# Patient Record
Sex: MALE | Race: BLACK OR AFRICAN AMERICAN | NOT HISPANIC OR LATINO | Employment: OTHER | URBAN - METROPOLITAN AREA
[De-identification: names, ages, dates, MRNs, and addresses within clinical notes are randomized per-mention and may not be internally consistent; named-entity substitution may affect disease eponyms.]

---

## 2018-09-28 ENCOUNTER — TELEPHONE (OUTPATIENT)
Dept: NEUROSURGERY | Facility: CLINIC | Age: 58
End: 2018-09-28

## 2018-10-01 ENCOUNTER — INITIAL CONSULT (OUTPATIENT)
Dept: NEUROSURGERY | Facility: CLINIC | Age: 58
End: 2018-10-01
Payer: MEDICARE

## 2018-10-01 ENCOUNTER — HOSPITAL ENCOUNTER (OUTPATIENT)
Dept: RADIOLOGY | Facility: HOSPITAL | Age: 58
Discharge: HOME | End: 2018-10-01
Attending: NEUROLOGICAL SURGERY
Payer: MEDICARE

## 2018-10-01 VITALS
SYSTOLIC BLOOD PRESSURE: 125 MMHG | WEIGHT: 225.31 LBS | HEART RATE: 94 BPM | DIASTOLIC BLOOD PRESSURE: 85 MMHG | BODY MASS INDEX: 28.16 KG/M2

## 2018-10-01 DIAGNOSIS — S32.009K LUMBAR PSEUDOARTHROSIS: ICD-10-CM

## 2018-10-01 DIAGNOSIS — M47.26 OTHER SPONDYLOSIS WITH RADICULOPATHY, LUMBAR REGION: ICD-10-CM

## 2018-10-01 DIAGNOSIS — Z98.1 S/P LUMBAR FUSION: ICD-10-CM

## 2018-10-01 DIAGNOSIS — M47.12 CERVICAL SPONDYLOSIS WITH MYELOPATHY: Primary | ICD-10-CM

## 2018-10-01 PROCEDURE — 99213 OFFICE O/P EST LOW 20 MIN: CPT | Mod: PBBFAC,PN | Performed by: NEUROLOGICAL SURGERY

## 2018-10-01 PROCEDURE — 99999 PR PBB SHADOW E&M-EST. PATIENT-LVL III: CPT | Mod: PBBFAC,,, | Performed by: NEUROLOGICAL SURGERY

## 2018-10-01 PROCEDURE — 99204 OFFICE O/P NEW MOD 45 MIN: CPT | Mod: S$PBB,,, | Performed by: NEUROLOGICAL SURGERY

## 2018-10-01 PROCEDURE — 72082 X-RAY EXAM ENTIRE SPI 2/3 VW: CPT | Mod: TC,FY

## 2018-10-01 PROCEDURE — 72082 X-RAY EXAM ENTIRE SPI 2/3 VW: CPT | Mod: 26,,, | Performed by: RADIOLOGY

## 2018-10-01 RX ORDER — OLMESARTAN MEDOXOMIL AND HYDROCHLOROTHIAZIDE 20/12.5 20; 12.5 MG/1; MG/1
1 TABLET ORAL DAILY
COMMUNITY
End: 2021-02-01 | Stop reason: ALTCHOICE

## 2018-10-01 NOTE — PROGRESS NOTES
NEUROSURGICAL OUTPATIENT CONSULTATION NOTE    DATE OF SERVICE:  10/01/2018    ATTENDING PHYSICIAN:  Ezequiel Pedroza MD    CONSULT REQUESTED BY:  Self referred    REASON FOR CONSULT:  Chronic low back pain, hands weakness    SUBJECTIVE:    HISTORY OF PRESENT ILLNESS:  This is a very pleasant 58 y.o. male who had right hip fusion in the 70's,  a MIS laminectomy in [place] in 2005 and a L3 to S1 posterolateral fusion with instrumentation at [facility] in 2015. Does not smoke tobacco but smoke THC for pain control. He has narcotic dependence since his back fusion surgery. He reports worsening low back pain since his lumbar fusion. Pain is felt from L3 to S1. No leg pain but reports bilateral S1 distribution paresthesia and numbness when he stands up. Also complains of bilateral C8 distribution numbness at night, hands weakness, difficulty opening jars. Not dropping things. Gait imbalance at baseline due to the hip fusion. Is actively doing PT and wears a LSO back brace for comfort.               PAST MEDICAL HISTORY:  Active Ambulatory Problems     Diagnosis Date Noted    Bursitis of hip 05/30/2014    DDD (degenerative disc disease), lumbosacral 05/30/2014    Lumbar spondylosis 05/30/2014    Lumbar radiculopathy 05/30/2014    History of back surgery 05/30/2014    Lumbar spinal stenosis 05/30/2014    Hx of surgical fusion joint 05/30/2014    Thoracic or lumbosacral neuritis or radiculitis, unspecified 06/20/2014    Positive cardiac stress test     Chest pain 08/23/2016    Dyspnea 08/23/2016     Resolved Ambulatory Problems     Diagnosis Date Noted    No Resolved Ambulatory Problems     Past Medical History:   Diagnosis Date    Hepatitis C     Positive cardiac stress test     Positive D dimer        PAST SURGICAL HISTORY:  Past Surgical History:   Procedure Laterality Date    HIP SURGERY Right     hip fusion    INJECTION-STEROID-EPIDURAL-LUMBAR N/A 6/20/2014    Per[facility]ely [facility] at [facility] OR  "   LAMINECTOMY      Lumbar    right wrist surgery         SOCIAL HISTORY:   Social History     Socioeconomic History    Marital status:      Spouse name: Not on file    Number of children: Not on file    Years of education: Not on file    Highest education level: Not on file   Social Needs    Financial resource strain: Not on file    Food insecurity - worry: Not on file    Food insecurity - inability: Not on file    Transportation needs - medical: Not on file    Transportation needs - non-medical: Not on file   Occupational History    Not on file   Tobacco Use    Smoking status: Never Smoker   Substance and Sexual Activity    Alcohol use: No    Drug use: Not on file    Sexual activity: Not on file   Other Topics Concern    Not on file   Social History Narrative    Not on file       FAMILY HISTORY:  Family History   Problem Relation Age of Onset    Diabetes Sister     Hypertension Sister     Diabetes Brother     Hypertension Brother        CURRENTS MEDICATIONS:  Current Outpatient Medications on File Prior to Visit   Medication Sig Dispense Refill    olmesartan-hydrochlorothiazide (BENICAR HCT) 20-12.5 mg per tablet Take 1 tablet by mouth once daily.      oxycodone-acetaminophen (PERCOCET)  mg per tablet Take 1 tablet by mouth every 4 (four) hours as needed for Pain.       No current facility-administered medications on file prior to visit.        ALLERGIES:  Review of patient's allergies indicates:   Allergen Reactions    Gabapentin Itching     C/O med causing bump like rash on lower trunk    Penicillins Rash     Pt states a "a couple of years ago" he took penicillin and broke out in "red spots" with no itching or difficulty breathing       REVIEW OF SYSTEMS:  Review of Systems   Constitutional: Negative for diaphoresis, fever and weight loss.   Respiratory: Negative for shortness of breath.    Cardiovascular: Negative for chest pain.   Gastrointestinal: Negative for blood in " stool.   Genitourinary: Negative for hematuria.   Endo/Heme/Allergies: Does not bruise/bleed easily.   All other systems reviewed and are negative.      OBJECTIVE:    PHYSICAL EXAMINATION:   Vitals:    10/01/18 1308   BP: 125/85   Pulse: 94       Physical Exam:  Vitals reviewed.    Constitutional: He appears well-developed and well-nourished.     Eyes: Pupils are equal, round, and reactive to light. Conjunctivae and EOM are normal.     Cardiovascular: Normal distal pulses and no edema.     Abdominal: Soft.     Skin: Skin displays no rash on trunk and no rash on extremities. Skin displays no lesions on trunk and no lesions on extremities.     Psych/Behavior: He is alert. He is oriented to person, place, and time. He has a normal mood and affect.     Musculoskeletal:        Neck: Range of motion is limited.     Neurological:        DTRs: Tricep reflexes are 3+ on the right side and 2+ on the left side. Bicep reflexes are 3+ on the right side and 2+ on the left side. Brachioradialis reflexes are 3+ on the right side and 2+ on the left side. Patellar reflexes are 1+ on the left side. Achilles reflexes are 0 on the right side and 0 on the left side.       Back Exam     Tenderness   The patient is experiencing tenderness in the lumbar.    Range of Motion   Extension: abnormal   Flexion: abnormal   Lateral bend right: abnormal   Lateral bend left: abnormal   Rotation right: abnormal   Rotation left: abnormal     Muscle Strength   Right Quadriceps:  5/5   Left Quadriceps:  5/5   Right Hamstrings:  5/5   Left Hamstrings:  5/5     Tests   Straight leg raise left: negative    Other   Toe walk: normal  Heel walk: normal            SI joint:   Palpation at the right and left SI joints not painful  RYLAN test is negative bilaterally  Gaenslen test is negative bilaterally  Thigh thrust test is negative bilaterally    Neurologic Exam     Mental Status   Oriented to person, place, and time.   Speech: speech is normal   Level of  consciousness: alert    Cranial Nerves   Cranial nerves II through XII intact.     CN III, IV, VI   Pupils are equal, round, and reactive to light.  Extraocular motions are normal.     Motor Exam   Muscle bulk: normal  Overall muscle tone: normal    Strength   Right deltoid: 5/5  Left deltoid: 5/5  Right biceps: 5/5  Left biceps: 5/5  Right triceps: 5/5  Left triceps: 5/5  Right wrist flexion: 5/5  Left wrist flexion: 5/5  Right wrist extension: 5/5  Left wrist extension: 5/5  Right interossei: 4/5  Left interossei: 4/5  Left iliopsoas: 5/5  Right quadriceps: 5/5  Left quadriceps: 5/5  Right hamstrin/5  Left hamstrin/5  Right anterior tibial: 5/5  Left anterior tibial: 5/5  Right posterior tibial: 5/5  Left posterior tibial: 5/5  Right peroneal: 5/5  Left peroneal: 5/5  Right gastroc: 5/5  Left gastroc: 5/5    Sensory Exam   Light touch normal.   Pinprick normal.     Gait, Coordination, and Reflexes     Gait  Gait: non-neurologic    Coordination   Finger to nose coordination: normal  Tandem walking coordination: normal    Reflexes   Right brachioradialis: 3+  Left brachioradialis: 2+  Right biceps: 3+  Left biceps: 2+  Right triceps: 3+  Left triceps: 2+  Left patellar: 1+  Right achilles: 0  Left achilles: 0  Right plantar: normal  Left plantar: normal  Right Schulz: absent  Left Schulz: absent  Right ankle clonus: absent  Left ankle clonus: absent        DIAGNOSTIC DATA:  I personally interpreted the following imaging:   Lumbar XR 2018: L3 to S1 fusion posterolateral, no interbody fusion, no loosening of hardware    ASSESMENT:  This is a 58 y.o. male with     Problem List Items Addressed This Visit     None      Visit Diagnoses     Cervical spondylosis with myelopathy    -  Primary    Relevant Orders    MRI Cervical Spine Without Contrast    S/P lumbar fusion        Relevant Orders    X-Ray Scoliosis Complete Including Supine And Erect    CT Lumbar Spine Without Contrast    MRI Lumbar Spine Without  Contrast    Lumbar pseudoarthrosis        Relevant Orders    X-Ray Scoliosis Complete Including Supine And Erect    CT Lumbar Spine Without Contrast    MRI Lumbar Spine Without Contrast    Other spondylosis with radiculopathy, lumbar region        Relevant Orders    X-Ray Scoliosis Complete Including Supine And Erect    MRI Lumbar Spine Without Contrast          PLAN:  FU after the MRI          Ezequiel Pedroza MD  Cell:[phone]

## 2018-10-10 ENCOUNTER — HOSPITAL ENCOUNTER (OUTPATIENT)
Dept: RADIOLOGY | Facility: HOSPITAL | Age: 58
Discharge: HOME | End: 2018-10-10
Attending: NEUROLOGICAL SURGERY
Payer: MEDICARE

## 2018-10-10 DIAGNOSIS — S32.009K LUMBAR PSEUDOARTHROSIS: ICD-10-CM

## 2018-10-10 DIAGNOSIS — Z98.1 S/P LUMBAR FUSION: ICD-10-CM

## 2018-10-10 PROCEDURE — 72131 CT LUMBAR SPINE W/O DYE: CPT | Mod: 26,,, | Performed by: RADIOLOGY

## 2018-10-10 PROCEDURE — 72131 CT LUMBAR SPINE W/O DYE: CPT | Mod: TC

## 2018-10-16 ENCOUNTER — TELEPHONE (OUTPATIENT)
Dept: NEUROSURGERY | Facility: CLINIC | Age: 58
End: 2018-10-16

## 2018-10-16 NOTE — TELEPHONE ENCOUNTER
Spoke to the patient he states the MRI he had scheduled today he was unable to do because it was not open. Instructed he can go to an open MRI on [address]. He is coming in tomorrow to  orders.

## 2018-10-16 NOTE — TELEPHONE ENCOUNTER
----- Message from Dolores Rodriguez sent at 10/16/2018 10:16 AM CDT -----  Contact: [phone]/self  Patient is requesting a callback about his MRI. Please advise.

## 2018-10-17 ENCOUNTER — TELEPHONE (OUTPATIENT)
Dept: NEUROSURGERY | Facility: CLINIC | Age: 58
End: 2018-10-17

## 2018-10-17 NOTE — TELEPHONE ENCOUNTER
Spoke to the patient, instructed that the only open MRI is on [address] and if it does not take his insurance I will send a message to Dr. Pedroza to see what he would like him to do.

## 2018-10-17 NOTE — TELEPHONE ENCOUNTER
----- Message from Quincy Vasquez sent at 10/17/2018 10:38 AM CDT -----  Contact: [phone]/self  Patient requesting to speak with you concerning [facility] not accepting his insurance.  He would like to speak with you concerning finding another place where he can have the [facility].  Please call and advise

## 2018-10-18 DIAGNOSIS — M48.061 SPINAL STENOSIS OF LUMBAR REGION WITH RADICULOPATHY: ICD-10-CM

## 2018-10-18 DIAGNOSIS — M54.16 SPINAL STENOSIS OF LUMBAR REGION WITH RADICULOPATHY: ICD-10-CM

## 2018-10-18 DIAGNOSIS — G95.9 CERVICAL MYELOPATHY: Primary | ICD-10-CM

## 2018-10-18 DIAGNOSIS — Z98.1 S/P LUMBAR FUSION: ICD-10-CM

## 2018-10-25 ENCOUNTER — TELEPHONE (OUTPATIENT)
Dept: NEUROSURGERY | Facility: CLINIC | Age: 58
End: 2018-10-25

## 2018-10-25 DIAGNOSIS — M47.26 OTHER SPONDYLOSIS WITH RADICULOPATHY, LUMBAR REGION: ICD-10-CM

## 2018-10-25 DIAGNOSIS — M47.12 CERVICAL SPONDYLOSIS WITH MYELOPATHY: Primary | ICD-10-CM

## 2018-11-05 ENCOUNTER — HOSPITAL ENCOUNTER (OUTPATIENT)
Dept: RADIOLOGY | Facility: HOSPITAL | Age: 58
Discharge: HOME | End: 2018-11-05
Attending: NEUROLOGICAL SURGERY | Admitting: NEUROLOGICAL SURGERY
Payer: MEDICARE

## 2018-11-05 ENCOUNTER — TELEPHONE (OUTPATIENT)
Dept: NEUROSURGERY | Facility: CLINIC | Age: 58
End: 2018-11-05

## 2018-11-05 ENCOUNTER — HOSPITAL ENCOUNTER (OUTPATIENT)
Facility: HOSPITAL | Age: 58
Discharge: HOME | End: 2018-11-05
Attending: NEUROLOGICAL SURGERY | Admitting: NEUROLOGICAL SURGERY
Payer: MEDICARE

## 2018-11-05 VITALS
HEIGHT: 74 IN | DIASTOLIC BLOOD PRESSURE: 74 MMHG | RESPIRATION RATE: 16 BRPM | OXYGEN SATURATION: 100 % | SYSTOLIC BLOOD PRESSURE: 109 MMHG | HEART RATE: 69 BPM | WEIGHT: 225 LBS | TEMPERATURE: 98 F | BODY MASS INDEX: 28.88 KG/M2

## 2018-11-05 DIAGNOSIS — M47.26 OTHER SPONDYLOSIS WITH RADICULOPATHY, LUMBAR REGION: ICD-10-CM

## 2018-11-05 DIAGNOSIS — G95.9 CERVICAL MYELOPATHY: ICD-10-CM

## 2018-11-05 DIAGNOSIS — M47.12 CERVICAL SPONDYLOSIS WITH MYELOPATHY: ICD-10-CM

## 2018-11-05 LAB — POCT GLUCOSE: 132 MG/DL (ref 70–110)

## 2018-11-05 PROCEDURE — 72131 CT LUMBAR SPINE W/O DYE: CPT | Mod: 26,,, | Performed by: RADIOLOGY

## 2018-11-05 PROCEDURE — 25000003 PHARM REV CODE 250: Performed by: NEUROLOGICAL SURGERY

## 2018-11-05 PROCEDURE — 82962 GLUCOSE BLOOD TEST: CPT

## 2018-11-05 PROCEDURE — 25500020 PHARM REV CODE 255: Performed by: NEUROLOGICAL SURGERY

## 2018-11-05 PROCEDURE — 72131 CT LUMBAR SPINE W/O DYE: CPT | Mod: TC

## 2018-11-05 PROCEDURE — 25000003 PHARM REV CODE 250: Performed by: RADIOLOGY

## 2018-11-05 PROCEDURE — 72125 CT NECK SPINE W/O DYE: CPT | Mod: 26,,, | Performed by: RADIOLOGY

## 2018-11-05 PROCEDURE — 72125 CT NECK SPINE W/O DYE: CPT | Mod: TC

## 2018-11-05 RX ORDER — OXYCODONE AND ACETAMINOPHEN 5; 325 MG/1; MG/1
2 TABLET ORAL ONCE AS NEEDED
Status: COMPLETED | OUTPATIENT
Start: 2018-11-05 | End: 2018-11-05

## 2018-11-05 RX ORDER — OXYCODONE AND ACETAMINOPHEN 5; 325 MG/1; MG/1
1 TABLET ORAL ONCE AS NEEDED
Status: DISCONTINUED | OUTPATIENT
Start: 2018-11-05 | End: 2018-11-05

## 2018-11-05 RX ORDER — NAPROXEN SODIUM 220 MG/1
81 TABLET, FILM COATED ORAL DAILY
Status: ON HOLD | COMMUNITY
End: 2019-09-20 | Stop reason: HOSPADM

## 2018-11-05 RX ORDER — SODIUM CHLORIDE 9 MG/ML
INJECTION, SOLUTION INTRAVENOUS CONTINUOUS
Status: DISCONTINUED | OUTPATIENT
Start: 2018-11-05 | End: 2018-11-05 | Stop reason: HOSPADM

## 2018-11-05 RX ORDER — LIDOCAINE HYDROCHLORIDE 10 MG/ML
1 INJECTION, SOLUTION EPIDURAL; INFILTRATION; INTRACAUDAL; PERINEURAL ONCE
Status: COMPLETED | OUTPATIENT
Start: 2018-11-05 | End: 2018-11-05

## 2018-11-05 RX ADMIN — LIDOCAINE HYDROCHLORIDE 2 MG: 10 INJECTION, SOLUTION EPIDURAL; INFILTRATION; INTRACAUDAL; PERINEURAL at 08:11

## 2018-11-05 RX ADMIN — OXYCODONE HYDROCHLORIDE AND ACETAMINOPHEN 2 TABLET: 5; 325 TABLET ORAL at 01:11

## 2018-11-05 RX ADMIN — IOHEXOL 10 ML: 300 INJECTION, SOLUTION INTRAVENOUS at 10:11

## 2018-11-05 RX ADMIN — SODIUM CHLORIDE: 0.9 INJECTION, SOLUTION INTRAVENOUS at 08:11

## 2018-11-05 NOTE — PROCEDURES
Radiology Post-Procedure Note    Pre Op Diagnosis: Chronic low back pain, hands weakness        Post Op Diagnosis: Same    Procedure: Lumbar and cervical myelogram    Procedure performed by: Rene Santiago MD supervised by Cristhian Seals MD    Written Informed Consent Obtained: Yes    Estimated Blood Loss: Minimal    Findings:  1% lidocaine was injected into the skin and a 22-gauge spinal needle was introduced into the L4-L5 interlaminar space under fluoroscopic guidance.  Position of the tip was confirmed to be in the thecal sac and clear csf identified spontaneously following removal of stylet. Subsequently,  10 mL Omnipaque 300 was injected under fluoroscopic surveillance.  Postprocedural images demonstrate satisfactory injection of contrast material and dynamic imaging was performed.  The patient tolerated the procedure well and was transferred to CT for further imaging. Full report to follow. Pt. Instructed on post procedure care including but not limited to signs of infection, bleeding, headaches, and follow up with ordering physician.        Rene Santiago MD  Resident  Department of Radiology  Pager: [phone]

## 2018-11-05 NOTE — H&P
"Radiology History & Physical       SUBJECTIVE:     Chief Complaint: Chronic low back pain, hands weakness    History of Present Illness:  Ezekiel Noyola is a 58 y.o. male who presents for cervical and lumbar myelogram.  Past Medical History:   Diagnosis Date    Hepatitis C     Positive cardiac stress test     Positive D dimer      Past Surgical History:   Procedure Laterality Date    HIP SURGERY Right     hip fusion    INJECTION-STEROID-EPIDURAL-LUMBAR N/A 6/20/2014    Performed by Tyson Neely Jr., MD at [facility] OR    LAMINECTOMY      Lumbar    right wrist surgery         Home Meds:   Prior to Admission medications    Medication Sig Start Date End Date Taking? Authorizing Provider   aspirin 81 MG Chew Take 81 mg by mouth once daily.   Yes Historical Provider, MD   olmesartan-hydrochlorothiazide (BENICAR HCT) 20-12.5 mg per tablet Take 1 tablet by mouth once daily.   Yes Historical Provider, MD   oxycodone-acetaminophen (PERCOCET)  mg per tablet Take 1 tablet by mouth every 4 (four) hours as needed for Pain.   Yes Historical Provider, MD     Anticoagulants/Antiplatelets: aspirin    Allergies:   Review of patient's allergies indicates:   Allergen Reactions    Gabapentin Itching     C/O med causing bump like rash on lower trunk    Penicillins Rash     Pt states a "a couple of years ago" he took penicillin and broke out in "red spots" with no itching or difficulty breathing     Sedation History:  no adverse reactions    Review of Systems:   Hematological: no known coagulopathies  Respiratory: no shortness of breath  Cardiovascular: no chest pain  Gastrointestinal: no abdominal pain  Genito-Urinary: no dysuria  Musculoskeletal: negative  Neurological: no TIA or stroke symptoms         OBJECTIVE:     Vital Signs (Most Recent)  Temp: 98 °F (36.7 °C) (11/05/18 0839)  Pulse: 70 (11/05/18 0839)  Resp: 18 (11/05/18 0839)  BP: 120/75 (11/05/18 0839)  SpO2: 96 % (11/05/18 0839)    Physical Exam:  ASA: " 2  Mallampati: II    General: no acute distress  Mental Status: alert and oriented to person, place and time  HEENT: normocephalic, atraumatic  Chest: unlabored breathing  Abdomen: nondistended  Extremity: moves all extremities    Laboratory  Lab Results   Component Value Date    INR 1.0 11/11/2009       Lab Results   Component Value Date    WBC 8.8 08/30/2016    HGB 16.1 08/30/2016    HCT 50.3 (H) 08/30/2016    MCV 84.7 08/30/2016     08/30/2016      Lab Results   Component Value Date     (H) 08/30/2016     08/30/2016    K 4.5 08/30/2016     08/30/2016    CO2 21 08/30/2016    BUN 13 08/30/2016    CREATININE 1.06 08/30/2016    CALCIUM 9.4 08/30/2016    ALT 14 08/30/2016    AST 15 08/30/2016    ALBUMIN 4.5 08/30/2016    BILITOT 0.6 08/30/2016       ASSESSMENT/PLAN:     Sedation Plan: local lidocaine  Patient will undergo fluoroscopic guided lumbar puncture.    Rene Santiago MD  Resident  Department of Radiology  Pager: [phone]

## 2018-11-05 NOTE — TELEPHONE ENCOUNTER
----- Message from Ezequiel Pedroza MD sent at 11/5/2018  3:18 PM CST -----  Please call back to schedule an appointment to discuss imaging and treatment options.

## 2018-11-05 NOTE — TELEPHONE ENCOUNTER
Patient has an appointment on 11/19/18 spoke to the patient regarding keeping follow up appointment to discuss myelogram results and treatment options.

## 2018-11-05 NOTE — PLAN OF CARE
"Patient tolerating oral liquids without difficulty. No apparent s&s of distress noted at this time, no complaints voiced at this time. Discharge instructions reviewed with patient/family/friend with good verbal feedback received. Patient ready for discharge.   No complaint of eye pain. C/O "normal" back pain states better after pain med. Dr. Santiago aware and approved pt's discharge now.  "

## 2018-11-19 ENCOUNTER — OFFICE VISIT (OUTPATIENT)
Dept: NEUROSURGERY | Facility: CLINIC | Age: 58
End: 2018-11-19
Payer: MEDICARE

## 2018-11-19 VITALS — HEART RATE: 86 BPM | SYSTOLIC BLOOD PRESSURE: 115 MMHG | DIASTOLIC BLOOD PRESSURE: 77 MMHG

## 2018-11-19 DIAGNOSIS — S32.009K LUMBAR PSEUDOARTHROSIS: ICD-10-CM

## 2018-11-19 DIAGNOSIS — Z98.1 S/P LUMBAR FUSION: Primary | ICD-10-CM

## 2018-11-19 PROCEDURE — 99999 PR PBB SHADOW E&M-EST. PATIENT-LVL II: CPT | Mod: PBBFAC,,, | Performed by: NEUROLOGICAL SURGERY

## 2018-11-19 PROCEDURE — 99213 OFFICE O/P EST LOW 20 MIN: CPT | Mod: S$PBB,,, | Performed by: NEUROLOGICAL SURGERY

## 2018-11-19 PROCEDURE — 99212 OFFICE O/P EST SF 10 MIN: CPT | Mod: PBBFAC,PN | Performed by: NEUROLOGICAL SURGERY

## 2018-11-19 NOTE — PROGRESS NOTES
NEUROSURGICAL PROGRESS NOTE    DATE OF SERVICE:  11/19/2018    ATTENDING PHYSICIAN:  Ezequiel Pedroza MD    SUBJECTIVE:  PRIOR HISTORY:    This is a very pleasant 58 y.o. male who had right hip fusion in the 70's,  a MIS laminectomy in [place] in 2005 and a L3 to S1 posterolateral fusion with instrumentation at [facility] in 2015. Does not smoke tobacco but smoke THC for pain control. He has narcotic dependence since his back fusion surgery. He reports worsening low back pain since his lumbar fusion. Pain is felt from L3 to S1. No leg pain but reports bilateral S1 distribution paresthesia and numbness when he stands up. Also complains of bilateral C8 distribution numbness at night, hands weakness, difficulty opening jars. Not dropping things. Gait imbalance at baseline due to the hip fusion. Is actively doing PT and wears a LSO back brace for comfort.     INTERIM HISTORY:    Here today to discuss the results of his imaging. Symptoms have remained the same. Most of the low back pain is felt at L5-S1 on the right side and irradiates in the posterior legs above the knee.     Low Back Pain Scale  R Low Back-Pain Score: 8  R Low Back-Pain Intensity: I can tolerate the pain I have without having to use pain killers  R Low Back-Pain Score: I can look after myself normally without causing extra pain  Low Back-Lifting: I can only lift very light weights   Low Back-Walking: Pain prevents me walking more than .25 mile   Low Back-Sitting: Pain prevents me from sitting more than than 10 minutes   Low Back-Standing: I cannot stand for longer than 30 mins without increasing pain   Low Back-Sleeping: Because of pain my normal nights sleep is reduced by less than one half   Low Back-Social Life: I have hardly any social life because of the pain   Low Back-Traveling: I have extra pain while traveling but it does not compel me to seek alternate forms of travel   Low Back-Changing Degree of Pain: My pain is gradually worsening         PAST  MEDICAL HISTORY:  Active Ambulatory Problems     Diagnosis Date Noted    Bursitis of hip 05/30/2014    DDD (degenerative disc disease), lumbosacral 05/30/2014    Lumbar spondylosis 05/30/2014    Lumbar radiculopathy 05/30/2014    History of back surgery 05/30/2014    Lumbar spinal stenosis 05/30/2014    Hx of surgical fusion joint 05/30/2014    Thoracic or lumbosacral neuritis or radiculitis, unspecified 06/20/2014    Positive cardiac stress test     Chest pain 08/23/2016    Dyspnea 08/23/2016     Resolved Ambulatory Problems     Diagnosis Date Noted    No Resolved Ambulatory Problems     Past Medical History:   Diagnosis Date    Hepatitis C     Positive cardiac stress test     Positive D dimer        PAST SURGICAL HISTORY:  Past Surgical History:   Procedure Laterality Date    HIP SURGERY Right     hip fusion    INJECTION-STEROID-EPIDURAL-LUMBAR N/A 6/20/2014    Performed [facility]    LAMINECTOMY      Lumbar    Myelogram N/A 11/5/2018    Performed by [facility] at [facility] OR [address] [place] [address]    MYELOGRAPHY N/A 11/5/2018    Procedure: Myelogram;  Surgeon: [facility];  Location: [facility] OR [address] [place] [address];  Service: Radiology;  Laterality: N/A;    right wrist surgery         SOCIAL HISTORY:   Social History     Socioeconomic History    Marital status:      Spouse name: Not on file    Number of children: Not on file    Years of education: Not on file    Highest education level: Not on file   Social Needs    Financial resource strain: Not on file    Food insecurity - worry: Not on file    Food insecurity - inability: Not on file    Transportation needs - medical: Not on file    Transportation needs - non-medical: Not on file   Occupational History    Not on file   Tobacco Use    Smoking status: Never Smoker   Substance and Sexual Activity    Alcohol use: No    Drug use: Not on file    Sexual activity: Not on file   Other Topics Concern     "Not on file   Social History Narrative    Not on file       FAMILY HISTORY:  Family History   Problem Relation Age of Onset    Diabetes Sister     Hypertension Sister     Diabetes Brother     Hypertension Brother        CURRENTS MEDICATIONS:  Current Outpatient Medications on File Prior to Visit   Medication Sig Dispense Refill    aspirin 81 MG Chew Take 81 mg by mouth once daily.      olmesartan-hydrochlorothiazide (BENICAR HCT) 20-12.5 mg per tablet Take 1 tablet by mouth once daily.      oxycodone-acetaminophen (PERCOCET)  mg per tablet Take 1 tablet by mouth every 4 (four) hours as needed for Pain.       No current facility-administered medications on file prior to visit.        ALLERGIES:  Review of patient's allergies indicates:   Allergen Reactions    Gabapentin Itching     C/O med causing bump like rash on lower trunk    Penicillins Rash     Pt states a "a couple of years ago" he took penicillin and broke out in "red spots" with no itching or difficulty breathing       REVIEW OF SYSTEMS:  Review of Systems   Constitutional: Negative for diaphoresis, fever and weight loss.   Respiratory: Negative for shortness of breath.    Cardiovascular: Negative for chest pain.   Gastrointestinal: Negative for blood in stool.   Genitourinary: Negative for hematuria.   Endo/Heme/Allergies: Does not bruise/bleed easily.   All other systems reviewed and are negative.        OBJECTIVE:    PHYSICAL EXAMINATION:   Vitals:    11/19/18 1326   BP: 115/77   Pulse: 86       Physical Exam:  Vitals reviewed.    Constitutional: He appears well-developed and well-nourished.     Eyes: Pupils are equal, round, and reactive to light. Conjunctivae and EOM are normal.     Cardiovascular: Normal distal pulses and no edema.     Abdominal: Soft.     Skin: Skin displays no rash on trunk and no rash on extremities. Skin displays no lesions on trunk and no lesions on extremities.     Psych/Behavior: He is alert. He is oriented to " person, place, and time. He has a normal mood and affect.     Musculoskeletal:        Neck: Range of motion is full.     Neurological:        DTRs: Tricep reflexes are 2+ on the right side and 2+ on the left side. Bicep reflexes are 2+ on the right side and 2+ on the left side. Brachioradialis reflexes are 2+ on the right side and 2+ on the left side. Patellar reflexes are 2+ on the right side and 2+ on the left side. Achilles reflexes are 2+ on the right side and 2+ on the left side.       Back Exam     Tenderness   The patient is experiencing tenderness in the lumbar and sacroiliac.    Range of Motion   Extension: normal   Flexion: abnormal   Lateral bend right: abnormal   Lateral bend left: abnormal   Rotation right: abnormal   Rotation left: abnormal     Muscle Strength   Right Quadriceps:  5/5   Left Quadriceps:  5/5   Right Hamstrings:  5/5   Left Hamstrings:  5/5                 Neurologic Exam     Mental Status   Oriented to person, place, and time.   Speech: speech is normal   Level of consciousness: alert    Cranial Nerves   Cranial nerves II through XII intact.     CN III, IV, VI   Pupils are equal, round, and reactive to light.  Extraocular motions are normal.     Motor Exam   Muscle bulk: normal  Overall muscle tone: normal    Strength   Right deltoid: 5/5  Left deltoid: 5/5  Right biceps: 5/5  Left biceps: 5/5  Right triceps: 5/5  Left triceps: 5/5  Right wrist flexion: 5/5  Left wrist flexion: 5/5  Right wrist extension: 5/5  Left wrist extension: 5/5  Right interossei: 5/5  Left interossei: 5/5  Left iliopsoas: 5/5  Right quadriceps: 5/5  Left quadriceps: 5/5  Right hamstrin/5  Left hamstrin/5  Right anterior tibial: 5/5  Left anterior tibial: 5/5  Right posterior tibial: 5/5  Left posterior tibial: 5/5  Right peroneal: 5/5  Left peroneal: 5/5  Right gastroc: 5/5  Left gastroc: 5/5    Sensory Exam   Light touch normal.   Pinprick normal.     Gait, Coordination, and Reflexes     Gait  Gait:  normal    Coordination   Finger to nose coordination: normal  Tandem walking coordination: normal    Reflexes   Right brachioradialis: 2+  Left brachioradialis: 2+  Right biceps: 2+  Left biceps: 2+  Right triceps: 2+  Left triceps: 2+  Right patellar: 2+  Left patellar: 2+  Right achilles: 2+  Left achilles: 2+  Right plantar: normal  Left plantar: normal  Right Schulz: absent  Left Schulz: absent  Right ankle clonus: absent  Left ankle clonus: absent        DIAGNOSTIC DATA:  I personally interpreted the following imaging:   Scoliosis films shows SVA 70, PI-LL mismatch of 12  CT myelogram shows L3 to L5 consolidation of fusion, no fusion at L5-S1, bilateral S1 screws were installed through the facet joint line and there is lucency around the bilateral S1 screws. Cervical spondylosis without significant stenosis.     ASSESMENT:  This is a 58 y.o. male with     Problem List Items Addressed This Visit     None      Visit Diagnoses     S/P lumbar fusion    -  Primary    Lumbar pseudoarthrosis            Loosening of spinal hardware      PLAN:  I explained the natural history of the disease and all treatment options. I recommended a removal of L3 to S1 instrumentation, revision of L4-5, L5-S1 posterolateral fusion, L5-S1 transforaminal interbody fusion, L3 to pelvis instrumentation.     We have discussed the risks of surgery including bleeding, infection, failure of surgery, CSF leak, nerve root injury, spinal cord injury, weakness, paralysis, peripheral neuropathy, malplaced hardware, migration of hardware, non-union, need for reoperation. Patient understands the risks and would like to proceed with surgery.              Ezequiel Pedroza MD  Cell:[phone]

## 2018-11-21 ENCOUNTER — TELEPHONE (OUTPATIENT)
Dept: NEUROSURGERY | Facility: CLINIC | Age: 58
End: 2018-11-21

## 2018-11-21 DIAGNOSIS — Z98.1 S/P LUMBAR FUSION: ICD-10-CM

## 2018-11-21 DIAGNOSIS — S32.009K LUMBAR PSEUDOARTHROSIS: Primary | ICD-10-CM

## 2018-12-06 ENCOUNTER — TELEPHONE (OUTPATIENT)
Dept: NEUROSURGERY | Facility: CLINIC | Age: 58
End: 2018-12-06

## 2018-12-06 NOTE — TELEPHONE ENCOUNTER
----- Message from Kasandra Garcia sent at 12/6/2018 11:21 AM CST -----  Contact: Self [phone]  Patient Returning Your Phone Call

## 2018-12-06 NOTE — TELEPHONE ENCOUNTER
Spoke to Levi with  Dr Avila office [phone] about clearance done on 11/28/18, He will fax the information to me.

## 2018-12-14 ENCOUNTER — HOSPITAL ENCOUNTER (OUTPATIENT)
Dept: PREADMISSION TESTING | Facility: HOSPITAL | Age: 58
Discharge: HOME | End: 2018-12-14
Attending: NEUROLOGICAL SURGERY
Payer: MEDICARE

## 2018-12-14 ENCOUNTER — ANESTHESIA EVENT (OUTPATIENT)
Dept: SURGERY | Facility: HOSPITAL | Age: 58
DRG: 454 | End: 2018-12-14
Payer: MEDICARE

## 2018-12-14 DIAGNOSIS — Z98.1 HX OF SURGICAL FUSION JOINT: Primary | ICD-10-CM

## 2018-12-14 RX ORDER — LIDOCAINE HYDROCHLORIDE 10 MG/ML
1 INJECTION, SOLUTION EPIDURAL; INFILTRATION; INTRACAUDAL; PERINEURAL ONCE
Status: CANCELLED | OUTPATIENT
Start: 2018-12-14 | End: 2018-12-14

## 2018-12-14 RX ORDER — SODIUM CHLORIDE, SODIUM LACTATE, POTASSIUM CHLORIDE, CALCIUM CHLORIDE 600; 310; 30; 20 MG/100ML; MG/100ML; MG/100ML; MG/100ML
INJECTION, SOLUTION INTRAVENOUS CONTINUOUS
Status: CANCELLED | OUTPATIENT
Start: 2018-12-14

## 2018-12-14 NOTE — ANESTHESIA PREPROCEDURE EVALUATION
12/14/2018  Ezekiel Noyola is a 58 y.o., male scheduled for revision of posterolateral L3-S1 fusion and L5-S1 interbody fusion on 12/20/18.    Awaiting scanned medical optimization note from PCP.    Past Medical History:   Diagnosis Date    Hepatitis C     Positive cardiac stress test     Positive D dimer      Past Surgical History:   Procedure Laterality Date    HIP SURGERY Right     hip fusion    INJECTION-STEROID-EPIDURAL-LUMBAR N/A 6/20/2014    Performed by [facility] at [facility] OR    LAMINECTOMY      Lumbar    Myelogram N/A 11/5/2018    Performed by [facility] at [facility] OR [address] [place] [address]    MYELOGRAPHY N/A 11/5/2018    Procedure: Myelogram;  Surgeon: [facility];  Location: [facility] OR [address] [place] [address];  Service: Radiology;  Laterality: N/A;    right wrist surgery         Anesthesia Evaluation    I have reviewed the Patient Summary Reports.    I have reviewed the Nursing Notes.   I have reviewed the Medications.     Review of Systems  Social:  Former Smoker, No Alcohol Use  Illicit Drug Use: Types of drugs include Marijuana,   Hematology/Oncology:  Hematology Normal        Cardiovascular:   Exercise tolerance: good Hypertension Denies Dysrhythmias.   Denies Angina.    Pulmonary:  Pulmonary Normal  Denies Shortness of breath.    Renal/:  Renal/ Normal     Hepatic/GI:   Hepatitis (Finished treatment 2 years ago ), C    Neurological:  Neurology Normal Denies TIA.  Denies CVA. Denies Seizures.    Endocrine:  Endocrine Normal        Physical Exam  General:  Well nourished               Coronary Angiogram 8/2016:  IMPRESSION:    No significant coronary artery disease.    Codominant coronary system supplying the PDA.        Anesthesia Plan  Type of Anesthesia, risks & benefits discussed:  Anesthesia Type:  general  Patient's Preference:   Intra-op Monitoring Plan:   Intra-op  Monitoring Plan Comments:   Post Op Pain Control Plan:   Post Op Pain Control Plan Comments:   Induction:   IV  Beta Blocker:  Patient is not currently on a Beta-Blocker (No further documentation required).       Informed Consent: Patient understands risks and agrees with Anesthesia plan.  Questions answered. Anesthesia consent signed with patient.  ASA Score: 2     Day of Surgery Review of History & Physical:        Anesthesia Plan Notes:           Ready For Surgery From Anesthesia Perspective.

## 2018-12-14 NOTE — PRE-PROCEDURE INSTRUCTIONS
Message sent to Britany at Dr. Pedroza office to see if clearance has been obtained. Called Dr. Avila's office (patients PCP) and message left for his nurse to fax clearance to [phone]

## 2018-12-14 NOTE — DISCHARGE INSTRUCTIONS
Your surgery is scheduled for 12/20/18.    Please report to Outpatient Surgery Intake Office on the 2nd FLOOR at 6:00a.m.          INSTRUCTIONS IMPORTANT!!!  ¨ Do not eat or drink after 12 midnight-including water. OK to brush teeth, no   gum, candy or mints!    ¨ Take only these medicines with a small swallow of water-morning of surgery: Benicar        ____  Proceed to [facility] on 12/14/18 for additional blood test.        ____  No powder, lotions or creams to surgical area.  ____  Please remove all jewelry, including piercings and leave at home.  ____  No money or valuables needed. Please leave at home.  ____  Please bring any documents given by your doctor.  ____  If going home the same day, arrange for a ride home. You will not be able to             drive if Anesthesia was used.  ____  Wear loose fitting clothing. Allow for dressings, bandages.  ____  Stop Aspirin, Ibuprofen, Motrin and Aleve at least 3-5 days before surgery, unless otherwise instructed by your doctor, or the nurse.   You MAY use Tylenol/acetaminophen until day of surgery.  ____  Wash the surgical area with Hibiclens the night before surgery, and again the             morning of surgery.  Be sure to rinse hibiclens off completely (if instructed by  nurse).  ____  If you take diabetic medication, do not take am of surgery unless instructed by Doctor.  ____  Call MD for temperature above 101 degrees or any other signs of infection such as Urinary (bladder) infection, Upper respiratory infection, skin boils, etc.  ____ Stop taking any Fish Oil supplement or any Vitamins that contain Vitamin E at least 5 days prior to surgery.  ____ Do Not wear your contact lenses the day of your procedure.  You may wear your glasses.      ____Do not shave for 3 days prior to surgery.  ____ Practice Good hand washing before, during, and after procedure.      I have read or had read and explained to me, and understand the above  information.  Additional comments or instructions:  For additional questions call [phone]      Pre-Op Bathing Instructions    Before surgery, you can play an important role in your own health.    Because skin is not sterile, we need to be sure that your skin is as free of germs as possible. By following the instructions below, you can reduce the number of germs on your skin before surgery.    IMPORTANT: You will need to shower with a special soap called Hibiclens*, available at any pharmacy.  If you are allergic to Chlorhexidine (the antiseptic in Hibiclens), use an antibacterial soap such as Dial Soap for your preoperative shower.  You will shower with Hibiclens both the night before your surgery and the morning of your surgery.  Do not use Hibiclens on the head, face or genitals to avoid injury to those areas.    STEP #1: THE NIGHT BEFORE YOUR SURGERY     1. Do not shave the area of your body where your surgery will be performed.  2. Shower and wash your hair and body as usual with your normal soap and shampoo.  3. Rinse your hair and body thoroughly after you shower to remove all soap residue.  4. With your hand, apply one packet of Hibiclens soap to the surgical site.   5. Wash the site gently for five (5) minutes. Do not scrub your skin too hard.   6. Do not wash with your regular soap after Hibiclens is used.  7. Rinse your body thoroughly.  8. Pat yourself dry with a clean, soft towel.  9. Do not use lotion, cream, or powder.  10. Wear clean clothes.    STEP #2: THE MORNING OF YOUR SURGERY     1. Repeat Step #1.    * Not to be used by people allergic to Chlorhexidine.

## 2018-12-19 ENCOUNTER — TELEPHONE (OUTPATIENT)
Dept: PAIN MEDICINE | Facility: CLINIC | Age: 58
End: 2018-12-19

## 2018-12-20 ENCOUNTER — HOSPITAL ENCOUNTER (INPATIENT)
Facility: HOSPITAL | Age: 58
LOS: 2 days | Discharge: HOME HEALTH | DRG: 454 | End: 2018-12-22
Attending: NEUROLOGICAL SURGERY | Admitting: NEUROLOGICAL SURGERY
Payer: MEDICARE

## 2018-12-20 ENCOUNTER — ANESTHESIA (OUTPATIENT)
Dept: SURGERY | Facility: HOSPITAL | Age: 58
DRG: 454 | End: 2018-12-20
Payer: MEDICARE

## 2018-12-20 DIAGNOSIS — M54.16 SPINAL STENOSIS OF LUMBAR REGION WITH RADICULOPATHY: ICD-10-CM

## 2018-12-20 DIAGNOSIS — M48.061 SPINAL STENOSIS OF LUMBAR REGION WITH RADICULOPATHY: ICD-10-CM

## 2018-12-20 LAB
CREAT SERPL-MCNC: 1 MG/DL
EST. GFR  (AFRICAN AMERICAN): >60 ML/MIN/1.73 M^2
EST. GFR  (NON AFRICAN AMERICAN): >60 ML/MIN/1.73 M^2

## 2018-12-20 PROCEDURE — 63600175 PHARM REV CODE 636 W HCPCS: Performed by: PHYSICIAN ASSISTANT

## 2018-12-20 PROCEDURE — 63600175 PHARM REV CODE 636 W HCPCS: Performed by: ANESTHESIOLOGY

## 2018-12-20 PROCEDURE — C1751 CATH, INF, PER/CENT/MIDLINE: HCPCS | Performed by: NURSE ANESTHETIST, CERTIFIED REGISTERED

## 2018-12-20 PROCEDURE — 22614 ARTHRD PST TQ 1NTRSPC EA ADD: CPT | Mod: AS,,, | Performed by: PHYSICIAN ASSISTANT

## 2018-12-20 PROCEDURE — 27000221 HC OXYGEN, UP TO 24 HOURS

## 2018-12-20 PROCEDURE — 36000711: Performed by: NEUROLOGICAL SURGERY

## 2018-12-20 PROCEDURE — 22214 INCIS 1 VERTEBRAL SEG LUMBAR: CPT | Mod: 51,AS,, | Performed by: PHYSICIAN ASSISTANT

## 2018-12-20 PROCEDURE — 94761 N-INVAS EAR/PLS OXIMETRY MLT: CPT

## 2018-12-20 PROCEDURE — 37000009 HC ANESTHESIA EA ADD 15 MINS: Performed by: NEUROLOGICAL SURGERY

## 2018-12-20 PROCEDURE — 0SG3071 FUSION OF LUMBOSACRAL JOINT WITH AUTOLOGOUS TISSUE SUBSTITUTE, POSTERIOR APPROACH, POSTERIOR COLUMN, OPEN APPROACH: ICD-10-PCS | Performed by: NEUROLOGICAL SURGERY

## 2018-12-20 PROCEDURE — 25000003 PHARM REV CODE 250: Performed by: NURSE ANESTHETIST, CERTIFIED REGISTERED

## 2018-12-20 PROCEDURE — 0SP304Z REMOVAL OF INTERNAL FIXATION DEVICE FROM LUMBOSACRAL JOINT, OPEN APPROACH: ICD-10-PCS | Performed by: NEUROLOGICAL SURGERY

## 2018-12-20 PROCEDURE — C1713 ANCHOR/SCREW BN/BN,TIS/BN: HCPCS | Performed by: NEUROLOGICAL SURGERY

## 2018-12-20 PROCEDURE — 36000710: Performed by: NEUROLOGICAL SURGERY

## 2018-12-20 PROCEDURE — 20930 SP BONE ALGRFT MORSEL ADD-ON: CPT | Mod: ,,, | Performed by: NEUROLOGICAL SURGERY

## 2018-12-20 PROCEDURE — 27201423 OPTIME MED/SURG SUP & DEVICES STERILE SUPPLY: Performed by: NEUROLOGICAL SURGERY

## 2018-12-20 PROCEDURE — 63600175 PHARM REV CODE 636 W HCPCS: Performed by: NURSE ANESTHETIST, CERTIFIED REGISTERED

## 2018-12-20 PROCEDURE — 22633 ARTHRD CMBN 1NTRSPC LUMBAR: CPT | Mod: ,,, | Performed by: NEUROLOGICAL SURGERY

## 2018-12-20 PROCEDURE — 22853 INSJ BIOMECHANICAL DEVICE: CPT | Mod: AS,,, | Performed by: PHYSICIAN ASSISTANT

## 2018-12-20 PROCEDURE — 63600175 PHARM REV CODE 636 W HCPCS

## 2018-12-20 PROCEDURE — P9045 ALBUMIN (HUMAN), 5%, 250 ML: HCPCS | Mod: JG | Performed by: NURSE ANESTHETIST, CERTIFIED REGISTERED

## 2018-12-20 PROCEDURE — 63600175 PHARM REV CODE 636 W HCPCS: Performed by: NEUROLOGICAL SURGERY

## 2018-12-20 PROCEDURE — 82565 ASSAY OF CREATININE: CPT

## 2018-12-20 PROCEDURE — 88300 SURGICAL PATH GROSS: CPT | Performed by: PATHOLOGY

## 2018-12-20 PROCEDURE — 22853 INSJ BIOMECHANICAL DEVICE: CPT | Mod: ,,, | Performed by: NEUROLOGICAL SURGERY

## 2018-12-20 PROCEDURE — 22842 INSERT SPINE FIXATION DEVICE: CPT | Mod: ,,, | Performed by: NEUROLOGICAL SURGERY

## 2018-12-20 PROCEDURE — 0SG30AJ FUSION OF LUMBOSACRAL JOINT WITH INTERBODY FUSION DEVICE, POSTERIOR APPROACH, ANTERIOR COLUMN, OPEN APPROACH: ICD-10-PCS | Performed by: NEUROLOGICAL SURGERY

## 2018-12-20 PROCEDURE — 22842 INSERT SPINE FIXATION DEVICE: CPT | Mod: AS,,, | Performed by: PHYSICIAN ASSISTANT

## 2018-12-20 PROCEDURE — 71000039 HC RECOVERY, EACH ADD'L HOUR: Performed by: NEUROLOGICAL SURGERY

## 2018-12-20 PROCEDURE — 0ST40ZZ RESECTION OF LUMBOSACRAL DISC, OPEN APPROACH: ICD-10-PCS | Performed by: NEUROLOGICAL SURGERY

## 2018-12-20 PROCEDURE — 4A11X4G MONITORING OF PERIPHERAL NERVOUS ELECTRICAL ACTIVITY, INTRAOPERATIVE, EXTERNAL APPROACH: ICD-10-PCS | Performed by: NEUROLOGICAL SURGERY

## 2018-12-20 PROCEDURE — 27200677 HC TRANSDUCER MONITOR KIT SINGLE: Performed by: NURSE ANESTHETIST, CERTIFIED REGISTERED

## 2018-12-20 PROCEDURE — 25000003 PHARM REV CODE 250: Performed by: NEUROLOGICAL SURGERY

## 2018-12-20 PROCEDURE — 0SG0071 FUSION OF LUMBAR VERTEBRAL JOINT WITH AUTOLOGOUS TISSUE SUBSTITUTE, POSTERIOR APPROACH, POSTERIOR COLUMN, OPEN APPROACH: ICD-10-PCS | Performed by: NEUROLOGICAL SURGERY

## 2018-12-20 PROCEDURE — 36415 COLL VENOUS BLD VENIPUNCTURE: CPT

## 2018-12-20 PROCEDURE — 25000003 PHARM REV CODE 250: Performed by: PHYSICIAN ASSISTANT

## 2018-12-20 PROCEDURE — 25000003 PHARM REV CODE 250: Performed by: NURSE PRACTITIONER

## 2018-12-20 PROCEDURE — 71000033 HC RECOVERY, INTIAL HOUR: Performed by: NEUROLOGICAL SURGERY

## 2018-12-20 PROCEDURE — 0SP004Z REMOVAL OF INTERNAL FIXATION DEVICE FROM LUMBAR VERTEBRAL JOINT, OPEN APPROACH: ICD-10-PCS | Performed by: NEUROLOGICAL SURGERY

## 2018-12-20 PROCEDURE — 22214 INCIS 1 VERTEBRAL SEG LUMBAR: CPT | Mod: ,,, | Performed by: NEUROLOGICAL SURGERY

## 2018-12-20 PROCEDURE — 25000003 PHARM REV CODE 250: Performed by: ANESTHESIOLOGY

## 2018-12-20 PROCEDURE — 88300 SURGICAL PATH GROSS: CPT | Mod: 26,,, | Performed by: PATHOLOGY

## 2018-12-20 PROCEDURE — 11000001 HC ACUTE MED/SURG PRIVATE ROOM

## 2018-12-20 PROCEDURE — 37000008 HC ANESTHESIA 1ST 15 MINUTES: Performed by: NEUROLOGICAL SURGERY

## 2018-12-20 PROCEDURE — 22633 ARTHRD CMBN 1NTRSPC LUMBAR: CPT | Mod: AS,,, | Performed by: PHYSICIAN ASSISTANT

## 2018-12-20 PROCEDURE — C1729 CATH, DRAINAGE: HCPCS | Performed by: NEUROLOGICAL SURGERY

## 2018-12-20 PROCEDURE — 20936 SP BONE AGRFT LOCAL ADD-ON: CPT | Mod: ,,, | Performed by: NEUROLOGICAL SURGERY

## 2018-12-20 PROCEDURE — 22614 ARTHRD PST TQ 1NTRSPC EA ADD: CPT | Mod: 51,,, | Performed by: NEUROLOGICAL SURGERY

## 2018-12-20 DEVICE — SCREW BONE SPINAL CREO 6.5X45: Type: IMPLANTABLE DEVICE | Site: SPINE LUMBAR | Status: FUNCTIONAL

## 2018-12-20 DEVICE — CAP SPINAL LOCK THRD CREO 5.5: Type: IMPLANTABLE DEVICE | Site: SPINE LUMBAR | Status: FUNCTIONAL

## 2018-12-20 DEVICE — SCREW BONE SPINAL 6.5X40MM: Type: IMPLANTABLE DEVICE | Site: SPINE LUMBAR | Status: FUNCTIONAL

## 2018-12-20 RX ORDER — OXYCODONE HYDROCHLORIDE 5 MG/1
5 TABLET ORAL
Status: DISCONTINUED | OUTPATIENT
Start: 2018-12-20 | End: 2018-12-20

## 2018-12-20 RX ORDER — ACETAMINOPHEN 325 MG/1
650 TABLET ORAL
Status: DISCONTINUED | OUTPATIENT
Start: 2018-12-20 | End: 2018-12-22 | Stop reason: HOSPADM

## 2018-12-20 RX ORDER — LIDOCAINE HYDROCHLORIDE AND EPINEPHRINE 10; 10 MG/ML; UG/ML
INJECTION, SOLUTION INFILTRATION; PERINEURAL
Status: DISCONTINUED | OUTPATIENT
Start: 2018-12-20 | End: 2018-12-20 | Stop reason: HOSPADM

## 2018-12-20 RX ORDER — OXYCODONE HYDROCHLORIDE 5 MG/1
10 TABLET ORAL EVERY 6 HOURS PRN
Status: DISCONTINUED | OUTPATIENT
Start: 2018-12-20 | End: 2018-12-22 | Stop reason: HOSPADM

## 2018-12-20 RX ORDER — BISACODYL 10 MG
10 SUPPOSITORY, RECTAL RECTAL DAILY PRN
Status: DISCONTINUED | OUTPATIENT
Start: 2018-12-20 | End: 2018-12-22 | Stop reason: HOSPADM

## 2018-12-20 RX ORDER — ALBUMIN HUMAN 50 G/1000ML
SOLUTION INTRAVENOUS CONTINUOUS PRN
Status: DISCONTINUED | OUTPATIENT
Start: 2018-12-20 | End: 2018-12-20

## 2018-12-20 RX ORDER — ONDANSETRON 8 MG/1
8 TABLET, ORALLY DISINTEGRATING ORAL EVERY 6 HOURS PRN
Status: DISCONTINUED | OUTPATIENT
Start: 2018-12-20 | End: 2018-12-22 | Stop reason: HOSPADM

## 2018-12-20 RX ORDER — SODIUM CHLORIDE, SODIUM LACTATE, POTASSIUM CHLORIDE, CALCIUM CHLORIDE 600; 310; 30; 20 MG/100ML; MG/100ML; MG/100ML; MG/100ML
INJECTION, SOLUTION INTRAVENOUS CONTINUOUS PRN
Status: DISCONTINUED | OUTPATIENT
Start: 2018-12-20 | End: 2018-12-20

## 2018-12-20 RX ORDER — SODIUM CHLORIDE 9 MG/ML
INJECTION, SOLUTION INTRAVENOUS CONTINUOUS PRN
Status: DISCONTINUED | OUTPATIENT
Start: 2018-12-20 | End: 2018-12-20

## 2018-12-20 RX ORDER — PHENYLEPHRINE HYDROCHLORIDE 10 MG/ML
INJECTION INTRAVENOUS
Status: DISCONTINUED | OUTPATIENT
Start: 2018-12-20 | End: 2018-12-20

## 2018-12-20 RX ORDER — ACETAMINOPHEN 500 MG
1000 TABLET ORAL
Status: COMPLETED | OUTPATIENT
Start: 2018-12-20 | End: 2018-12-20

## 2018-12-20 RX ORDER — AMOXICILLIN 250 MG
2 CAPSULE ORAL NIGHTLY PRN
Status: DISCONTINUED | OUTPATIENT
Start: 2018-12-20 | End: 2018-12-22 | Stop reason: HOSPADM

## 2018-12-20 RX ORDER — CYCLOBENZAPRINE HCL 10 MG
10 TABLET ORAL
Status: COMPLETED | OUTPATIENT
Start: 2018-12-20 | End: 2018-12-20

## 2018-12-20 RX ORDER — SUCCINYLCHOLINE CHLORIDE 20 MG/ML
INJECTION INTRAMUSCULAR; INTRAVENOUS
Status: DISCONTINUED | OUTPATIENT
Start: 2018-12-20 | End: 2018-12-20

## 2018-12-20 RX ORDER — HYDROMORPHONE HYDROCHLORIDE 2 MG/ML
4 INJECTION, SOLUTION INTRAMUSCULAR; INTRAVENOUS; SUBCUTANEOUS
Status: DISCONTINUED | OUTPATIENT
Start: 2018-12-20 | End: 2018-12-22 | Stop reason: HOSPADM

## 2018-12-20 RX ORDER — LIDOCAINE HCL/PF 100 MG/5ML
SYRINGE (ML) INTRAVENOUS
Status: DISCONTINUED | OUTPATIENT
Start: 2018-12-20 | End: 2018-12-20

## 2018-12-20 RX ORDER — INDOMETHACIN 50 MG/1
50 CAPSULE ORAL 3 TIMES DAILY
Status: ON HOLD | COMMUNITY
End: 2018-12-22 | Stop reason: HOSPADM

## 2018-12-20 RX ORDER — CYCLOBENZAPRINE HCL 5 MG
5 TABLET ORAL 3 TIMES DAILY PRN
Status: DISCONTINUED | OUTPATIENT
Start: 2018-12-20 | End: 2018-12-20 | Stop reason: DRUGHIGH

## 2018-12-20 RX ORDER — OXYCODONE HYDROCHLORIDE 5 MG/1
5 TABLET ORAL EVERY 4 HOURS PRN
Status: DISCONTINUED | OUTPATIENT
Start: 2018-12-20 | End: 2018-12-20 | Stop reason: SDUPTHER

## 2018-12-20 RX ORDER — PROPOFOL 10 MG/ML
VIAL (ML) INTRAVENOUS
Status: DISCONTINUED | OUTPATIENT
Start: 2018-12-20 | End: 2018-12-20

## 2018-12-20 RX ORDER — HYDROMORPHONE HYDROCHLORIDE 2 MG/ML
INJECTION, SOLUTION INTRAMUSCULAR; INTRAVENOUS; SUBCUTANEOUS
Status: COMPLETED
Start: 2018-12-20 | End: 2018-12-20

## 2018-12-20 RX ORDER — MAG HYDROX/ALUMINUM HYD/SIMETH 200-200-20
30 SUSPENSION, ORAL (FINAL DOSE FORM) ORAL EVERY 4 HOURS PRN
Status: DISCONTINUED | OUTPATIENT
Start: 2018-12-20 | End: 2018-12-22 | Stop reason: HOSPADM

## 2018-12-20 RX ORDER — CELECOXIB 100 MG/1
200 CAPSULE ORAL
Status: COMPLETED | OUTPATIENT
Start: 2018-12-20 | End: 2018-12-20

## 2018-12-20 RX ORDER — ONDANSETRON 2 MG/ML
INJECTION INTRAMUSCULAR; INTRAVENOUS
Status: DISCONTINUED | OUTPATIENT
Start: 2018-12-20 | End: 2018-12-20

## 2018-12-20 RX ORDER — LOSARTAN POTASSIUM 50 MG/1
50 TABLET ORAL DAILY
Status: DISCONTINUED | OUTPATIENT
Start: 2018-12-21 | End: 2018-12-22 | Stop reason: HOSPADM

## 2018-12-20 RX ORDER — HYDROMORPHONE HYDROCHLORIDE 2 MG/ML
0.5 INJECTION, SOLUTION INTRAMUSCULAR; INTRAVENOUS; SUBCUTANEOUS EVERY 5 MIN PRN
Status: DISCONTINUED | OUTPATIENT
Start: 2018-12-20 | End: 2018-12-20

## 2018-12-20 RX ORDER — ONDANSETRON 2 MG/ML
4 INJECTION INTRAMUSCULAR; INTRAVENOUS DAILY PRN
Status: DISCONTINUED | OUTPATIENT
Start: 2018-12-20 | End: 2018-12-20 | Stop reason: HOSPADM

## 2018-12-20 RX ORDER — NEOSTIGMINE METHYLSULFATE 1 MG/ML
INJECTION, SOLUTION INTRAVENOUS
Status: DISCONTINUED | OUTPATIENT
Start: 2018-12-20 | End: 2018-12-20

## 2018-12-20 RX ORDER — SODIUM CHLORIDE 9 MG/ML
INJECTION, SOLUTION INTRAVENOUS CONTINUOUS
Status: ACTIVE | OUTPATIENT
Start: 2018-12-20 | End: 2018-12-20

## 2018-12-20 RX ORDER — CELECOXIB 100 MG/1
200 CAPSULE ORAL 2 TIMES DAILY
Status: DISCONTINUED | OUTPATIENT
Start: 2018-12-20 | End: 2018-12-22 | Stop reason: HOSPADM

## 2018-12-20 RX ORDER — MIDAZOLAM HYDROCHLORIDE 1 MG/ML
INJECTION, SOLUTION INTRAMUSCULAR; INTRAVENOUS
Status: DISCONTINUED | OUTPATIENT
Start: 2018-12-20 | End: 2018-12-20

## 2018-12-20 RX ORDER — HYDROMORPHONE HYDROCHLORIDE 2 MG/ML
0.5 INJECTION, SOLUTION INTRAMUSCULAR; INTRAVENOUS; SUBCUTANEOUS EVERY 5 MIN PRN
Status: COMPLETED | OUTPATIENT
Start: 2018-12-20 | End: 2018-12-20

## 2018-12-20 RX ORDER — GLYCOPYRROLATE 0.2 MG/ML
INJECTION INTRAMUSCULAR; INTRAVENOUS
Status: DISCONTINUED | OUTPATIENT
Start: 2018-12-20 | End: 2018-12-20

## 2018-12-20 RX ORDER — OXYCODONE HCL 10 MG/1
10 TABLET, FILM COATED, EXTENDED RELEASE ORAL
Status: COMPLETED | OUTPATIENT
Start: 2018-12-20 | End: 2018-12-20

## 2018-12-20 RX ORDER — HYDROMORPHONE HYDROCHLORIDE 2 MG/ML
2 INJECTION, SOLUTION INTRAMUSCULAR; INTRAVENOUS; SUBCUTANEOUS
Status: DISCONTINUED | OUTPATIENT
Start: 2018-12-20 | End: 2018-12-20 | Stop reason: SDUPTHER

## 2018-12-20 RX ORDER — TRAMADOL HYDROCHLORIDE 50 MG/1
100 TABLET ORAL EVERY 6 HOURS SCHEDULED
Status: DISCONTINUED | OUTPATIENT
Start: 2018-12-20 | End: 2018-12-22 | Stop reason: HOSPADM

## 2018-12-20 RX ORDER — GENTAMICIN SULFATE 80 MG/100ML
80 INJECTION, SOLUTION INTRAVENOUS
Status: DISCONTINUED | OUTPATIENT
Start: 2018-12-20 | End: 2018-12-20

## 2018-12-20 RX ORDER — BACITRACIN ZINC 500 UNIT/G
OINTMENT (GRAM) TOPICAL
Status: DISCONTINUED | OUTPATIENT
Start: 2018-12-20 | End: 2018-12-20 | Stop reason: HOSPADM

## 2018-12-20 RX ORDER — HEPARIN SODIUM 5000 [USP'U]/ML
5000 INJECTION, SOLUTION INTRAVENOUS; SUBCUTANEOUS EVERY 8 HOURS
Status: DISCONTINUED | OUTPATIENT
Start: 2018-12-20 | End: 2018-12-22 | Stop reason: HOSPADM

## 2018-12-20 RX ORDER — HYDROMORPHONE HYDROCHLORIDE 2 MG/ML
INJECTION, SOLUTION INTRAMUSCULAR; INTRAVENOUS; SUBCUTANEOUS
Status: DISCONTINUED | OUTPATIENT
Start: 2018-12-20 | End: 2018-12-20

## 2018-12-20 RX ORDER — METHOCARBAMOL 750 MG/1
750 TABLET, FILM COATED ORAL 3 TIMES DAILY
Status: DISCONTINUED | OUTPATIENT
Start: 2018-12-20 | End: 2018-12-20 | Stop reason: SDUPTHER

## 2018-12-20 RX ORDER — BACITRACIN 50000 [IU]/1
INJECTION, POWDER, FOR SOLUTION INTRAMUSCULAR
Status: DISCONTINUED | OUTPATIENT
Start: 2018-12-20 | End: 2018-12-20 | Stop reason: HOSPADM

## 2018-12-20 RX ORDER — EPHEDRINE SULFATE 50 MG/ML
INJECTION, SOLUTION INTRAVENOUS
Status: DISCONTINUED | OUTPATIENT
Start: 2018-12-20 | End: 2018-12-20

## 2018-12-20 RX ORDER — CYCLOBENZAPRINE HCL 10 MG
10 TABLET ORAL 3 TIMES DAILY
Status: DISCONTINUED | OUTPATIENT
Start: 2018-12-20 | End: 2018-12-22 | Stop reason: HOSPADM

## 2018-12-20 RX ORDER — ROCURONIUM BROMIDE 10 MG/ML
INJECTION, SOLUTION INTRAVENOUS
Status: DISCONTINUED | OUTPATIENT
Start: 2018-12-20 | End: 2018-12-20

## 2018-12-20 RX ORDER — HYDROCHLOROTHIAZIDE 12.5 MG/1
12.5 TABLET ORAL DAILY
Status: DISCONTINUED | OUTPATIENT
Start: 2018-12-21 | End: 2018-12-22 | Stop reason: HOSPADM

## 2018-12-20 RX ORDER — DEXAMETHASONE SODIUM PHOSPHATE 4 MG/ML
INJECTION, SOLUTION INTRA-ARTICULAR; INTRALESIONAL; INTRAMUSCULAR; INTRAVENOUS; SOFT TISSUE
Status: DISCONTINUED | OUTPATIENT
Start: 2018-12-20 | End: 2018-12-20

## 2018-12-20 RX ORDER — ACETAMINOPHEN 325 MG/1
650 TABLET ORAL EVERY 6 HOURS PRN
Status: DISCONTINUED | OUTPATIENT
Start: 2018-12-20 | End: 2018-12-22 | Stop reason: HOSPADM

## 2018-12-20 RX ORDER — VANCOMYCIN HCL IN 5 % DEXTROSE 1G/250ML
1000 PLASTIC BAG, INJECTION (ML) INTRAVENOUS
Status: DISCONTINUED | OUTPATIENT
Start: 2018-12-20 | End: 2018-12-20 | Stop reason: DRUGHIGH

## 2018-12-20 RX ORDER — FENTANYL CITRATE 50 UG/ML
INJECTION, SOLUTION INTRAMUSCULAR; INTRAVENOUS
Status: DISCONTINUED | OUTPATIENT
Start: 2018-12-20 | End: 2018-12-20

## 2018-12-20 RX ORDER — LIDOCAINE HYDROCHLORIDE 10 MG/ML
1 INJECTION, SOLUTION EPIDURAL; INFILTRATION; INTRACAUDAL; PERINEURAL ONCE
Status: DISCONTINUED | OUTPATIENT
Start: 2018-12-20 | End: 2018-12-20 | Stop reason: HOSPADM

## 2018-12-20 RX ORDER — PROPOFOL 10 MG/ML
VIAL (ML) INTRAVENOUS CONTINUOUS PRN
Status: DISCONTINUED | OUTPATIENT
Start: 2018-12-20 | End: 2018-12-20

## 2018-12-20 RX ORDER — BACITRACIN ZINC 500 UNIT/G
OINTMENT (GRAM) TOPICAL DAILY
Status: DISCONTINUED | OUTPATIENT
Start: 2018-12-20 | End: 2018-12-20 | Stop reason: HOSPADM

## 2018-12-20 RX ORDER — SODIUM CHLORIDE, SODIUM LACTATE, POTASSIUM CHLORIDE, CALCIUM CHLORIDE 600; 310; 30; 20 MG/100ML; MG/100ML; MG/100ML; MG/100ML
INJECTION, SOLUTION INTRAVENOUS CONTINUOUS
Status: DISCONTINUED | OUTPATIENT
Start: 2018-12-20 | End: 2018-12-20

## 2018-12-20 RX ORDER — MUPIROCIN 20 MG/G
1 OINTMENT TOPICAL 2 TIMES DAILY
Status: DISCONTINUED | OUTPATIENT
Start: 2018-12-20 | End: 2018-12-22 | Stop reason: HOSPADM

## 2018-12-20 RX ADMIN — LIDOCAINE HYDROCHLORIDE 100 MG: 20 INJECTION, SOLUTION INTRAVENOUS at 10:12

## 2018-12-20 RX ADMIN — ROCURONIUM BROMIDE 20 MG: 10 INJECTION, SOLUTION INTRAVENOUS at 10:12

## 2018-12-20 RX ADMIN — EPHEDRINE SULFATE 10 MG: 50 INJECTION, SOLUTION INTRAMUSCULAR; INTRAVENOUS; SUBCUTANEOUS at 12:12

## 2018-12-20 RX ADMIN — ACETAMINOPHEN 1000 MG: 500 TABLET ORAL at 07:12

## 2018-12-20 RX ADMIN — EPHEDRINE SULFATE 5 MG: 50 INJECTION, SOLUTION INTRAMUSCULAR; INTRAVENOUS; SUBCUTANEOUS at 12:12

## 2018-12-20 RX ADMIN — MIDAZOLAM 2 MG: 1 INJECTION INTRAMUSCULAR; INTRAVENOUS at 10:12

## 2018-12-20 RX ADMIN — SODIUM CHLORIDE: 0.9 INJECTION, SOLUTION INTRAVENOUS at 05:12

## 2018-12-20 RX ADMIN — HYDROMORPHONE HYDROCHLORIDE 0.5 MG: 2 INJECTION INTRAMUSCULAR; INTRAVENOUS; SUBCUTANEOUS at 02:12

## 2018-12-20 RX ADMIN — ALBUMIN (HUMAN): 2.5 SOLUTION INTRAVENOUS at 12:12

## 2018-12-20 RX ADMIN — PROPOFOL 200 MG: 10 INJECTION, EMULSION INTRAVENOUS at 10:12

## 2018-12-20 RX ADMIN — SUCCINYLCHOLINE CHLORIDE 120 MG: 20 INJECTION, SOLUTION INTRAMUSCULAR; INTRAVENOUS at 10:12

## 2018-12-20 RX ADMIN — GLYCOPYRROLATE 0.4 MG: 0.2 INJECTION, SOLUTION INTRAMUSCULAR; INTRAVENOUS at 02:12

## 2018-12-20 RX ADMIN — CELECOXIB 200 MG: 100 CAPSULE ORAL at 07:12

## 2018-12-20 RX ADMIN — SODIUM CHLORIDE: 0.9 INJECTION, SOLUTION INTRAVENOUS at 12:12

## 2018-12-20 RX ADMIN — HYDROMORPHONE HYDROCHLORIDE 0.5 MG: 2 INJECTION INTRAMUSCULAR; INTRAVENOUS; SUBCUTANEOUS at 03:12

## 2018-12-20 RX ADMIN — NEOSTIGMINE METHYLSULFATE 4 MG: 1 INJECTION INTRAVENOUS at 02:12

## 2018-12-20 RX ADMIN — CYCLOBENZAPRINE HYDROCHLORIDE 10 MG: 10 TABLET, FILM COATED ORAL at 06:12

## 2018-12-20 RX ADMIN — HEPARIN SODIUM 5000 UNITS: 5000 INJECTION, SOLUTION INTRAVENOUS; SUBCUTANEOUS at 11:12

## 2018-12-20 RX ADMIN — FENTANYL CITRATE 50 MCG: 50 INJECTION, SOLUTION INTRAMUSCULAR; INTRAVENOUS at 01:12

## 2018-12-20 RX ADMIN — SODIUM CHLORIDE: 0.9 INJECTION, SOLUTION INTRAVENOUS at 10:12

## 2018-12-20 RX ADMIN — CELECOXIB 200 MG: 100 CAPSULE ORAL at 09:12

## 2018-12-20 RX ADMIN — ONDANSETRON 8 MG: 2 INJECTION, SOLUTION INTRAMUSCULAR; INTRAVENOUS at 02:12

## 2018-12-20 RX ADMIN — PHENYLEPHRINE HYDROCHLORIDE 100 MCG: 10 INJECTION INTRAVENOUS at 11:12

## 2018-12-20 RX ADMIN — MUPIROCIN 1 G: 20 OINTMENT TOPICAL at 09:12

## 2018-12-20 RX ADMIN — OXYCODONE HYDROCHLORIDE 10 MG: 10 TABLET, FILM COATED, EXTENDED RELEASE ORAL at 07:12

## 2018-12-20 RX ADMIN — FENTANYL CITRATE 150 MCG: 50 INJECTION, SOLUTION INTRAMUSCULAR; INTRAVENOUS at 10:12

## 2018-12-20 RX ADMIN — ROCURONIUM BROMIDE 5 MG: 10 INJECTION, SOLUTION INTRAVENOUS at 10:12

## 2018-12-20 RX ADMIN — HYDROMORPHONE HYDROCHLORIDE 1 MG: 2 INJECTION, SOLUTION INTRAMUSCULAR; INTRAVENOUS; SUBCUTANEOUS at 02:12

## 2018-12-20 RX ADMIN — PROPOFOL 100 MCG/KG/MIN: 10 INJECTION, EMULSION INTRAVENOUS at 10:12

## 2018-12-20 RX ADMIN — DEXAMETHASONE SODIUM PHOSPHATE 8 MG: 4 INJECTION, SOLUTION INTRAMUSCULAR; INTRAVENOUS at 11:12

## 2018-12-20 RX ADMIN — FENTANYL CITRATE 50 MCG: 50 INJECTION, SOLUTION INTRAMUSCULAR; INTRAVENOUS at 02:12

## 2018-12-20 RX ADMIN — VANCOMYCIN HYDROCHLORIDE 1500 MG: 10 INJECTION, POWDER, LYOPHILIZED, FOR SOLUTION INTRAVENOUS at 10:12

## 2018-12-20 RX ADMIN — VANCOMYCIN HYDROCHLORIDE 1500 MG: 10 INJECTION, POWDER, LYOPHILIZED, FOR SOLUTION INTRAVENOUS at 11:12

## 2018-12-20 RX ADMIN — SODIUM CHLORIDE, SODIUM LACTATE, POTASSIUM CHLORIDE, AND CALCIUM CHLORIDE: .6; .31; .03; .02 INJECTION, SOLUTION INTRAVENOUS at 07:12

## 2018-12-20 RX ADMIN — PHENYLEPHRINE HYDROCHLORIDE 100 MCG: 10 INJECTION INTRAVENOUS at 01:12

## 2018-12-20 RX ADMIN — OXYCODONE HYDROCHLORIDE 10 MG: 5 TABLET ORAL at 07:12

## 2018-12-20 RX ADMIN — PHENYLEPHRINE HYDROCHLORIDE 100 MCG: 10 INJECTION INTRAVENOUS at 12:12

## 2018-12-20 RX ADMIN — KETAMINE HYDROCHLORIDE 5 MCG/KG/MIN: 50 INJECTION, SOLUTION, CONCENTRATE INTRAMUSCULAR; INTRAVENOUS at 10:12

## 2018-12-20 RX ADMIN — CYCLOBENZAPRINE HYDROCHLORIDE 10 MG: 10 TABLET, FILM COATED ORAL at 09:12

## 2018-12-20 RX ADMIN — REMIFENTANIL HYDROCHLORIDE 0.05 MCG/KG/MIN: 1 INJECTION, POWDER, LYOPHILIZED, FOR SOLUTION INTRAVENOUS at 10:12

## 2018-12-20 RX ADMIN — ACETAMINOPHEN 650 MG: 325 TABLET, FILM COATED ORAL at 09:12

## 2018-12-20 RX ADMIN — TRAMADOL HYDROCHLORIDE 100 MG: 50 TABLET, COATED ORAL at 07:12

## 2018-12-20 RX ADMIN — OXYCODONE HYDROCHLORIDE 5 MG: 5 TABLET ORAL at 03:12

## 2018-12-20 RX ADMIN — ROCURONIUM BROMIDE 20 MG: 10 INJECTION, SOLUTION INTRAVENOUS at 11:12

## 2018-12-20 RX ADMIN — GENTAMICIN SULFATE 80 MG: 80 INJECTION, SOLUTION INTRAVENOUS at 10:12

## 2018-12-20 RX ADMIN — HYDROMORPHONE HYDROCHLORIDE 4 MG: 2 INJECTION INTRAMUSCULAR; INTRAVENOUS; SUBCUTANEOUS at 11:12

## 2018-12-20 NOTE — H&P
NEUROSURGICAL PROGRESS NOTE     DATE OF SERVICE:  12/20/2018     ATTENDING PHYSICIAN:  Ezequiel Pedroza MD     SUBJECTIVE:  PRIOR HISTORY:     This is a very pleasant 58 y.o. male who had right hip fusion in the 70's,  a MIS laminectomy in [place] in 2005 and a L3 to S1 posterolateral fusion with instrumentation at [facility] in 2015. Does not smoke tobacco but smoke THC for pain control. He has narcotic dependence since his back fusion surgery. He reports worsening low back pain since his lumbar fusion. Pain is felt from L3 to S1. No leg pain but reports bilateral S1 distribution paresthesia and numbness when he stands up. Also complains of bilateral C8 distribution numbness at night, hands weakness, difficulty opening jars. Not dropping things. Gait imbalance at baseline due to the hip fusion. Is actively doing PT and wears a LSO back brace for comfort.      INTERIM HISTORY:     Here today to discuss the results of his imaging. Symptoms have remained the same. Most of the low back pain is felt at L5-S1 on the right side and irradiates in the posterior legs above the knee.      Low Back Pain Scale  R Low Back-Pain Score: 8  R Low Back-Pain Intensity: I can tolerate the pain I have without having to use pain killers  R Low Back-Pain Score: I can look after myself normally without causing extra pain  Low Back-Lifting: I can only lift very light weights   Low Back-Walking: Pain prevents me walking more than .25 mile   Low Back-Sitting: Pain prevents me from sitting more than than 10 minutes   Low Back-Standing: I cannot stand for longer than 30 mins without increasing pain   Low Back-Sleeping: Because of pain my normal nights sleep is reduced by less than one half   Low Back-Social Life: I have hardly any social life because of the pain   Low Back-Traveling: I have extra pain while traveling but it does not compel me to seek alternate forms of travel   Low Back-Changing Degree of Pain: My pain is gradually  worsening        PAST MEDICAL HISTORY:       Active Ambulatory Problems     Diagnosis Date Noted    Bursitis of hip 05/30/2014    DDD (degenerative disc disease), lumbosacral 05/30/2014    Lumbar spondylosis 05/30/2014    Lumbar radiculopathy 05/30/2014    History of back surgery 05/30/2014    Lumbar spinal stenosis 05/30/2014    Hx of surgical fusion joint 05/30/2014    Thoracic or lumbosacral neuritis or radiculitis, unspecified 06/20/2014    Positive cardiac stress test      Chest pain 08/23/2016    Dyspnea 08/23/2016           Resolved Ambulatory Problems     Diagnosis Date Noted    No Resolved Ambulatory Problems           Past Medical History:   Diagnosis Date    Hepatitis C      Positive cardiac stress test      Positive D dimer           PAST SURGICAL HISTORY:        Past Surgical History:   Procedure Laterality Date    HIP SURGERY Right       hip fusion    INJECTION-STEROID-EPIDURAL-LUMBAR N/A 6/20/2014     [facility] OR    LAMINECTOMY         Lumbar    Myelogram N/A 11/5/2018     Performed by [facility] at [facility] OR [address] [place] [address]    MYELOGRAPHY N/A 11/5/2018     Procedure: Myelogram;  Surgeon: [facility] Diagnostic Provider;  Location: [facility] OR [address] [place] [address];  Service: Radiology;  Laterality: N/A;    right wrist surgery             SOCIAL HISTORY:   Social History               Socioeconomic History    Marital status:        Spouse name: Not on file    Number of children: Not on file    Years of education: Not on file    Highest education level: Not on file   Social Needs    Financial resource strain: Not on file    Food insecurity - worry: Not on file    Food insecurity - inability: Not on file    Transportation needs - medical: Not on file    Transportation needs - non-medical: Not on file   Occupational History    Not on file   Tobacco Use    Smoking status: Never Smoker   Substance and Sexual Activity    Alcohol use: No    Drug use:  "Not on file    Sexual activity: Not on file   Other Topics Concern    Not on file   Social History Narrative    Not on file            FAMILY HISTORY:        Family History   Problem Relation Age of Onset    Diabetes Sister      Hypertension Sister      Diabetes Brother      Hypertension Brother           CURRENTS MEDICATIONS:         Current Outpatient Medications on File Prior to Visit   Medication Sig Dispense Refill    aspirin 81 MG Chew Take 81 mg by mouth once daily.        olmesartan-hydrochlorothiazide (BENICAR HCT) 20-12.5 mg per tablet Take 1 tablet by mouth once daily.        oxycodone-acetaminophen (PERCOCET)  mg per tablet Take 1 tablet by mouth every 4 (four) hours as needed for Pain.          No current facility-administered medications on file prior to visit.          ALLERGIES:        Review of patient's allergies indicates:   Allergen Reactions    Gabapentin Itching       C/O med causing bump like rash on lower trunk    Penicillins Rash       Pt states a "a couple of years ago" he took penicillin and broke out in "red spots" with no itching or difficulty breathing         REVIEW OF SYSTEMS:  Review of Systems   Constitutional: Negative for diaphoresis, fever and weight loss.   Respiratory: Negative for shortness of breath.    Cardiovascular: Negative for chest pain.   Gastrointestinal: Negative for blood in stool.   Genitourinary: Negative for hematuria.   Endo/Heme/Allergies: Does not bruise/bleed easily.   All other systems reviewed and are negative.           OBJECTIVE:     PHYSICAL EXAMINATION:       Vitals:     11/19/18 1326   BP: 115/77   Pulse: 86         Physical Exam:  Vitals reviewed.     Constitutional: He appears well-developed and well-nourished.      Eyes: Pupils are equal, round, and reactive to light. Conjunctivae and EOM are normal.      Cardiovascular: Normal distal pulses and no edema.      Abdominal: Soft.      Skin: Skin displays no rash on trunk and no rash " on extremities. Skin displays no lesions on trunk and no lesions on extremities.     Psych/Behavior: He is alert. He is oriented to person, place, and time. He has a normal mood and affect.     Musculoskeletal:        Neck: Range of motion is full.     Neurological:        DTRs: Tricep reflexes are 2+ on the right side and 2+ on the left side. Bicep reflexes are 2+ on the right side and 2+ on the left side. Brachioradialis reflexes are 2+ on the right side and 2+ on the left side. Patellar reflexes are 2+ on the right side and 2+ on the left side. Achilles reflexes are 2+ on the right side and 2+ on the left side.         Back Exam      Tenderness   The patient is experiencing tenderness in the lumbar and sacroiliac.     Range of Motion   Extension: normal   Flexion: abnormal   Lateral bend right: abnormal   Lateral bend left: abnormal   Rotation right: abnormal   Rotation left: abnormal      Muscle Strength   Right Quadriceps:  5/5   Left Quadriceps:  5/5   Right Hamstrings:  5/5   Left Hamstrings:  5/5                        Neurologic Exam      Mental Status   Oriented to person, place, and time.   Speech: speech is normal   Level of consciousness: alert     Cranial Nerves   Cranial nerves II through XII intact.      CN III, IV, VI   Pupils are equal, round, and reactive to light.  Extraocular motions are normal.      Motor Exam   Muscle bulk: normal  Overall muscle tone: normal     Strength   Right deltoid: 5/5  Left deltoid: 5/5  Right biceps: 5/5  Left biceps: 5/5  Right triceps: 5/5  Left triceps: 5/5  Right wrist flexion: 5/5  Left wrist flexion: 5/5  Right wrist extension: 5/5  Left wrist extension: 5/5  Right interossei: 5/5  Left interossei: 5/5  Left iliopsoas: 5/5  Right quadriceps: 5/5  Left quadriceps: 5/5  Right hamstrin/5  Left hamstrin/5  Right anterior tibial: 5/5  Left anterior tibial: 5/5  Right posterior tibial: 5/5  Left posterior tibial: 5/5  Right peroneal: 5/5  Left peroneal:  5/5  Right gastroc: 5/5  Left gastroc: 5/5     Sensory Exam   Light touch normal.   Pinprick normal.      Gait, Coordination, and Reflexes      Gait  Gait: normal     Coordination   Finger to nose coordination: normal  Tandem walking coordination: normal     Reflexes   Right brachioradialis: 2+  Left brachioradialis: 2+  Right biceps: 2+  Left biceps: 2+  Right triceps: 2+  Left triceps: 2+  Right patellar: 2+  Left patellar: 2+  Right achilles: 2+  Left achilles: 2+  Right plantar: normal  Left plantar: normal  Right Schulz: absent  Left Schulz: absent  Right ankle clonus: absent  Left ankle clonus: absent           DIAGNOSTIC DATA:  I personally interpreted the following imaging:   Scoliosis films shows SVA 70, PI-LL mismatch of 12  CT myelogram shows L3 to L5 consolidation of fusion, no fusion at L5-S1, bilateral S1 screws were installed through the facet joint line and there is lucency around the bilateral S1 screws. Cervical spondylosis without significant stenosis.      ASSESMENT:  This is a 58 y.o. male with          Problem List Items Addressed This Visit      None               Visit Diagnoses      S/P lumbar fusion    -  Primary     Lumbar pseudoarthrosis              Loosening of spinal hardware        PLAN:  I explained the natural history of the disease and all treatment options. I recommended a removal of L3 to S1 instrumentation, revision of L4-5, L5-S1 posterolateral fusion, L5-S1 transforaminal interbody fusion, L3 to pelvis instrumentation.      We have discussed the risks of surgery including bleeding, infection, failure of surgery, CSF leak, nerve root injury, spinal cord injury, weakness, paralysis, peripheral neuropathy, malplaced hardware, migration of hardware, non-union, need for reoperation. Patient understands the risks and would like to proceed with surgery.                   Ezequiel Pedroza MD  Cell:[phone]

## 2018-12-20 NOTE — PLAN OF CARE
Patient sitting up in bed, no family present. Patient aaox3. Vss. Speaks and understands english and does not require an . Patient does not need security for valuables. Oriented the patient to the room and pre/ post procedure protocol. Verbalized understanding.

## 2018-12-20 NOTE — PROGRESS NOTES
Certification of Assistant at Surgery       Surgery Date: 12/20/2018     Participating Surgeons:  Surgeon(s) and Role:     * Ezequiel Pedroza MD - Primary     * Wes Humphries PA-C - Assisting    Procedures:  Procedure(s) (LRB):  FUSION, SPINE, WITH INSTRUMENTATION Removal of spinal hardware, Revision of Posterolateral fusion from L3-S1, L5-S1 Interbody fusion L3-Pelvis Instrumentation (N/A)    Assistant Surgeon's Certification of Necessity:  I understand that section 1842 (b) (6) (d) of the Social Security Act generally prohibits Medicare Part B reasonable charge payment for the services of assistants at surgery in teaching hospitals when qualified residents are available to furnish such services. I certify that the services for which payment is claimed were medically necessary, and that no qualified resident was available to perform the services. I further understand that these services are subject to post-payment review by the Medicare carrier.      Wes Humphries PA-C    12/20/2018  3:00 PM

## 2018-12-20 NOTE — ANESTHESIA POSTPROCEDURE EVALUATION
"Anesthesia Post Evaluation    Patient: Ezekiel Noyola    Procedure(s) Performed: Procedure(s) (LRB):  FUSION, SPINE, WITH INSTRUMENTATION Removal of spinal hardware, Bilateral Jamison-White Osteotomy L5-S1, L5-S1 Interbody Arthrodesis, L4-S1 Segmental Instrumentation with Posterior Lateral Arthrodesis (N/A)    Final Anesthesia Type: general  Patient location during evaluation: PACU  Patient participation: Yes- Able to Participate  Level of consciousness: awake and alert  Post-procedure vital signs: reviewed and stable  Pain management: adequate  Airway patency: patent  PONV status at discharge: No PONV  Anesthetic complications: no      Respiratory status: unassisted          Visit Vitals  /62   Pulse 84   Temp 36.6 °C (97.8 °F)   Resp 17   Ht 6' 2" (1.88 m)   Wt 100.7 kg (222 lb)   SpO2 96%   BMI 28.50 kg/m²       Pain/Jose A Score: Pain Rating Prior to Med Admin: 7 (12/20/2018  3:45 PM)        "

## 2018-12-20 NOTE — TRANSFER OF CARE
"Anesthesia Transfer of Care Note    Patient: Ezekiel Noyola    Procedure(s) Performed: Procedure(s) (LRB):  FUSION, SPINE, WITH INSTRUMENTATION Removal of spinal hardware, Revision of Posterolateral fusion from L3-S1, L5-S1 Interbody fusion L3-Pelvis Instrumentation (N/A)    Patient location: PACU    Anesthesia Type: general    Transport from OR: Transported from OR on 6-10 L/min O2 by face mask with adequate spontaneous ventilation    Post pain: adequate analgesia    Post assessment: no apparent anesthetic complications and tolerated procedure well    Post vital signs: stable    Level of consciousness: awake, alert and oriented    Nausea/Vomiting: no nausea/vomiting    Complications: none    Transfer of care protocol was followed      Last vitals:   Visit Vitals  /81 (BP Location: Left arm, Patient Position: Sitting)   Pulse 70   Temp 36.6 °C (97.9 °F) (Skin)   Resp 16   Ht 6' 2" (1.88 m)   Wt 100.7 kg (222 lb)   SpO2 98%   BMI 28.50 kg/m²     "

## 2018-12-20 NOTE — ANESTHESIA PROCEDURE NOTES
Arterial    Diagnosis: lumbar pseudoarthritis    Patient location during procedure: done in OR  Procedure start time: 12/20/2018 10:26 AM  Timeout: 12/20/2018 10:25 AM  Procedure end time: 12/20/2018 10:35 AM  Staffing  Anesthesiologist: Chad Lawrence MD  Resident/CRNA: Fab Simeon CRNA  Performed: resident/CRNA   Anesthesiologist was present at the time of the procedure.  Preanesthetic Checklist  Completed: patient identified, site marked, surgical consent, pre-op evaluation, timeout performed, IV checked, risks and benefits discussed, monitors and equipment checked and anesthesia consent givenArterial  Skin Prep: chlorhexidine gluconate  Local Infiltration: none  Orientation: left  Location: radial  Catheter Size: 20 G  Catheter placement by Anatomical landmarks. Heme positive aspiration all ports.Insertion Attempts: 2  Assessment  Dressing: secured with tape and tegaderm  Patient: Tolerated well

## 2018-12-20 NOTE — OP NOTE
DATE OF PROCEDURE: 12/20/2018      PREOPERATIVE DIAGNOSES:      1. S/P L3 to S1 fusion  2. Pseudoarthsosis at L5-S1 with failure of instrumentation  3. Severe bilateral L5-S1 foraminal stenosis  4. Chronic refractory back pain and bilateral legs pain  5. Sagittal plane imbalance with lumbar lordosis-pelvic incidence mismatch      POSTOPERATIVE DIAGNOSES:      1. S/P L3 to S1 fusion  2. Pseudoarthsosis at L5-S1 with failure of instrumentation  3. Severe bilateral L5-S1 foraminal stenosis  4. Chronic refractory back pain and bilateral legs pain  5. Sagittal plane imbalance with lumbar lordosis-pelvic incidence mismatch      NAME OF OPERATIONS:  1. Posterior open approach to the lumbar spine.  2. Removal of posterior instrumentation at L3-4, L4-5, L5-S1  3. Bilateral Fernando osteotomies at L5-S1 to improve lordosis at L5-S1  4. Left transforaminal interbody arthrodesis at L5-S1 using Vivigen cellular allograft and autograft taken from the same incision  5. Placement of an Globus Rise TLIF interbody cage  6. L4-5 and L5-S1 revision of posterolateral arthrodesis with autograft harvested from the same incision and Vivigen allograft  7. Segmental instrumentation at L4-5 and L5-S1  8. Neurophysiologic monitoring  9. Fluoroscopic localization.      PRIMARY SURGEON: Ezequiel Pedroza MD     ASSISTANT SURGEON: Wes POLK was the first assistant for this procedure as there was no qualified resident available to assist.      INDICATION: This is a 58 y.o. male history of L3 to S1 laminectomy and posterolateral instrumented fusion performed at [facility] in 2015. He developed worsening low back pain and bilateral legs pain with difficulty walking. Scoliosis films shows positive sagittal balance and a pelvic incidence/lumbar lordosis mismatch greater than 20 degrees. Ct scan of the lumbar spine shows that the right S1 screw was in the canal compressing the traversing S1 nerve root. He also has severe bilateral L5-S1  foramnal stenosis.  The risks included hemorrhage, vascular injury, nerve root injury, spinal cord injury that can cause paralysis, hardware failure and subsidence that requires reoperation as well as hardware and screw malpositioning, CSF leak, coma and death. The patient wanted to proceed and consent was obtained.      DESCRIPTION OF THE PROCEDURE:   The patient was intubated under general anesthesia and all pressure points were carefully padded. The patient was flipped in prone position the 4 posts Rayray table. A midline incision was then planned from L3 to S1 using fluoroscopic localization. The patient was prepped and draped in a typical sterile fashion. An incision was made with a 15-blade. The fascial layer was then dissected with the Bovie and orthostatic retractors were placed. The midline avascular plane was dissected to expose the spinous processes below L3. Subperiosteal dissection was carried out with the Bovie. Exposure was then completed in subperiosteal fashion from the inferior L2 to S1.      We removed the prior instrumentation from L3 to S1 including 8 pedicle screws, caps and 2 rods.    The L5-S1 laminectomy was revised and widened.     Using osteomes we removed the inferior facet of L5 and the superior facet of S1 bilaterally. We decompressed the foramen completely and identified the bilateral L5 nerve roots. We did an annulotomy and using shaver scraped the end-plate of L5-S1 on he left side. Discectomy was completed with kalee and the the enplate were scraped. We packed the disc space with autograft and Vivigen and we then positioned an expandable [id number] mm 15 degrees Rise titanium cage filled with Vivigen in the L5-S1 disc space to complete the interbody arthrodesis. Correct positioning of the cage was confirmed with fluoroscopy.     We cannulated the pedicles of S1 bilaterally and on the left side at L5 because the old screws at these levels were breaching through the facet joints,  in the canal or laterally. We used the previous trajectories for the bilateral L4 screws and the left L5 screw.  The trajectories were confirmed with the ball feeler and we inserted our pedicle screws. We inserted 6.5 x 45 mm screws at L4 bilaterally, 6.5 x 45  mm screw at L5 bilaterally and 6.5 x 40 mm screw at S1 bilaterally.      We used hyperlordotic titanium rods bilaterally and engaged the rods with screw caps.      We did manual compression between L5 and S1 to increase the lordosis and tighten the caps. No SSEP or EMG change was noted during the compression.      The caps were torqued.       We irrigated with abundant physiologic fluid.     We decorticated to exposed bone from L4 to S1 and we used our mixture of autograft and allograft and placed it medially and laterally to the instrumentation to fuse L4-5 and L5-S1. We placed an hemovac drain and fixated it with 2.0 Nylon. The wound was closed in four layers. The muscular layer was closed with interrupted 0 Vicryl. The thoracolumbar fascia was closed with running and interrupted 0 Vicryl. The subcutaneous layer was closed with inverted 2-0 Vicryl and the skin was closed with staples. The wound was then dressed and the drains were fixed to the skin with a 2-0 nylon suture. The patient was then sent to recovery under the care of the anesthesiologist. The blood loss was 500 mL. The final count was completed and nothing was missing. There was no complication.

## 2018-12-21 LAB
ANION GAP SERPL CALC-SCNC: 7 MMOL/L
BASOPHILS # BLD AUTO: 0 K/UL
BASOPHILS NFR BLD: 0 %
BUN SERPL-MCNC: 10 MG/DL
CALCIUM SERPL-MCNC: 8.8 MG/DL
CHLORIDE SERPL-SCNC: 104 MMOL/L
CO2 SERPL-SCNC: 28 MMOL/L
CREAT SERPL-MCNC: 0.9 MG/DL
DIFFERENTIAL METHOD: ABNORMAL
EOSINOPHIL # BLD AUTO: 0 K/UL
EOSINOPHIL NFR BLD: 0 %
ERYTHROCYTE [DISTWIDTH] IN BLOOD BY AUTOMATED COUNT: 14.3 %
EST. GFR  (AFRICAN AMERICAN): >60 ML/MIN/1.73 M^2
EST. GFR  (NON AFRICAN AMERICAN): >60 ML/MIN/1.73 M^2
GLUCOSE SERPL-MCNC: 119 MG/DL
HCT VFR BLD AUTO: 35.5 %
HGB BLD-MCNC: 11.4 G/DL
LYMPHOCYTES # BLD AUTO: 1.2 K/UL
LYMPHOCYTES NFR BLD: 12.1 %
MCH RBC QN AUTO: 27.3 PG
MCHC RBC AUTO-ENTMCNC: 32.1 G/DL
MCV RBC AUTO: 85 FL
MONOCYTES # BLD AUTO: 1.3 K/UL
MONOCYTES NFR BLD: 12.7 %
NEUTROPHILS # BLD AUTO: 7.4 K/UL
NEUTROPHILS NFR BLD: 75 %
PLATELET # BLD AUTO: 153 K/UL
PMV BLD AUTO: 10.3 FL
POTASSIUM SERPL-SCNC: 4.5 MMOL/L
RBC # BLD AUTO: 4.18 M/UL
SODIUM SERPL-SCNC: 139 MMOL/L
WBC # BLD AUTO: 9.92 K/UL

## 2018-12-21 PROCEDURE — 94761 N-INVAS EAR/PLS OXIMETRY MLT: CPT

## 2018-12-21 PROCEDURE — 97165 OT EVAL LOW COMPLEX 30 MIN: CPT

## 2018-12-21 PROCEDURE — 94799 UNLISTED PULMONARY SVC/PX: CPT

## 2018-12-21 PROCEDURE — 11000001 HC ACUTE MED/SURG PRIVATE ROOM

## 2018-12-21 PROCEDURE — 97530 THERAPEUTIC ACTIVITIES: CPT

## 2018-12-21 PROCEDURE — 63600175 PHARM REV CODE 636 W HCPCS: Performed by: NEUROLOGICAL SURGERY

## 2018-12-21 PROCEDURE — 36415 COLL VENOUS BLD VENIPUNCTURE: CPT

## 2018-12-21 PROCEDURE — 25000003 PHARM REV CODE 250: Performed by: NEUROLOGICAL SURGERY

## 2018-12-21 PROCEDURE — 25000003 PHARM REV CODE 250: Performed by: PHYSICIAN ASSISTANT

## 2018-12-21 PROCEDURE — G8979 MOBILITY GOAL STATUS: HCPCS | Mod: CH

## 2018-12-21 PROCEDURE — 97161 PT EVAL LOW COMPLEX 20 MIN: CPT

## 2018-12-21 PROCEDURE — 97116 GAIT TRAINING THERAPY: CPT

## 2018-12-21 PROCEDURE — G8978 MOBILITY CURRENT STATUS: HCPCS | Mod: CJ

## 2018-12-21 PROCEDURE — 85025 COMPLETE CBC W/AUTO DIFF WBC: CPT

## 2018-12-21 PROCEDURE — 63600175 PHARM REV CODE 636 W HCPCS: Performed by: PHYSICIAN ASSISTANT

## 2018-12-21 PROCEDURE — 80048 BASIC METABOLIC PNL TOTAL CA: CPT

## 2018-12-21 RX ADMIN — OXYCODONE HYDROCHLORIDE 10 MG: 5 TABLET ORAL at 10:12

## 2018-12-21 RX ADMIN — CELECOXIB 200 MG: 100 CAPSULE ORAL at 08:12

## 2018-12-21 RX ADMIN — HEPARIN SODIUM 5000 UNITS: 5000 INJECTION, SOLUTION INTRAVENOUS; SUBCUTANEOUS at 10:12

## 2018-12-21 RX ADMIN — HEPARIN SODIUM 5000 UNITS: 5000 INJECTION, SOLUTION INTRAVENOUS; SUBCUTANEOUS at 06:12

## 2018-12-21 RX ADMIN — HYDROCHLOROTHIAZIDE 12.5 MG: 12.5 CAPSULE ORAL at 08:12

## 2018-12-21 RX ADMIN — ACETAMINOPHEN 650 MG: 325 TABLET, FILM COATED ORAL at 12:12

## 2018-12-21 RX ADMIN — TRAMADOL HYDROCHLORIDE 100 MG: 50 TABLET, COATED ORAL at 05:12

## 2018-12-21 RX ADMIN — VANCOMYCIN HYDROCHLORIDE 1500 MG: 10 INJECTION, POWDER, LYOPHILIZED, FOR SOLUTION INTRAVENOUS at 10:12

## 2018-12-21 RX ADMIN — TRAMADOL HYDROCHLORIDE 100 MG: 50 TABLET, COATED ORAL at 06:12

## 2018-12-21 RX ADMIN — OXYCODONE HYDROCHLORIDE 10 MG: 5 TABLET ORAL at 05:12

## 2018-12-21 RX ADMIN — MUPIROCIN 1 G: 20 OINTMENT TOPICAL at 09:12

## 2018-12-21 RX ADMIN — ACETAMINOPHEN 650 MG: 325 TABLET, FILM COATED ORAL at 04:12

## 2018-12-21 RX ADMIN — HEPARIN SODIUM 5000 UNITS: 5000 INJECTION, SOLUTION INTRAVENOUS; SUBCUTANEOUS at 02:12

## 2018-12-21 RX ADMIN — MUPIROCIN 1 G: 20 OINTMENT TOPICAL at 08:12

## 2018-12-21 RX ADMIN — ACETAMINOPHEN 650 MG: 325 TABLET, FILM COATED ORAL at 08:12

## 2018-12-21 RX ADMIN — TRAMADOL HYDROCHLORIDE 100 MG: 50 TABLET, COATED ORAL at 12:12

## 2018-12-21 RX ADMIN — CYCLOBENZAPRINE HYDROCHLORIDE 10 MG: 10 TABLET, FILM COATED ORAL at 08:12

## 2018-12-21 RX ADMIN — ACETAMINOPHEN 650 MG: 325 TABLET, FILM COATED ORAL at 09:12

## 2018-12-21 RX ADMIN — CYCLOBENZAPRINE HYDROCHLORIDE 10 MG: 10 TABLET, FILM COATED ORAL at 09:12

## 2018-12-21 RX ADMIN — CYCLOBENZAPRINE HYDROCHLORIDE 10 MG: 10 TABLET, FILM COATED ORAL at 02:12

## 2018-12-21 RX ADMIN — CELECOXIB 200 MG: 100 CAPSULE ORAL at 09:12

## 2018-12-21 RX ADMIN — LOSARTAN POTASSIUM 50 MG: 50 TABLET, FILM COATED ORAL at 08:12

## 2018-12-21 NOTE — NURSING
PT here to work with pt, who refused as he complained of severe cramping in Left leg rendering him unable to stand. Refused to go downstairs for X-Ray and stated that he could not stand even if brought down on stretcher and medicated for pain. Dr Pedroza unable to be reached by phone. Will inform MD when possible.

## 2018-12-21 NOTE — PLAN OF CARE
Problem: Occupational Therapy Goal  Goal: Occupational Therapy Goal  Goals to be met by: 1/21/18     Patient will increase functional independence with ADLs by performing:    LE Dressing with Stand-by Assistance.  Grooming while standing with Stand-by Assistance.  Toileting from toilet with Stand-by Assistance for hygiene and clothing management.   Supine to sit with Stand-by Assistance.  Step transfer with Stand-by Assistance  Toilet transfer to toilet with Stand-by Assistance.    Outcome: Ongoing (interventions implemented as appropriate)  Pt with increased pain and muscle spasms to LLE during first session; reporting inability to ambulate at this time. Only tolerated bed mobility x 2 trials and stands x 2 from EOB. Second session, pt able to ambulate x 2 trials with RW and decreased pain. Will recommend OP PT And possible RW

## 2018-12-21 NOTE — PT/OT/SLP EVAL
Occupational Therapy   Evaluation    Name: Ezekiel Noyola  MRN: [id number]  Admitting Diagnosis:  Spinal stenosis of lumbar region with radiculopathy 1 Day Post-Op    Recommendations:     Discharge Recommendations: (OP PT )  Discharge Equipment Recommendations:  walker, rolling  Barriers to discharge:  Decreased caregiver support    History:     Occupational Profile:  Living Environment: Pt lives alone in 1st floor apartment, 4 steps with rails, tub/shower combo; pt reports apartment is handicap accessible   Previous level of function: independent/mod I; does not drive  Equipment Used at Home:  (elevated toilet seat )  Assistance upon Discharge: limited     Past Medical History:   Diagnosis Date    Gout     Hepatitis C     Hypertension     Positive cardiac stress test     Positive D dimer        Past Surgical History:   Procedure Laterality Date    BACK SURGERY      FRACTURE SURGERY Left     leg    FUSION, SPINE, WITH INSTRUMENTATION Removal of spinal hardware, Bilateral Jamison-White Osteotomy L5-S1, L5-S1 Interbody Arthrodesis, L4-S1 Segmental Instrumentation with Posterior Lateral Arthrodesis N/A 12/20/2018    Per[facility] OR    HIP SURGERY Right     hip fusion    INJECTION-STEROID-EPIDURAL-LUMBAR N/A 6/20/2014    Per[facility] Tyson Neely Jr[facility] at [facility] OR    KNEE ARTHROSCOPY      LAMINECTOMY      Lumbar    Myelogram N/A 11/5/2018    Performed by [facility] Provider at [facility] OR 2ND FLR    right wrist surgery         Subjective     Chief Complaint: pain; LLE muscle spasms   Patient/Family Comments/goals: return to PLOF     Pain/Comfort:  · Pain Rating 1: (does not rate )  · Location - Side 1: Left  · Location - Orientation 1: generalized  · Location 1: leg  · Pain Addressed 1: Reposition, Distraction, Pre-medicate for activity, Nurse notified, Cessation of Activity    Patients cultural, spiritual, Anabaptism conflicts given the current situation:      Objective:      Communicated with: abram prior to session.    General Precautions: Standard, fall   Orthopedic Precautions:spinal precautions   Braces: LSO     Occupational Performance:    Bed Mobility:    · Patient completed Scooting/Bridging with stand by assistance and contact guard assistance  · Patient completed Supine to Sit with stand by assistance and contact guard assistance  · Patient completed Sit to Supine with stand by assistance and contact guard assistance    Functional Mobility/Transfers:  · Patient completed Sit <> Stand Transfer with contact guard assistance  with  rolling walker   · Functional Mobility: CGA- SBA with RW; 2 trials see PT note     Activities of Daily Living:  · Upper Body Dressing: [facility] gown and LSO brace     Cognitive/Visual Perceptual:  Cognitive/Psychosocial Skills:     -       Oriented to: Person, Place, Time and Situation   -       Follows Commands/attention:Follows multistep  commands  -       Communication: clear/fluent  -       Memory: No Deficits noted  -       Safety awareness/insight to disability: intact   -       Mood/Affect/Coping skills/emotional control: Appropriate to situation    Physical Exam:  Balance:    -       sitting balance WFL in adaptive posture; CGA- SBA standing   Postural examination/scapula alignment:    -       Rounded shoulders  Skin integrity: Wound surgical lower back   Dominant hand:    -       right  Upper Extremity Range of Motion:   WFL based on observed function  Upper Extremity Strength:  BUE WFL based on observed function     AMPAC 6 Click ADL:  AMPAC Total Score: 20    Treatment & Education:  Pt performing bed mobility x 3 trials over 2 sessions as above   R hip fusion causing difficulties in maintaining back precautions upon sitting EOB 2/2 required positioning   Functional mobility in hallway x 2 trials second session with RW   SPoke with pt regarding adaptive sequencing for ADLs at home to maintain back precautions pt  "reports no concerns as this is no his first surgery     Education:    Patient left supine with all lines intact, call button in reach, bed alarm on and nsg notified    Assessment:     Ezekiel Noyola is a 58 y.o. male with a medical diagnosis of Spinal stenosis of lumbar region with radiculopathy.  He presents with the following performance deficits affecting function: impaired self care skills, weakness, impaired endurance, impaired functional mobilty, gait instability, pain, impaired balance, decreased lower extremity function, decreased ROM, impaired skin, impaired joint extensibility, orthopedic precautions.  Pt with increased pain and muscle spasms to LLE during first session; reporting inability to ambulate at this time. Only tolerated bed mobility x 2 trials and stands x 2 from EOB. Second session, pt able to ambulate x 2 trials with RW and decreased pain. Will recommend OP PT And possible RW    Rehab Prognosis: Good; patient would benefit from acute skilled OT services to address these deficits and reach maximum level of function.         Clinical Decision Makin.  OT Low:  "Pt evaluation falls under low complexity for evaluation coding due to performance deficits noted in 1-3 areas as stated above and 0 co-morbities affecting current functional status. Data obtained from problem focused assessments. No modifications or assistance was required for completion of evaluation. Only brief occupational profile and history review completed."     Plan:     Patient to be seen 5 x/week to address the above listed problems via self-care/home management, therapeutic activities, therapeutic exercises  · Plan of Care Expires: 19  · Plan of Care Reviewed with: patient    This Plan of care has been discussed with the patient who was involved in its development and understands and is in agreement with the identified goals and treatment plan    GOALS:   Multidisciplinary Problems     Occupational Therapy Goals        " Problem: Occupational Therapy Goal    Goal Priority Disciplines Outcome Interventions   Occupational Therapy Goal     OT, PT/OT Ongoing (interventions implemented as appropriate)    Description:  Goals to be met by: 1/21/18     Patient will increase functional independence with ADLs by performing:    LE Dressing with Stand-by Assistance.  Grooming while standing with Stand-by Assistance.  Toileting from toilet with Stand-by Assistance for hygiene and clothing management.   Supine to sit with Stand-by Assistance.  Step transfer with Stand-by Assistance  Toilet transfer to toilet with Stand-by Assistance.                      Time Tracking:     OT Date of Treatment: 12/21/18  OT Start Time: 1323  OT Stop Time: 1331  OT Total Time (min): 8 min  1st session: [id number] evaluation     Billable Minutes:Evaluation 20 min   Therapeutic Activity 8    Kassidy Jhaveri OT  12/21/2018

## 2018-12-21 NOTE — PLAN OF CARE
This  put name on white board and explained blue discharge folder to patient. Discharge planning brochure and/or business card given to patient.  Patient verbalized understanding.    TN met with patient. States that PTA he was living alone and independent. Pt states that he has a son and friends that help bring him to and from hospitals. He also takes public transportation if needed. Pt may have need for rolling walker at d/c. Follow up appts already scheduled and pt is aware.     Future Appointments   Date Time Provider Department Center   1/8/2019  9:00 AM [facility]   1/8/2019  9:20 AM Wes Humphries PA-C [facility] [place] [facility]   2/4/2019  3:15 PM [facility]   2/4/2019  3:30 PM Ezequiel Pedroza MD [facility]        12/21/18 1419   Discharge Assessment   Assessment Type Discharge Planning Assessment   Assessment information obtained from? Patient;Medical Record   Expected Length of Stay (days) 2   Communicated expected length of stay with patient/caregiver yes   Prior to hospitilization cognitive status: Alert/Oriented   Prior to hospitalization functional status: Independent   Current cognitive status: Alert/Oriented   Current Functional Status: Independent   Lives With alone   Able to Return to Prior Arrangements yes   Is patient able to care for self after discharge? Yes   Who are your caregiver(s) and their phone number(s)? Jass (Son) [phone]   Patient's perception of discharge disposition home or selfcare   Readmission Within the Last 30 Days no previous admission in last 30 days   Patient currently being followed by outpatient case management? No   Patient currently receives any other outside agency services? No   Equipment Currently Used at Home raised toilet   Do you have any problems affording any of your prescribed medications? No   Is the patient taking medications as prescribed? yes   Does the patient  have transportation home? Yes   Transportation Anticipated family or friend will provide   Does the patient receive services at the [facility]madin Clinic? No   Discharge Plan A Home   Discharge Plan B Home  (May need PT)

## 2018-12-21 NOTE — NURSING
Attempted to complete admit assessment.  Lights are off and no answer when name is called.  Will defer at this time and continue to monitor.

## 2018-12-21 NOTE — PLAN OF CARE
Problem: Adult Inpatient Plan of Care  Goal: Plan of Care Review  Outcome: Ongoing (interventions implemented as appropriate)  Pt made comfortable in room. IV infusing as ordered. Tolerating clear liquids. Voiding without difficulty. Hemovac patent and draining bloody output. . TEDS ans SCDs on . Unable to administer meds until verified by pharmacy. This cannot be done until Creatinine level is obtained on pt.

## 2018-12-21 NOTE — PLAN OF CARE
Problem: Adult Inpatient Plan of Care  Goal: Plan of Care Review  Care plan and teaching plan updated.  24 hour chart review complete.

## 2018-12-21 NOTE — PT/OT/SLP PROGRESS
Physical Therapy Treatment    Patient Name:  Ezekiel Noyola   MRN:  [id number]    Recommendations:     Discharge Recommendations:  other (see comments)(out patient PT)   Discharge Equipment Recommendations: walker, rolling   Barriers to discharge: None    Assessment:     Ezekiel Noyola is a 58 y.o. male admitted with a medical diagnosis of Spinal stenosis of lumbar region with radiculopathy.  He presents with the following impairments/functional limitations:  gait instability, impaired functional mobilty, orthopedic precautions, impaired self care skills, decreased lower extremity function, decreased ROM, pain Patient able to ambulate ~ 120 and 110 ft with seated rest btw trips using RW and SBA.    Rehab Prognosis: Good; patient would benefit from acute skilled PT services to address these deficits and reach maximum level of function.    Recent Surgery: Procedure(s) (LRB):  FUSION, SPINE, WITH INSTRUMENTATION Removal of spinal hardware, Bilateral Jamison-White Osteotomy L5-S1, L5-S1 Interbody Arthrodesis, L4-S1 Segmental Instrumentation with Posterior Lateral Arthrodesis (N/A) 1 Day Post-Op    Plan:     During this hospitalization, patient to be seen daily to address the identified rehab impairments via gait training, therapeutic activities, therapeutic exercises and progress toward the following goals:    · Plan of Care Expires:  01/21/19    Subjective     Chief Complaint: intermittent mm spasms less severe  Patient/Family Comments/goals: go home  Pain/Comfort:  · Pain Rating 1: other (see comments)(did not rate)  · Location - Side 1: Left  · Location - Orientation 1: lateral  · Location 1: leg  · Pain Addressed 1: Reposition, Distraction, Cessation of Activity      Objective:     Communicated with primary nurse prior to session.  Patient found all lines intact and bed alarm on hemovac  upon PT entry to room.     General Precautions: Standard, fall   Orthopedic Precautions:spinal precautions   Braces: LSO     Functional  Mobility:  · Bed Mobility:     · Supine to Sit: independence  · Sit to Supine: independence  · Transfers:     · Sit to Stand:  modified independence with no AD  · Gait: ~ 110 ft X 2 with SBA , RW and LSO  · Balance: fair + with RW      AM-PAC 6 CLICK MOBILITY  Turning over in bed (including adjusting bedclothes, sheets and blankets)?: 4  Sitting down on and standing up from a chair with arms (e.g., wheelchair, bedside commode, etc.): 4  Moving from lying on back to sitting on the side of the bed?: 4  Moving to and from a bed to a chair (including a wheelchair)?: 4  Need to walk in hospital room?: 3  Climbing 3-5 steps with a railing?: 3  Basic Mobility Total Score: 22       Therapeutic Activities and Exercises:   na    Patient left HOB elevated with bed alarm on..    GOALS:   Multidisciplinary Problems     Physical Therapy Goals        Problem: Physical Therapy Goal    Goal Priority Disciplines Outcome Goal Variances Interventions   Physical Therapy Goal     PT, PT/OT Ongoing (interventions implemented as appropriate)     Description:  Goals to be met by: 2018     Patient will increase functional independence with mobility by performin. Supine to sit with Modified [place]  2. Sit to stand transfer with Modified [place]  3. Gait  x 150 feet with Modified [place] using Rolling Walker/or no AD.                       Time Tracking:     PT Received On: 18  PT Start Time: 1323     PT Stop Time: 1350  PT Total Time (min): 27 min     Billable Minutes: Gait Training 17    Treatment Type: Treatment  PT/PTA: PT           Ezequiel Pederson, PT  2018

## 2018-12-21 NOTE — PLAN OF CARE
Problem: Physical Therapy Goal  Goal: Physical Therapy Goal  Goals to be met by: 2018     Patient will increase functional independence with mobility by performin. Supine to sit with Modified [place]  2. Sit to stand transfer with Modified [place]  3. Gait  x 150 feet with Modified [place] using Rolling Walker/or no AD.     Outcome: Ongoing (interventions implemented as appropriate)  Recommend Home HH vs outpatient PT  May need RW

## 2018-12-21 NOTE — PLAN OF CARE
Problem: Adult Inpatient Plan of Care  Goal: Plan of Care Review  Outcome: Ongoing (interventions implemented as appropriate)  Pt on RA with documented sats. Will continue to monitor.

## 2018-12-21 NOTE — PROGRESS NOTES
"Vancomycin Dosing and Monitoring Pharmacy Protocol    Ezekiel Noyola is a 58 y.o. male    Height: 6' 2" (1.88 m)   Wt Readings from Last 3 Encounters:   18 100.7 kg (222 lb)   18 102.1 kg (225 lb)   10/01/18 102.2 kg (225 lb 4.8 oz)       Temp Readings from Last 3 Encounters:   18 98 °F (36.7 °C) (Oral)   18 97.7 °F (36.5 °C) (Temporal)   16 97.8 °F (36.6 °C) (Oral)      Lab Results   Component Value Date/Time    WBC 8.8 2016 07:43 AM    WBC 11.49 (H) 2009 02:00 AM      Lab Results   Component Value Date/Time    CREATININE 1.0 2018 06:03 PM    CREATININE 1.06 2016 07:43 AM    CREATININE 0.8 2009 02:00 AM    No results found for: LACTATE    Serum creatinine: 1 mg/dL 18 1803  Estimated creatinine clearance: 102 mL/min    Antibiotics (From admission, onward)      Start     Stop Route Frequency Ordered    18 2245  vancomycin (VANCOCIN) 1,500 mg in dextrose 5 % 250 mL IVPB      -- IV Every 12 hours (non-standard times) 18 1902    18 2100  mupirocin 2 % ointment 1 g       2059 Nasl 2 times daily 18 1711          Antifungals (From admission, onward)      None            Microbiology Results (last 7 days)       ** No results found for the last 168 hours. **            Indication/Target trough:   Surgical prophylaxis     Hemodialysis:   N/A    Dosing Weight:   Wt Readings from Last 1 Encounters:   18 100.7 kg (222 lb)       Last Vancomycin dose: 1500 mg   Date/Time given: 18 1045          Vancomycin level:  No results for input(s): VANCOMYCIN-TROUGH in the last 72 hours.  No results for input(s): VANCOMYCIN, RANDOM in the last 72 hours.    Per Protocol Initial/Adjustments Dosin. Initial/Adjustment Dose: maintenance dose 1500mg iv q12h   2. Vancomycin Trough Level will be drawn on 18 2215 date/time     Pharmacy will continue to follow.    Please contact if you have any further questions. Thank you.    Ashlie" JANINA Syed, PharmD  [phone]

## 2018-12-22 VITALS
HEART RATE: 79 BPM | DIASTOLIC BLOOD PRESSURE: 62 MMHG | BODY MASS INDEX: 29.71 KG/M2 | TEMPERATURE: 98 F | OXYGEN SATURATION: 98 % | RESPIRATION RATE: 20 BRPM | WEIGHT: 231.5 LBS | HEIGHT: 74 IN | SYSTOLIC BLOOD PRESSURE: 119 MMHG

## 2018-12-22 LAB
ANION GAP SERPL CALC-SCNC: 5 MMOL/L
BASOPHILS # BLD AUTO: 0.03 K/UL
BASOPHILS NFR BLD: 0.3 %
BUN SERPL-MCNC: 10 MG/DL
CALCIUM SERPL-MCNC: 8.6 MG/DL
CHLORIDE SERPL-SCNC: 104 MMOL/L
CO2 SERPL-SCNC: 29 MMOL/L
CREAT SERPL-MCNC: 0.9 MG/DL
DIFFERENTIAL METHOD: ABNORMAL
EOSINOPHIL # BLD AUTO: 0.1 K/UL
EOSINOPHIL NFR BLD: 1.2 %
ERYTHROCYTE [DISTWIDTH] IN BLOOD BY AUTOMATED COUNT: 14.1 %
EST. GFR  (AFRICAN AMERICAN): >60 ML/MIN/1.73 M^2
EST. GFR  (NON AFRICAN AMERICAN): >60 ML/MIN/1.73 M^2
GLUCOSE SERPL-MCNC: 87 MG/DL
HCT VFR BLD AUTO: 35 %
HGB BLD-MCNC: 11.6 G/DL
LYMPHOCYTES # BLD AUTO: 3.2 K/UL
LYMPHOCYTES NFR BLD: 31.1 %
MCH RBC QN AUTO: 27.8 PG
MCHC RBC AUTO-ENTMCNC: 33.1 G/DL
MCV RBC AUTO: 84 FL
MONOCYTES # BLD AUTO: 1.1 K/UL
MONOCYTES NFR BLD: 11 %
NEUTROPHILS # BLD AUTO: 5.7 K/UL
NEUTROPHILS NFR BLD: 56.2 %
PLATELET # BLD AUTO: 155 K/UL
PMV BLD AUTO: 10.2 FL
POTASSIUM SERPL-SCNC: 4 MMOL/L
RBC # BLD AUTO: 4.18 M/UL
SODIUM SERPL-SCNC: 138 MMOL/L
VANCOMYCIN TROUGH SERPL-MCNC: 14.6 UG/ML
WBC # BLD AUTO: 10.21 K/UL

## 2018-12-22 PROCEDURE — 36415 COLL VENOUS BLD VENIPUNCTURE: CPT

## 2018-12-22 PROCEDURE — 85025 COMPLETE CBC W/AUTO DIFF WBC: CPT

## 2018-12-22 PROCEDURE — 3E0234Z INTRODUCTION OF SERUM, TOXOID AND VACCINE INTO MUSCLE, PERCUTANEOUS APPROACH: ICD-10-PCS | Performed by: NEUROLOGICAL SURGERY

## 2018-12-22 PROCEDURE — 80048 BASIC METABOLIC PNL TOTAL CA: CPT

## 2018-12-22 PROCEDURE — 80202 ASSAY OF VANCOMYCIN: CPT

## 2018-12-22 PROCEDURE — 90471 IMMUNIZATION ADMIN: CPT | Performed by: NEUROLOGICAL SURGERY

## 2018-12-22 PROCEDURE — G0009 ADMIN PNEUMOCOCCAL VACCINE: HCPCS | Performed by: NEUROLOGICAL SURGERY

## 2018-12-22 PROCEDURE — 25000003 PHARM REV CODE 250: Performed by: NEUROLOGICAL SURGERY

## 2018-12-22 PROCEDURE — 63600175 PHARM REV CODE 636 W HCPCS: Performed by: PHYSICIAN ASSISTANT

## 2018-12-22 PROCEDURE — 25000003 PHARM REV CODE 250: Performed by: PHYSICIAN ASSISTANT

## 2018-12-22 PROCEDURE — 63600175 PHARM REV CODE 636 W HCPCS: Performed by: NEUROLOGICAL SURGERY

## 2018-12-22 PROCEDURE — 97116 GAIT TRAINING THERAPY: CPT

## 2018-12-22 PROCEDURE — 94761 N-INVAS EAR/PLS OXIMETRY MLT: CPT

## 2018-12-22 PROCEDURE — 90670 PCV13 VACCINE IM: CPT | Performed by: NEUROLOGICAL SURGERY

## 2018-12-22 RX ORDER — OXYCODONE AND ACETAMINOPHEN 10; 325 MG/1; MG/1
1 TABLET ORAL EVERY 4 HOURS PRN
Qty: 90 TABLET | Refills: 0 | Status: SHIPPED | OUTPATIENT
Start: 2018-12-22 | End: 2019-01-08

## 2018-12-22 RX ORDER — CYCLOBENZAPRINE HCL 10 MG
10 TABLET ORAL 3 TIMES DAILY PRN
Qty: 90 TABLET | Refills: 0 | Status: SHIPPED | OUTPATIENT
Start: 2018-12-22 | End: 2019-01-01

## 2018-12-22 RX ADMIN — ACETAMINOPHEN 650 MG: 325 TABLET, FILM COATED ORAL at 07:12

## 2018-12-22 RX ADMIN — TRAMADOL HYDROCHLORIDE 100 MG: 50 TABLET, COATED ORAL at 12:12

## 2018-12-22 RX ADMIN — CELECOXIB 200 MG: 100 CAPSULE ORAL at 08:12

## 2018-12-22 RX ADMIN — TRAMADOL HYDROCHLORIDE 100 MG: 50 TABLET, COATED ORAL at 06:12

## 2018-12-22 RX ADMIN — VANCOMYCIN HYDROCHLORIDE 1500 MG: 10 INJECTION, POWDER, LYOPHILIZED, FOR SOLUTION INTRAVENOUS at 12:12

## 2018-12-22 RX ADMIN — HYDROCHLOROTHIAZIDE 12.5 MG: 12.5 CAPSULE ORAL at 08:12

## 2018-12-22 RX ADMIN — MUPIROCIN 1 G: 20 OINTMENT TOPICAL at 08:12

## 2018-12-22 RX ADMIN — LOSARTAN POTASSIUM 50 MG: 50 TABLET, FILM COATED ORAL at 08:12

## 2018-12-22 RX ADMIN — OXYCODONE HYDROCHLORIDE 10 MG: 5 TABLET ORAL at 07:12

## 2018-12-22 RX ADMIN — PNEUMOCOCCAL 13-VALENT CONJUGATE VACCINE 0.5 ML: 2.2; 2.2; 2.2; 2.2; 2.2; 4.4; 2.2; 2.2; 2.2; 2.2; 2.2; 2.2; 2.2 INJECTION, SUSPENSION INTRAMUSCULAR at 12:12

## 2018-12-22 RX ADMIN — CYCLOBENZAPRINE HYDROCHLORIDE 10 MG: 10 TABLET, FILM COATED ORAL at 08:12

## 2018-12-22 RX ADMIN — HEPARIN SODIUM 5000 UNITS: 5000 INJECTION, SOLUTION INTRAVENOUS; SUBCUTANEOUS at 06:12

## 2018-12-22 RX ADMIN — ACETAMINOPHEN 650 MG: 325 TABLET, FILM COATED ORAL at 12:12

## 2018-12-22 NOTE — PROGRESS NOTES
Progress notes reviewed.  Patient declining Virtual nurse at this time.  Huddles with bedside RN completed.  Patient agreeable to Xray this am, radiology notified and will call for patient in about an hr.

## 2018-12-22 NOTE — PROGRESS NOTES
NEUROSURGICAL POST-OPERATIVE PROGRESS NOTE    DATE OF SERVICE:  12/21/2018      ATTENDING PHYSICIAN:  Ezequiel Pedroza MD    SUBJECTIVE:    INTERIM HISTORY:    This is a very pleasant 58 y.o. y.o. male, who is status day one bilateral L5-S1 Fernando osteotomies, L5-S1 TLIF and reinstrumentation from L4 to S1. Doing well. No significant pain at this time. Walked with PT                OBJECTIVE:    PHYSICAL EXAMINATION:   Vitals:    12/21/18 1658   BP: (!) 112/58   Pulse: 72   Resp: 20   Temp: 97.8 °F (36.6 °C)       Neurosurgery Physical Exam    Ortho Exam    Neurologic Exam     Motor Exam     Strength   Strength 5/5 throughout.       Dressing CDI      DIAGNOSTIC DATA:    X-ray of the lumbar spine is pending    ASSESMENT:    This is a 58 y.o. male who is s/p L4-S1 fusion, Fernando osteotomies at L5-S1 with TLIF.    Problem List Items Addressed This Visit        Neuro    * (Principal) Spinal stenosis of lumbar region with radiculopathy    Relevant Orders    OT evaluate and treat          PLAN:    Doing well.  Would like to be discharged tomorrow morning with Home Health PT-OT  Keep drain overnight  Lumbar XR tomorrow morning.         Ezequiel Pedroza MD  Pager: [phone]

## 2018-12-22 NOTE — NURSING
Discharge instructions and follow up care provided. Received AVS, denies any pain no distress noted. Wheeled off unit per patient escort.

## 2018-12-22 NOTE — PROGRESS NOTES
NEUROSURGICAL POST-OPERATIVE PROGRESS NOTE    DATE OF SERVICE:  12/22/2018      ATTENDING PHYSICIAN:  Ezequiel Pedroza MD    SUBJECTIVE:    INTERIM HISTORY:    This is a very pleasant 58 y.o. y.o. male, who is status day 2 L4 to S1 revision of fusion, L5-S1 Fernando osteotomies, TLIF. Doing well. Mobilizing out of bed by himself. Pain improved compared to before the surgery.                OBJECTIVE:    PHYSICAL EXAMINATION:   Vitals:    12/22/18 0834   BP: 119/62   Pulse: 79   Resp: 20   Temp: 98.4 °F (36.9 °C)       Neurosurgery Physical Exam    Ortho Exam    Neurologic Exam    Wound CDI    DIAGNOSTIC DATA:    X-ray of the lumbar spine pending    ASSESMENT:    This is a 58 y.o. male who is s/p L4-S1 fusion, L5-S1 TLIF with Fernando osteotomies.     Problem List Items Addressed This Visit        Neuro    * (Principal) Spinal stenosis of lumbar region with radiculopathy    Relevant Orders    OT evaluate and treat    Ambulatory referral to Home Health          PLAN:    OK to discharge home with home health PT-OT  Drain removed  Lumbar XR before discharge  FU in 2 weeks for wound check        Ezequiel Pedroza MD  Pager: [phone]

## 2018-12-22 NOTE — PLAN OF CARE
Problem: Adult Inpatient Plan of Care  Goal: Plan of Care Review  Outcome: Ongoing (interventions implemented as appropriate)  Care plan and education reviewed.  24 hour chart review complete.  Patient refused VN rounds.

## 2018-12-22 NOTE — PLAN OF CARE
Problem: Fall Injury Risk  Goal: Absence of Fall and Fall-Related Injury  Outcome: Ongoing (interventions implemented as appropriate)  Pt AAOX4. Denies pain at this time. No distress noted. Safety maintained. Will continue to monitor.

## 2018-12-22 NOTE — PROGRESS NOTES
TN faxed HH orders to [facility], [place] [phone], fax [phone] as well as through [facility]. They are booked solid and can not accept patient    TN faxed HH Orders to [facility], via [facility], [place] [phone], They can not accept patient, booked through Thursday    TN faxed/sent through [facility] orders to [facility]  [phone], fax [phone] awaiting return call    DME: RW orders sent to [facility], [phone], fax [phone], pt wants RW delivered to his home, [address], [address], [place] [address] [phone]

## 2018-12-22 NOTE — PT/OT/SLP PROGRESS
Physical Therapy Treatment    Patient Name:  Ezekiel Noyola   MRN:  [id number]    Recommendations:     Discharge Recommendations:  (Out patient PT)   Discharge Equipment Recommendations: walker, rolling   Barriers to discharge: None    Assessment:     Ezekiel Noyola is a 58 y.o. male admitted with a medical diagnosis of Spinal stenosis of lumbar region with radiculopathy.  He presents with the following impairments/functional limitations:  impaired endurance, impaired functional mobilty, gait instability, impaired balance, decreased lower extremity function, impaired self care skills, decreased ROM, pain, decreased coordination, orthopedic precautions. Pt ambulated ~130 ft with RW, LSO donned, and SBA. Declined to perform therapeutic exercises. Would benefit from continued PT services to address all impairments listed above.    Rehab Prognosis: Good; patient would benefit from acute skilled PT services to address these deficits and reach maximum level of function.    Recent Surgery: Procedure(s) (LRB):  FUSION, SPINE, WITH INSTRUMENTATION Removal of spinal hardware, Bilateral Jamison-White Osteotomy L5-S1, L5-S1 Interbody Arthrodesis, L4-S1 Segmental Instrumentation with Posterior Lateral Arthrodesis (N/A) 2 Days Post-Op    Plan:     During this hospitalization, patient to be seen daily to address the identified rehab impairments via therapeutic activities, therapeutic exercises, gait training and progress toward the following goals:    · Plan of Care Expires:  01/21/19    Subjective     Chief Complaint: None  Patient/Family Comments/goals: None voiced  Pain/Comfort:  ·        Objective:     Communicated with nurse prior to session.  Patient found all lines intact, call button in reach, bed alarm on and nurse notified    upon PT entry to room.     General Precautions: Standard, fall   Orthopedic Precautions:spinal precautions   Braces: LSO     Functional Mobility:  · Bed Mobility:     · Rolling Left:   independence  · Scooting: independence  · Supine to Sit: independence  · Sit to Supine: independence  · Transfers:     · Sit to Stand:  modified independence with no AD  · Gait:  ~130 ft with RW and SBA with LSO donned      AM-PAC 6 CLICK MOBILITY  Turning over in bed (including adjusting bedclothes, sheets and blankets)?: 4  Sitting down on and standing up from a chair with arms (e.g., wheelchair, bedside commode, etc.): 4  Moving from lying on back to sitting on the side of the bed?: 4  Moving to and from a bed to a chair (including a wheelchair)?: 4  Need to walk in hospital room?: 3  Climbing 3-5 steps with a railing?: 3  Basic Mobility Total Score: 22       Therapeutic Activities and Exercises:   Pt performed ambulation training ~130 ft with RW, SBA, and LSO donned. Declined to perform any therapeutic exercises.    Patient left supine with all lines intact, call button in reach, bed alarm on and nurse notified..    GOALS:   Multidisciplinary Problems     Physical Therapy Goals        Problem: Physical Therapy Goal    Goal Priority Disciplines Outcome Goal Variances Interventions   Physical Therapy Goal     PT, PT/OT Ongoing (interventions implemented as appropriate)     Description:  Goals to be met by: 2018     Patient will increase functional independence with mobility by performin. Supine to sit with Modified [place]  2. Sit to stand transfer with Modified [place]  3. Gait  x 150 feet with Modified [place] using Rolling Walker/or no AD.                        Time Tracking:     PT Received On: 18  PT Start Time: 1106     PT Stop Time: 1124  PT Total Time (min): 18 min     Billable Minutes: Gait Training   18    Treatment Type: Treatment  PT/PTA: PTA     PTA Visit Number: 1     Niecy Menjivar PTA  2018

## 2018-12-22 NOTE — DISCHARGE INSTRUCTIONS
Remain active with walking and other light activities daily.  No heavy lifting, no extreme bending or twisting.  Wear LSO brace when sitting or standing

## 2018-12-22 NOTE — PLAN OF CARE
[place] faxed/sent through [facility]  orders to [facility]  [phone], fax [phone] awaiting return call     DME: RW orders sent to [facility], [phone], fax [phone], pt wants RW delivered to his home, [address], [address], [place] [address] [phone]- Per on call Damián, RW will not be delivered till Wednesday, pt was fine with that    Future Appointments   Date Time Provider Department Center   1/8/2019  9:00 AM [facility]   1/8/2019  9:20 AM Wes Humphries PA-C [facility] NEUROSU [facility]   2/4/2019  3:15 PM [facility] ORTHO [facility]   2/4/2019  3:30 PM Ezequiel Pedroza MD [facility] NEUROSU [facility]          12/22/18 1326   Final Note   Assessment Type Final Discharge Note   Anticipated Discharge Disposition Home-Health  ([facility])   What phone number can be called within the next 1-3 days to see how you are doing after discharge? [phone]   Hospital Follow Up  Appt(s) scheduled? No  (Offices closed for Holiday Weekend.  Patient to schedule own follow up appointment.)   Right Care Referral Info   Post Acute Recommendation Home-care     Sonja Triana, RN Transitional Navigator  [phone]

## 2018-12-22 NOTE — PLAN OF CARE
Problem: Physical Therapy Goal  Goal: Physical Therapy Goal  Goals to be met by: 2018     Patient will increase functional independence with mobility by performin. Supine to sit with Modified [place]  2. Sit to stand transfer with Modified [place]  3. Gait  x 150 feet with Modified [place] using Rolling Walker/or no AD.      Outcome: Ongoing (interventions implemented as appropriate)      Pt continues to work toward goals.

## 2018-12-22 NOTE — NURSING
Pt resting quietly in bed after receiving pain medication.Dressing to incision C,D, I. Dressing over Hemovac insertion site reinforced. 200 ml blood drainage this shift. Pt requests that TEDS be removed. Will wear SCDs as ordered. Will continue to monitor pain control.

## 2018-12-23 NOTE — DISCHARGE SUMMARY
[facility]  Neurosurgery  Discharge Summary      Patient Name: Ezekiel Noyola  MRN: [id number]  Admission Date: 12/20/2018  Hospital Length of Stay: 2 days  Discharge Date and Time: 12/22/2018  1:52 PM  Attending Physician: No att. providers found   Discharging Provider: Ezequiel Pedroza MD  Primary Care Provider: Dionisio Avila MD     HPI: This is a 58 y.o. male history of L3 to S1 laminectomy and posterolateral instrumented fusion performed at [facility] in 2015. He developed worsening low back pain and bilateral legs pain with difficulty walking. Scoliosis films shows positive sagittal balance and a pelvic incidence/lumbar lordosis mismatch greater than 20 degrees. Ct scan of the lumbar spine shows that the right S1 screw was in the canal compressing the traversing S1 nerve root. He also has severe bilateral L5-S1 foramnal stenosis.        Procedure(s) (LRB):    1. Posterior open approach to the lumbar spine.  2. Removal of posterior instrumentation at L3-4, L4-5, L5-S1  3. Bilateral Fernando osteotomies at L5-S1 to improve lordosis at L5-S1  4. Left transforaminal interbody arthrodesis at L5-S1 using Vivigen cellular allograft and autograft taken from the same incision  5. Placement of an Globus Rise TLIF interbody cage  6. L4-5 and L5-S1 revision of posterolateral arthrodesis with autograft harvested from the same incision and Vivigen allograft  7. Segmental instrumentation at L4-5 and L5-S1  8. Neurophysiologic monitoring  9. Fluoroscopic localization.        Hospital Course: Surgery was uncomplicated and post-operative course uneventful. The patient was discharged home on post-op day 2 after mobilizing without assistance.     Consults: PT-OT    Significant Diagnostic Studies: NA    Pending Diagnostic Studies:     None        Final Active Diagnoses:    Diagnosis Date Noted POA    PRINCIPAL PROBLEM:  Spinal stenosis of lumbar region with radiculopathy [M48.061, M54.16] 12/20/2018 Yes      Problems  "Resolved During this Admission:      Discharged Condition: good    [place] [facility]    Follow Up:  Follow-up Information     Wes Humphries PA-C. Schedule an appointment as soon as possible for a visit in 2 weeks.    Specialty:  Neurosurgery  Why:  Offices closed for Holiday Weekend.  Patient to schedule own follow up appointment.  Contact information:  [address]  [address]  [place] [address]  [phone]             [facility].    Specialty:  Home Health Services  Why:  Home Health nurse will contact patient regarding a visit on Sunday, 12/23/18  Contact information:  [address]  [address]  [place] [address]  [phone]                 Patient Instructions:      WALKER FOR HOME USE     Order Specific Question Answer Comments   Type of Walker: Adult (5'4"-6'6")    With wheels? Yes    Height: 6' 2" (1.88 m)    Weight: 105 kg (231 lb 7.7 oz)    Length of need (1-99 months): 99    Does patient have medical equipment at home? raised toilet    Please check all that apply: Patient's condition impairs ambulation.      Medications:  Reconciled Home Medications:      Medication List      START taking these medications    cyclobenzaprine 10 MG tablet  Commonly known as:  FLEXERIL  Take 1 tablet (10 mg total) by mouth 3 (three) times daily as needed for Muscle spasms.        CONTINUE taking these medications    aspirin 81 MG Chew  Take 81 mg by mouth once daily.     olmesartan-hydrochlorothiazide 20-12.5 mg per tablet  Commonly known as:  BENICAR HCT  Take 1 tablet by mouth once daily.     oxyCODONE-acetaminophen  mg per tablet  Commonly known as:  PERCOCET  Take 1 tablet by mouth every 4 (four) hours as needed for Pain.        STOP taking these medications    indomethacin 50 MG capsule  Commonly known as:  INDOCIN            Ezequiel Pedroza MD  Neurosurgery  [facility]  "

## 2019-01-08 ENCOUNTER — HOSPITAL ENCOUNTER (OUTPATIENT)
Dept: RADIOLOGY | Facility: HOSPITAL | Age: 59
Discharge: HOME | End: 2019-01-08
Attending: NEUROLOGICAL SURGERY
Payer: MEDICARE

## 2019-01-08 ENCOUNTER — OFFICE VISIT (OUTPATIENT)
Dept: NEUROSURGERY | Facility: CLINIC | Age: 59
End: 2019-01-08
Payer: MEDICARE

## 2019-01-08 ENCOUNTER — TELEPHONE (OUTPATIENT)
Dept: ADMINISTRATIVE | Facility: CLINIC | Age: 59
End: 2019-01-08

## 2019-01-08 VITALS
DIASTOLIC BLOOD PRESSURE: 76 MMHG | SYSTOLIC BLOOD PRESSURE: 116 MMHG | HEIGHT: 74 IN | BODY MASS INDEX: 28.23 KG/M2 | WEIGHT: 220 LBS | HEART RATE: 97 BPM

## 2019-01-08 DIAGNOSIS — M51.37 DDD (DEGENERATIVE DISC DISEASE), LUMBOSACRAL: ICD-10-CM

## 2019-01-08 DIAGNOSIS — Z51.89 VISIT FOR WOUND CHECK: Primary | ICD-10-CM

## 2019-01-08 DIAGNOSIS — M48.061 SPINAL STENOSIS OF LUMBAR REGION, UNSPECIFIED WHETHER NEUROGENIC CLAUDICATION PRESENT: ICD-10-CM

## 2019-01-08 DIAGNOSIS — Z98.1 S/P LUMBAR FUSION: ICD-10-CM

## 2019-01-08 DIAGNOSIS — G89.4 CHRONIC PAIN SYNDROME: ICD-10-CM

## 2019-01-08 PROCEDURE — 99999 PR PBB SHADOW E&M-EST. PATIENT-LVL III: ICD-10-PCS | Mod: PBBFAC,,, | Performed by: PHYSICIAN ASSISTANT

## 2019-01-08 PROCEDURE — 99213 OFFICE O/P EST LOW 20 MIN: CPT | Mod: PBBFAC,25,PN | Performed by: PHYSICIAN ASSISTANT

## 2019-01-08 PROCEDURE — 72100 XR LUMBAR SPINE AP AND LATERAL: ICD-10-PCS | Mod: 26,,, | Performed by: RADIOLOGY

## 2019-01-08 PROCEDURE — 99024 POSTOP FOLLOW-UP VISIT: CPT | Mod: POP,,, | Performed by: PHYSICIAN ASSISTANT

## 2019-01-08 PROCEDURE — 72100 X-RAY EXAM L-S SPINE 2/3 VWS: CPT | Mod: 26,,, | Performed by: RADIOLOGY

## 2019-01-08 PROCEDURE — 99024 PR POST-OP FOLLOW-UP VISIT: ICD-10-PCS | Mod: POP,,, | Performed by: PHYSICIAN ASSISTANT

## 2019-01-08 PROCEDURE — 72100 X-RAY EXAM L-S SPINE 2/3 VWS: CPT | Mod: TC,PN

## 2019-01-08 PROCEDURE — 99999 PR PBB SHADOW E&M-EST. PATIENT-LVL III: CPT | Mod: PBBFAC,,, | Performed by: PHYSICIAN ASSISTANT

## 2019-01-08 RX ORDER — CYCLOBENZAPRINE HCL 10 MG
10 TABLET ORAL 3 TIMES DAILY PRN
Qty: 60 TABLET | Refills: 0 | Status: SHIPPED | OUTPATIENT
Start: 2019-01-08 | End: 2019-02-04 | Stop reason: SDUPTHER

## 2019-01-08 RX ORDER — CYCLOBENZAPRINE HCL 10 MG
10 TABLET ORAL 3 TIMES DAILY PRN
COMMUNITY
End: 2019-01-08 | Stop reason: SDUPTHER

## 2019-01-08 RX ORDER — OXYCODONE AND ACETAMINOPHEN 10; 325 MG/1; MG/1
1 TABLET ORAL EVERY 4 HOURS PRN
Qty: 90 TABLET | Refills: 0 | Status: SHIPPED | OUTPATIENT
Start: 2019-01-08 | End: 2019-02-04 | Stop reason: SDUPTHER

## 2019-01-08 NOTE — TELEPHONE ENCOUNTER
Home Health SOC 12/24/2018 - 02/21/2019 with [facility] - Dr. Ezequiel Pedroza. Patient received SN and PT services.

## 2019-01-08 NOTE — PROGRESS NOTES
Neurosurgery Wound Check Progress Note    HPI  Ezekiel Noyola is 58 y.o. male with a history of total right hip fusion in 1970s, chronic pain syndrome and narcotic dependence after previous L3 to S1 posterolateral fusion with instrumentation at [facility] in 2015, smokes THC for pain control, status post  bilateral L5-S1 Fernando osteotomies, L5-S1 TLIF and reinstrumentation from L4 to S1 with Dr. Chambers on secondary to worsening low back/leg pain and gait difficulty.  Imaging revealed pseudoarthrosis at L5-S1 with failure of instrumentation.right S1 screw was in the canal compressing the traversing S1 nerve root. He also has severe bilateral L5-S1 foramnal stenosis. Scoliosis films shows positive sagittal balance and a pelvic incidence/lumbar lordosis mismatch greater than 20 degrees.     Patient presents today for 2 weeks wound check and staple removal.  He reports of improvements and preoperative back pain.  Pain radiating down his right leg has completely resolved but he continues to have numbness of his entire right buttocks and hip.  He continues to have significant pain from left buttock radiating down into his feet that is a constant pain.  He also had a shocking pain down his left leg when he is walking, bending forward, or has increased activity.  He had this similar pain after surgery but feels like it is slightly improved since surgery.  He reports that since surgery he has no longer leaning forward to walk and that also helped with his previous neck pain.  He is taking Percocet 10 mg and Flexeril 10 mg p.r.n. pain and muscle spasm but feels like it only helps if he is lying still.  He is unable to take gabapentin or Lyrica secondary to its side effects.  He denies any bladder bowel incontinence or new/worsening leg weakness.  He denies any fever, chills, or incisional issues.  He is currently undergoing home health physical therapy walks with a rolling walker.  He reports that home a physical therapy is only  doing leg exercises with him.  He would like to improve to be part of his son's wedding in June 2019.          Low Back Pain Scale  R Low Back-Pain Score: 7  R Low Back-Pain Intensity: Pain killers give moderate relief from pain  R Low Back-Pain Score: It is painful to look after myself and I am slow and careful  Low Back-Lifting: I can only lift very light weights   Low Back-Walking: Pain prevents me walking more than .25 mile   Low Back-Sitting: Pain prevents me from sitting more than than 10 minutes   Low Back-Standing: I cannot stand for longer than 10 minutes with increasing pain   Low Back-Sleeping: Because of pain my normal nights sleep is reduced by less than one quarter   Low Back-Social Life: Pain has restricted my social life and I do not go out very often   Low Back-Changing Degree of Pain: My pain seems to be getting better but improvement is slow           EXAM  Vitals:    01/08/19 0910   BP: 116/76   Pulse: 97       General: well developed, well nourished.  Moderate distress secondary to pain.    Neurologic: Awake, alert and oriented x3. Thought content appropriate.  Head: normocephalic, atraumatic    GCS: Motor: 6/Verbal: 5/Eyes: 4 GCS Total: 15  Cranial nerves: face symmetric, tongue midline, pupils equal, round, reactive to light with accomodation, extraocular muscles intact. CN II-XII grossly intact.    Sensory: response to light touch throughout  Motor Strength: Moves all extremities spontaneously with good tone. Full strength upper extremities.  Left hip flexion 4+ out of 5 which is mostly pain limited.  All others 5/5.  Right hip is fused to his femur so limited right hip ROM.  Significant tenderness to palpation of left low back and paraspinal muscles, entire left buttocks, and SI joint.  Gait is slow and antalgic with rolling walker.     Heart: RRR, no cyanosis, pallor, or edema.    Lungs: normal respiratory effort  Abdomen: soft, non-tender and symmetric  Extremities: warm with no cyanosis,  edema, or clubbing  Skin: warm, dry and intact. No visible rashes or lesions.      Posterior lumbar Surgical incision is C/D/I without any signs of infection.  Incision is healing well.  No erythema, warmth, edema, TTP.  No drainage.  Wound edges are well approximated.    Staples intact and removed without difficulty.          IMAGING:  No imaging available for review during this appt with Dr. Pedroza in room.  X-ray lumbar AP lateral dated 01/08/2019 personally reviewed and compared to previous imaging.    Stable postoperative changes with hardware from L4-S1.  There is interbody at L5-S1.  No fractures of screws/rods or migration of hardware.        ASSESSMENT/PLAN    58 y.o. male with a history of total right hip fusion in 1970s, chronic pain syndrome and narcotic dependence after previous L3 to S1 posterolateral fusion with instrumentation at [facility] in 2015, smokes THC for pain control, status post  bilateral L5-S1 Fernando osteotomies, L5-S1 TLIF and reinstrumentation from L4 to S1 with Dr. Chambers on secondary to worsening low back/leg pain and gait difficulty.  Imaging revealed pseudoarthrosis at L5-S1 with failure of instrumentation.right S1 screw was in the canal compressing the traversing S1 nerve root. He also has severe bilateral L5-S1 foramnal stenosis. Scoliosis films shows positive sagittal balance and a pelvic incidence/lumbar lordosis mismatch greater than 20 degrees.     Patient presents today for 2 weeks wound check and staple removal. Patient has some improvements in his left leg pain and paresthesias since surgery but is still pretty significant and requiring pain meds.  Preoperative posture, low back pain and right radicular leg pain has resolved.  Incision is healing very well.  Patient has longstanding pain and this will be a long recovery process for him.  X-rays are stable.  We will obtain a CT of lumbar spine to evaluate hardware.  I will give him refill for his Percocet and Flexeril at this  time. I have reiterated wound care, activity restrictions, and follow up appts as below.     -Do not submerge incision for another 6 weeks. Pat the incision dry after your shower.  -Patient may stop using bacitracin ointment.  Patient to continue using bacitracin twice a day for another week.  -Keep incision open to air.  -LSO brace when OOB.    -Continue p.r.n. stool softener and miralax to prevent constipation with pain med use.   -Increase ambulation. No heavy lifting >10lbs.   No over bending or twisting at incisional site.  Fall precautions.  -Patient has follow up with Dr. Pedroza in 6 weeks with CT lumbar spine without contrast and lumbar xrays.     All questions were answered. Patient was encouraged to call clinic with any future concerns prior to follow up appt. I    Patient was seen with Dr. Pedroza    Please call with any questions.    Wes Humphries PA-C  Neurosurgery

## 2019-01-10 ENCOUNTER — TELEPHONE (OUTPATIENT)
Dept: NEUROSURGERY | Facility: CLINIC | Age: 59
End: 2019-01-10

## 2019-01-10 DIAGNOSIS — Z98.1 S/P LUMBAR FUSION: Primary | ICD-10-CM

## 2019-01-10 NOTE — TELEPHONE ENCOUNTER
----- Message from Kasandra Garcia sent at 1/10/2019  2:12 PM CST -----  Contact: Self [phone]  Patient is calling you to give you the number for physical therapy [phone] Attention Mrs. Silvestre.

## 2019-01-10 NOTE — TELEPHONE ENCOUNTER
Spoke to the patient and he is wondering if he needs to go to PT. If so can you write an external for him. Also he would like a letter to bring to his PCP stating the type of surgery he had along with the x-rays we recently did on him..

## 2019-01-10 NOTE — TELEPHONE ENCOUNTER
----- Message from Tasia Carlin sent at 1/10/2019 11:55 AM CST -----  Contact: Self/ [phone]  Patient called in requesting to speak with you. Patient prefers to speak with a nurse about his physical therapy.    Please call and advise.

## 2019-01-16 ENCOUNTER — HOSPITAL ENCOUNTER (OUTPATIENT)
Dept: RADIOLOGY | Facility: HOSPITAL | Age: 59
Discharge: HOME | End: 2019-01-16
Attending: PHYSICIAN ASSISTANT
Payer: MEDICARE

## 2019-01-16 DIAGNOSIS — G89.4 CHRONIC PAIN SYNDROME: ICD-10-CM

## 2019-01-16 DIAGNOSIS — M48.061 SPINAL STENOSIS OF LUMBAR REGION, UNSPECIFIED WHETHER NEUROGENIC CLAUDICATION PRESENT: ICD-10-CM

## 2019-01-16 DIAGNOSIS — M51.37 DDD (DEGENERATIVE DISC DISEASE), LUMBOSACRAL: ICD-10-CM

## 2019-01-16 PROCEDURE — 72131 CT LUMBAR SPINE W/O DYE: CPT | Mod: TC

## 2019-01-16 PROCEDURE — 72131 CT LUMBAR SPINE W/O DYE: CPT | Mod: 26,,, | Performed by: RADIOLOGY

## 2019-01-16 PROCEDURE — 72131 CT LUMBAR SPINE WITHOUT CONTRAST: ICD-10-PCS | Mod: 26,,, | Performed by: RADIOLOGY

## 2019-02-04 ENCOUNTER — HOSPITAL ENCOUNTER (OUTPATIENT)
Dept: RADIOLOGY | Facility: HOSPITAL | Age: 59
Discharge: HOME | End: 2019-02-04
Attending: NEUROLOGICAL SURGERY
Payer: MEDICARE

## 2019-02-04 ENCOUNTER — OFFICE VISIT (OUTPATIENT)
Dept: NEUROSURGERY | Facility: CLINIC | Age: 59
End: 2019-02-04
Payer: MEDICARE

## 2019-02-04 VITALS
WEIGHT: 220 LBS | HEART RATE: 104 BPM | SYSTOLIC BLOOD PRESSURE: 117 MMHG | BODY MASS INDEX: 28.25 KG/M2 | DIASTOLIC BLOOD PRESSURE: 82 MMHG

## 2019-02-04 DIAGNOSIS — Z98.1 S/P LUMBAR FUSION: ICD-10-CM

## 2019-02-04 DIAGNOSIS — M17.10 OSTEOARTHRITIS OF KNEE, UNILATERAL: ICD-10-CM

## 2019-02-04 DIAGNOSIS — M79.18 CERVICAL MYOFASCIAL PAIN SYNDROME: ICD-10-CM

## 2019-02-04 DIAGNOSIS — Z98.1 S/P LUMBAR FUSION: Primary | ICD-10-CM

## 2019-02-04 PROCEDURE — 72082 X-RAY EXAM ENTIRE SPI 2/3 VW: CPT | Mod: TC,PN

## 2019-02-04 PROCEDURE — 72082 XR SCOLIOSIS COMPLETE: ICD-10-PCS | Mod: 26,,, | Performed by: RADIOLOGY

## 2019-02-04 PROCEDURE — 99213 OFFICE O/P EST LOW 20 MIN: CPT | Mod: PBBFAC,25,PN | Performed by: NEUROLOGICAL SURGERY

## 2019-02-04 PROCEDURE — 99999 PR PBB SHADOW E&M-EST. PATIENT-LVL III: ICD-10-PCS | Mod: PBBFAC,,, | Performed by: NEUROLOGICAL SURGERY

## 2019-02-04 PROCEDURE — 99024 PR POST-OP FOLLOW-UP VISIT: ICD-10-PCS | Mod: POP,,, | Performed by: NEUROLOGICAL SURGERY

## 2019-02-04 PROCEDURE — 72082 X-RAY EXAM ENTIRE SPI 2/3 VW: CPT | Mod: 26,,, | Performed by: RADIOLOGY

## 2019-02-04 PROCEDURE — 72100 X-RAY EXAM L-S SPINE 2/3 VWS: CPT | Mod: 26,,, | Performed by: RADIOLOGY

## 2019-02-04 PROCEDURE — 72100 XR LUMBAR SPINE AP AND LATERAL: ICD-10-PCS | Mod: 26,,, | Performed by: RADIOLOGY

## 2019-02-04 PROCEDURE — 72100 X-RAY EXAM L-S SPINE 2/3 VWS: CPT | Mod: TC,PN

## 2019-02-04 PROCEDURE — 99024 POSTOP FOLLOW-UP VISIT: CPT | Mod: POP,,, | Performed by: NEUROLOGICAL SURGERY

## 2019-02-04 PROCEDURE — 99999 PR PBB SHADOW E&M-EST. PATIENT-LVL III: CPT | Mod: PBBFAC,,, | Performed by: NEUROLOGICAL SURGERY

## 2019-02-04 RX ORDER — CYCLOBENZAPRINE HCL 10 MG
10 TABLET ORAL 3 TIMES DAILY PRN
Qty: 60 TABLET | Refills: 0 | Status: SHIPPED | OUTPATIENT
Start: 2019-02-04 | End: 2019-07-22 | Stop reason: CLARIF

## 2019-02-04 RX ORDER — OXYCODONE AND ACETAMINOPHEN 10; 325 MG/1; MG/1
1 TABLET ORAL EVERY 4 HOURS PRN
Qty: 90 TABLET | Refills: 0 | Status: SHIPPED | OUTPATIENT
Start: 2019-02-04 | End: 2019-02-25 | Stop reason: SDUPTHER

## 2019-02-04 NOTE — PROGRESS NOTES
NEUROSURGICAL POST-OPERATIVE PROGRESS NOTE    DATE OF SERVICE:  02/04/2019      ATTENDING PHYSICIAN:  Ezequiel Pedroza MD    SUBJECTIVE:    INTERIM HISTORY:    This is a very pleasant 58 y.o. y.o. male, who is status post hardware revision with L4-S1 fusion.  Pt states that his back pain is improved following the surgery but he still has bilateral LE radiculopathy which is unchanged.  Today his back/legs are hurting more than usual bc of long bus ride to the hospital.  He is still ambulating and is wearing his brace.  He is doing water therapy.     Low Back Pain Scale  R Low Back-Pain Score: 7  R Low Back-Pain Intensity: Pain killers give very little relief from pain  R Low Back-Pain Score: I can look after myself normally but it causes extra pain  Low Back-Lifting: Pain prevents me from lifting heavy weights off the floor, but I can manage if they are conveniently positioned for example on a table   Low Back-Walking: Pain prevents me walking more than .25 mile   Low Back-Sitting: Pain prevents me from sitting more than than 10 minutes   Low Back-Standing: I cannot stand for longer than 10 minutes with increasing pain   Low Back-Sleeping: Because of pain my normal nights sleep is reduced by less than three quarters   Low Back-Social Life: Pain has restricted my social life and I do not go out very often   Low Back-Traveling: I have extra pain while traveling which compels me to seek alternate forms of travel   Low Back-Changing Degree of Pain: My pain seems to be getting better but improvement is slow         OBJECTIVE:    PHYSICAL EXAMINATION:       Back Exam     Muscle Strength   Right Quadriceps:  5/5   Left Quadriceps:  5/5   Right Hamstrings:  5/5   Left Hamstrings:  5/5             Neurologic Exam     Mental Status   Oriented to person, place, and time.   Speech: speech is normal   Level of consciousness: alert    Cranial Nerves   Cranial nerves II through XII intact.     Motor Exam   Muscle bulk:  normal  Overall muscle tone: normal    Strength   Right deltoid: 5/5  Left deltoid: 5/5  Right biceps: 5/5  Left biceps: 5/5  Right triceps: 5/5  Left triceps: 5/5  Right wrist flexion: 5/5  Left wrist flexion: 5/5  Right wrist extension: 5/5  Left wrist extension: 5/5  Right interossei: 5/5  Left interossei: 5/5  Right iliopsoas: 5/5  Left iliopsoas: 5/5  Right quadriceps: 5/5  Left quadriceps: 5/5  Right hamstrin/5  Left hamstrin/5  Right anterior tibial: 5/5  Left anterior tibial: 5/5  Right posterior tibial: 5/5  Left posterior tibial: 5/5  Right peroneal: 5/5  Left peroneal: 5/5  Right gastroc: 5/5  Left gastroc: 5/5    Sensory Exam   Light touch normal.   Pinprick normal.     Gait, Coordination, and Reflexes     Coordination   Finger to nose coordination: normal  Tandem walking coordination: normal    Reflexes   Right brachioradialis: 2+  Left brachioradialis: 2+  Right biceps: 2+  Left biceps: 2+  Right triceps: 2+  Left triceps: 2+  Right patellar: 2+  Left patellar: 2+  Right achilles: 2+  Left achilles: 2+  Right plantar: normal  Left plantar: normal  Right Schulz: absent  Left Schulz: absent  Right ankle clonus: absent  Left ankle clonus: absentGait antalgic dt back pain      Left SI joint pain on palpation  RYLAN test not possible on the left side since his left hip is fused  Right RYLAN test is positive    Wound has healed.    DIAGNOSTIC DATA:    X-ray of the lumbar spine, AP and lateral views reveals the fusion hardware is intact. No loosening of screws or migration of hardware.  CT L spine: stable placement of hardware from L4-S1 with L5/S1 interbody.  Scoliosis films pending    ASSESMENT:    This is a 58 y.o. male who is s/p hardware revision with L4-S1 fusion    Problem List Items Addressed This Visit     None      Visit Diagnoses     S/P lumbar fusion    -  Primary    Relevant Orders    X-Ray Scoliosis Complete Including Supine And Erect    Osteoarthritis of knee, unilateral         Relevant Orders    Ambulatory Referral to Orthopedics    Cervical myofascial pain syndrome        Relevant Orders    Ambulatory consult to Physical Therapy          PLAN:  Continue water/physical therapy, adding cervical PT  Referral in ortho for left knee pain  Refill pain medication  Continue brace while upright  Fu in 6 mo with repeat xrays.          Ezequiel Pedroza MD  Pager: [phone]

## 2019-02-25 RX ORDER — OXYCODONE AND ACETAMINOPHEN 10; 325 MG/1; MG/1
1 TABLET ORAL EVERY 8 HOURS PRN
Qty: 90 TABLET | Refills: 0 | Status: SHIPPED | OUTPATIENT
Start: 2019-02-25 | End: 2019-03-19 | Stop reason: SDUPTHER

## 2019-02-25 RX ORDER — OXYCODONE AND ACETAMINOPHEN 10; 325 MG/1; MG/1
1 TABLET ORAL EVERY 4 HOURS PRN
Qty: 90 TABLET | Refills: 0 | Status: SHIPPED | OUTPATIENT
Start: 2019-02-25 | End: 2019-02-25 | Stop reason: SDUPTHER

## 2019-03-18 ENCOUNTER — TELEPHONE (OUTPATIENT)
Dept: SPORTS MEDICINE | Facility: CLINIC | Age: 59
End: 2019-03-18

## 2019-03-18 NOTE — TELEPHONE ENCOUNTER
Telephoned patient to schedule visit with Dr. Simeon. Patient had transportation concerns and said that he would contact Dr. Dionisio Avila for another orthopedic referral.

## 2019-03-19 ENCOUNTER — OFFICE VISIT (OUTPATIENT)
Dept: NEUROSURGERY | Facility: CLINIC | Age: 59
End: 2019-03-19
Payer: MEDICARE

## 2019-03-19 ENCOUNTER — HOSPITAL ENCOUNTER (OUTPATIENT)
Dept: RADIOLOGY | Facility: HOSPITAL | Age: 59
Discharge: HOME | End: 2019-03-19
Attending: NEUROLOGICAL SURGERY
Payer: MEDICARE

## 2019-03-19 VITALS
WEIGHT: 220 LBS | SYSTOLIC BLOOD PRESSURE: 136 MMHG | DIASTOLIC BLOOD PRESSURE: 90 MMHG | HEART RATE: 81 BPM | BODY MASS INDEX: 28.23 KG/M2 | HEIGHT: 74 IN | TEMPERATURE: 99 F

## 2019-03-19 DIAGNOSIS — M53.2X8 INSTABILITY OF RIGHT SI JOINT: Primary | ICD-10-CM

## 2019-03-19 DIAGNOSIS — Z98.1 S/P LUMBAR FUSION: ICD-10-CM

## 2019-03-19 DIAGNOSIS — Z98.890 STATUS POST HIP SURGERY: ICD-10-CM

## 2019-03-19 DIAGNOSIS — Z98.1 STATUS POST LUMBAR SPINAL FUSION: ICD-10-CM

## 2019-03-19 PROCEDURE — 99213 OFFICE O/P EST LOW 20 MIN: CPT | Mod: 24,S$PBB,, | Performed by: NEUROLOGICAL SURGERY

## 2019-03-19 PROCEDURE — 99213 PR OFFICE/OUTPT VISIT, EST, LEVL III, 20-29 MIN: ICD-10-PCS | Mod: 24,S$PBB,, | Performed by: NEUROLOGICAL SURGERY

## 2019-03-19 PROCEDURE — 72100 X-RAY EXAM L-S SPINE 2/3 VWS: CPT | Mod: 26,,, | Performed by: RADIOLOGY

## 2019-03-19 PROCEDURE — 99213 OFFICE O/P EST LOW 20 MIN: CPT | Mod: PBBFAC,25,PN | Performed by: NEUROLOGICAL SURGERY

## 2019-03-19 PROCEDURE — 72100 XR LUMBAR SPINE AP AND LATERAL: ICD-10-PCS | Mod: 26,,, | Performed by: RADIOLOGY

## 2019-03-19 PROCEDURE — 99999 PR PBB SHADOW E&M-EST. PATIENT-LVL III: ICD-10-PCS | Mod: PBBFAC,,, | Performed by: NEUROLOGICAL SURGERY

## 2019-03-19 PROCEDURE — 72100 X-RAY EXAM L-S SPINE 2/3 VWS: CPT | Mod: TC,PN

## 2019-03-19 PROCEDURE — 99999 PR PBB SHADOW E&M-EST. PATIENT-LVL III: CPT | Mod: PBBFAC,,, | Performed by: NEUROLOGICAL SURGERY

## 2019-03-19 RX ORDER — OXYCODONE AND ACETAMINOPHEN 10; 325 MG/1; MG/1
1 TABLET ORAL EVERY 8 HOURS PRN
Qty: 90 TABLET | Refills: 0 | Status: SHIPPED | OUTPATIENT
Start: 2019-03-19 | End: 2019-04-18

## 2019-03-19 NOTE — PROGRESS NOTES
NEUROSURGICAL PROGRESS NOTE    DATE OF SERVICE:  03/19/2019    ATTENDING PHYSICIAN:  Ezequiel Pedroza MD    SUBJECTIVE:    INTERIM HISTORY:    This is a very pleasant 58 y.o. male, status post right hip arthrodesis, who is status post revision of L4 through S1 fusion due to pseudoarthrosis 3 months ago.  He underwent a L5-S1 interbody fusion and posterolateral fusion with hardware revision.  Since the surgery he admits that he is able to walk more upright.  However he still has pain center over the right upper buttock that radiates in the posterior thigh.  The right hip arthrodesis mix in difficult to sit for prolonged period of time or walk.  The right upper buttock pain is worse with standing, walking and sitting for prolonged period of time.  The pain is upper buttock is still 8 out of 10.  He has been doing physical therapy.  Has been complaining of left knee pain will have an orthopedic appointment soon.              PAST MEDICAL HISTORY:  Active Ambulatory Problems     Diagnosis Date Noted    Bursitis of hip 05/30/2014    DDD (degenerative disc disease), lumbosacral 05/30/2014    Lumbar spondylosis 05/30/2014    Lumbar radiculopathy 05/30/2014    History of back surgery 05/30/2014    Lumbar spinal stenosis 05/30/2014    Hx of surgical fusion joint 05/30/2014    Thoracic or lumbosacral neuritis or radiculitis, unspecified 06/20/2014    Positive cardiac stress test     Chest pain 08/23/2016    Dyspnea 08/23/2016    Spinal stenosis of lumbar region with radiculopathy 12/20/2018     Resolved Ambulatory Problems     Diagnosis Date Noted    No Resolved Ambulatory Problems     Past Medical History:   Diagnosis Date    Gout     Hepatitis C     Hypertension     Positive cardiac stress test     Positive D dimer        PAST SURGICAL HISTORY:  Past Surgical History:   Procedure Laterality Date    BACK SURGERY      FRACTURE SURGERY Left     leg    FUSION, SPINE, WITH INSTRUMENTATION Removal of spinal  hardware, Bilateral Jamison-White Osteotomy L5-S1, L5-S1 Interbody Arthrodesis, L4-S1 Segmental Instrumentation with Posterior Lateral Arthrodesis N/A 12/20/2018    [facility]    HIP SURGERY Right     hip fusion    INJECTION-STEROID-EPIDURAL-LUMBAR N/A 6/20/2014    Per[facility] Jr., [facility] at [facility] OR    KNEE ARTHROSCOPY      LAMINECTOMY      Lumbar    Myelogram N/A 11/5/2018    Performed by [facility]    right wrist surgery         SOCIAL HISTORY:   Social History     Socioeconomic History    Marital status:      Spouse name: Not on file    Number of children: Not on file    Years of education: Not on file    Highest education level: Not on file   Social Needs    Financial resource strain: Not on file    Food insecurity - worry: Not on file    Food insecurity - inability: Not on file    Transportation needs - medical: Not on file    Transportation needs - non-medical: Not on file   Occupational History    Not on file   Tobacco Use    Smoking status: Former Smoker   Substance and Sexual Activity    Alcohol use: No    Drug use: Yes     Types: Marijuana    Sexual activity: Not on file   Other Topics Concern    Not on file   Social History Narrative    Not on file       FAMILY HISTORY:  Family History   Problem Relation Age of Onset    Diabetes Sister     Hypertension Sister     Diabetes Brother     Hypertension Brother        CURRENTS MEDICATIONS:  Current Outpatient Medications on File Prior to Visit   Medication Sig Dispense Refill    aspirin 81 MG Chew Take 81 mg by mouth once daily.      olmesartan-hydrochlorothiazide (BENICAR HCT) 20-12.5 mg per tablet Take 1 tablet by mouth once daily.      [DISCONTINUED] oxyCODONE-acetaminophen (PERCOCET)  mg per tablet Take 1 tablet by mouth every 8 (eight) hours as needed for Pain. 90 tablet 0    cyclobenzaprine (FLEXERIL) 10 MG tablet Take 1 tablet (10  "mg total) by mouth 3 (three) times daily as needed for Muscle spasms. 60 tablet 0     No current facility-administered medications on file prior to visit.        ALLERGIES:  Review of patient's allergies indicates:   Allergen Reactions    Lactose     Gabapentin Itching     C/O med causing bump like rash on lower trunk    Penicillins Rash     Pt states a "a couple of years ago" he took penicillin and broke out in "red spots" with no itching or difficulty breathing       REVIEW OF SYSTEMS:  Review of Systems   Constitutional: Negative for diaphoresis, fever and weight loss.   Respiratory: Negative for shortness of breath.    Cardiovascular: Negative for chest pain.   Gastrointestinal: Negative for blood in stool.   Genitourinary: Negative for hematuria.   Endo/Heme/Allergies: Does not bruise/bleed easily.   All other systems reviewed and are negative.        OBJECTIVE:    PHYSICAL EXAMINATION:   Vitals:    03/19/19 1443   BP: (!) 136/90   Pulse: 81   Temp: 98.5 °F (36.9 °C)       Physical Exam:  Vitals reviewed.    Constitutional: He appears well-developed and well-nourished.     Eyes: Pupils are equal, round, and reactive to light. Conjunctivae and EOM are normal.     Cardiovascular: Normal distal pulses and no edema.     Abdominal: Soft.     Skin: Skin displays no rash on trunk and no rash on extremities. Skin displays no lesions on trunk and no lesions on extremities.     Psych/Behavior: He is alert. He is oriented to person, place, and time. He has a normal mood and affect.     Musculoskeletal:        Neck: Range of motion is full.     Neurological:        DTRs: Tricep reflexes are 2+ on the right side and 2+ on the left side. Bicep reflexes are 2+ on the right side and 2+ on the left side. Brachioradialis reflexes are 2+ on the right side and 2+ on the left side. Patellar reflexes are 2+ on the right side and 2+ on the left side. Achilles reflexes are 2+ on the right side and 2+ on the left side.       Back " Exam     Tenderness   The patient is experiencing tenderness in the sacroiliac (Right).    Range of Motion   Extension: abnormal   Flexion: abnormal   Lateral bend right: abnormal   Lateral bend left: abnormal   Rotation right: abnormal   Rotation left: abnormal     Muscle Strength   Right Quadriceps:  5/5   Left Quadriceps:  5/5   Right Hamstrings:  5/5   Left Hamstrings:  5/5     Tests   Straight leg raise right: negative  Straight leg raise left: negative            SI joint:   Palpation at the right SI joint is painful  SI joint dysfunction provocative maneuvers cannot be done because the right hip is fused    Neurologic Exam     Mental Status   Oriented to person, place, and time.   Speech: speech is normal   Level of consciousness: alert    Cranial Nerves   Cranial nerves II through XII intact.     CN III, IV, VI   Pupils are equal, round, and reactive to light.  Extraocular motions are normal.     Motor Exam   Muscle bulk: normal  Overall muscle tone: normal    Strength   Right deltoid: 5/5  Left deltoid: 5/5  Right biceps: 5/5  Left biceps: 5/5  Right triceps: 5/5  Left triceps: 5/5  Right wrist flexion: 5/5  Left wrist flexion: 5/5  Right wrist extension: 5/5  Left wrist extension: 5/5  Right interossei: 5/5  Left interossei: 5/5  Left iliopsoas: 5/5  Right quadriceps: 5/5  Left quadriceps: 5/5  Right hamstrin/5  Left hamstrin/5  Right anterior tibial: 5/5  Left anterior tibial: 5/5  Right posterior tibial: 5/5  Left posterior tibial: 5/5  Right peroneal: 5/5  Left peroneal: 5/5  Right gastroc: 5/5  Left gastroc: 5/5Right hip is fused     Sensory Exam   Light touch normal.   Pinprick normal.     Gait, Coordination, and Reflexes     Gait  Gait: normal    Coordination   Finger to nose coordination: normal  Tandem walking coordination: normal    Reflexes   Right brachioradialis: 2+  Left brachioradialis: 2+  Right biceps: 2+  Left biceps: 2+  Right triceps: 2+  Left triceps: 2+  Right patellar:  2+  Left patellar: 2+  Right achilles: 2+  Left achilles: 2+  Right plantar: normal  Left plantar: normal  Right Schulz: absent  Left Schulz: absent  Right ankle clonus: absent  Left ankle clonus: absent        DIAGNOSTIC DATA:  I personally interpreted the following imaging:   Lumbar x-ray today shows correct alignment, no loosening of hardware    ASSESMENT:  This is a 58 y.o. male with     Problem List Items Addressed This Visit     None      Visit Diagnoses     Instability of right SI joint    -  Primary    Status post lumbar spinal fusion        Status post hip surgery            Right hip arthrodesis    PLAN:  Right SI joint block and steroid injection for diagnosis and treatment of right SI joint dysfunction  Refill oxycodone 10/325 90 pills  Continue physical therapy      Ezequiel Pedroza MD  Cell:[phone]

## 2019-03-20 ENCOUNTER — TELEPHONE (OUTPATIENT)
Dept: NEUROSURGERY | Facility: CLINIC | Age: 59
End: 2019-03-20

## 2019-03-20 DIAGNOSIS — M53.3 SACROILIAC JOINT DYSFUNCTION OF RIGHT SIDE: Primary | ICD-10-CM

## 2019-03-20 NOTE — TELEPHONE ENCOUNTER
----- Message from Jermain Cassidy sent at 3/20/2019 10:39 AM CDT -----  Contact: self [phone]  Type:  Patient Returning Call    Who Called:Sam  Who Left Message for Patient:Sam  Does the patient know what this is regarding?:Somewhat  Would the patient rather a call back or a response via [facility]? Callback  Best Call Back Number:[phone]

## 2019-03-20 NOTE — TELEPHONE ENCOUNTER
----- Message from Noa Cunha sent at 3/20/2019  9:18 AM CDT -----  Contact: Self [phone]  Patient would like to speak with you about being able to come Friday at 11AM. Please advise

## 2019-03-22 ENCOUNTER — ANESTHESIA (OUTPATIENT)
Dept: SURGERY | Facility: HOSPITAL | Age: 59
End: 2019-03-22
Payer: MEDICARE

## 2019-03-22 ENCOUNTER — ANESTHESIA EVENT (OUTPATIENT)
Dept: SURGERY | Facility: HOSPITAL | Age: 59
End: 2019-03-22
Payer: MEDICARE

## 2019-03-22 ENCOUNTER — HOSPITAL ENCOUNTER (OUTPATIENT)
Facility: HOSPITAL | Age: 59
Discharge: HOME | End: 2019-03-22
Attending: NEUROLOGICAL SURGERY | Admitting: NEUROLOGICAL SURGERY
Payer: MEDICARE

## 2019-03-22 VITALS
HEIGHT: 74 IN | RESPIRATION RATE: 18 BRPM | HEART RATE: 85 BPM | TEMPERATURE: 98 F | OXYGEN SATURATION: 98 % | BODY MASS INDEX: 28.23 KG/M2 | SYSTOLIC BLOOD PRESSURE: 128 MMHG | DIASTOLIC BLOOD PRESSURE: 74 MMHG | WEIGHT: 220 LBS

## 2019-03-22 DIAGNOSIS — M53.3 SACROILIAC JOINT DYSFUNCTION OF RIGHT SIDE: ICD-10-CM

## 2019-03-22 LAB — POCT GLUCOSE: 121 MG/DL (ref 70–110)

## 2019-03-22 PROCEDURE — 63600175 PHARM REV CODE 636 W HCPCS: Performed by: NEUROLOGICAL SURGERY

## 2019-03-22 PROCEDURE — 25000003 PHARM REV CODE 250: Performed by: NURSE ANESTHETIST, CERTIFIED REGISTERED

## 2019-03-22 PROCEDURE — 71000015 HC POSTOP RECOV 1ST HR: Performed by: NEUROLOGICAL SURGERY

## 2019-03-22 PROCEDURE — 27096 PR INJECTION,SACROILIAC JOINT: ICD-10-PCS | Mod: RT,,, | Performed by: NEUROLOGICAL SURGERY

## 2019-03-22 PROCEDURE — 63600175 PHARM REV CODE 636 W HCPCS: Performed by: NURSE ANESTHETIST, CERTIFIED REGISTERED

## 2019-03-22 PROCEDURE — 36000704 HC OR TIME LEV I 1ST 15 MIN: Performed by: NEUROLOGICAL SURGERY

## 2019-03-22 PROCEDURE — 25000003 PHARM REV CODE 250: Performed by: NEUROLOGICAL SURGERY

## 2019-03-22 PROCEDURE — S0020 INJECTION, BUPIVICAINE HYDRO: HCPCS | Performed by: NEUROLOGICAL SURGERY

## 2019-03-22 PROCEDURE — 25500020 PHARM REV CODE 255: Performed by: NEUROLOGICAL SURGERY

## 2019-03-22 PROCEDURE — 37000009 HC ANESTHESIA EA ADD 15 MINS: Performed by: NEUROLOGICAL SURGERY

## 2019-03-22 PROCEDURE — 27096 INJECT SACROILIAC JOINT: CPT | Mod: RT,,, | Performed by: NEUROLOGICAL SURGERY

## 2019-03-22 PROCEDURE — 37000008 HC ANESTHESIA 1ST 15 MINUTES: Performed by: NEUROLOGICAL SURGERY

## 2019-03-22 PROCEDURE — 36000705 HC OR TIME LEV I EA ADD 15 MIN: Performed by: NEUROLOGICAL SURGERY

## 2019-03-22 RX ORDER — ONDANSETRON 2 MG/ML
4 INJECTION INTRAMUSCULAR; INTRAVENOUS DAILY PRN
Status: CANCELLED | OUTPATIENT
Start: 2019-03-22

## 2019-03-22 RX ORDER — HYDROMORPHONE HYDROCHLORIDE 2 MG/ML
0.5 INJECTION, SOLUTION INTRAMUSCULAR; INTRAVENOUS; SUBCUTANEOUS EVERY 5 MIN PRN
Status: CANCELLED | OUTPATIENT
Start: 2019-03-22

## 2019-03-22 RX ORDER — METHOCARBAMOL 750 MG/1
750 TABLET, FILM COATED ORAL EVERY 8 HOURS PRN
Status: DISCONTINUED | OUTPATIENT
Start: 2019-03-22 | End: 2019-03-22 | Stop reason: HOSPADM

## 2019-03-22 RX ORDER — ACETAMINOPHEN 325 MG/1
325 TABLET ORAL EVERY 6 HOURS PRN
Status: DISCONTINUED | OUTPATIENT
Start: 2019-03-22 | End: 2019-03-22 | Stop reason: HOSPADM

## 2019-03-22 RX ORDER — LIDOCAINE HYDROCHLORIDE 10 MG/ML
INJECTION INFILTRATION; PERINEURAL
Status: DISCONTINUED | OUTPATIENT
Start: 2019-03-22 | End: 2019-03-22 | Stop reason: HOSPADM

## 2019-03-22 RX ORDER — PROPOFOL 10 MG/ML
VIAL (ML) INTRAVENOUS CONTINUOUS PRN
Status: DISCONTINUED | OUTPATIENT
Start: 2019-03-22 | End: 2019-03-22

## 2019-03-22 RX ORDER — OXYCODONE HYDROCHLORIDE 5 MG/1
5 TABLET ORAL
Status: CANCELLED | OUTPATIENT
Start: 2019-03-22

## 2019-03-22 RX ORDER — LIDOCAINE HCL/PF 100 MG/5ML
SYRINGE (ML) INTRAVENOUS
Status: DISCONTINUED | OUTPATIENT
Start: 2019-03-22 | End: 2019-03-22

## 2019-03-22 RX ORDER — LIDOCAINE HYDROCHLORIDE 10 MG/ML
1 INJECTION, SOLUTION EPIDURAL; INFILTRATION; INTRACAUDAL; PERINEURAL ONCE
Status: DISCONTINUED | OUTPATIENT
Start: 2019-03-22 | End: 2019-03-22 | Stop reason: HOSPADM

## 2019-03-22 RX ORDER — PROPOFOL 10 MG/ML
VIAL (ML) INTRAVENOUS
Status: DISCONTINUED | OUTPATIENT
Start: 2019-03-22 | End: 2019-03-22

## 2019-03-22 RX ORDER — HYDROCODONE BITARTRATE AND ACETAMINOPHEN 10; 325 MG/1; MG/1
1 TABLET ORAL EVERY 4 HOURS PRN
Status: DISCONTINUED | OUTPATIENT
Start: 2019-03-22 | End: 2019-03-22 | Stop reason: HOSPADM

## 2019-03-22 RX ORDER — METHYLPREDNISOLONE ACETATE 40 MG/ML
INJECTION, SUSPENSION INTRA-ARTICULAR; INTRALESIONAL; INTRAMUSCULAR; SOFT TISSUE
Status: DISCONTINUED | OUTPATIENT
Start: 2019-03-22 | End: 2019-03-22 | Stop reason: HOSPADM

## 2019-03-22 RX ORDER — OXYCODONE AND ACETAMINOPHEN 5; 325 MG/1; MG/1
1 TABLET ORAL EVERY 4 HOURS PRN
Status: DISCONTINUED | OUTPATIENT
Start: 2019-03-22 | End: 2019-03-22 | Stop reason: HOSPADM

## 2019-03-22 RX ORDER — SODIUM CHLORIDE 9 MG/ML
INJECTION, SOLUTION INTRAVENOUS CONTINUOUS
Status: DISCONTINUED | OUTPATIENT
Start: 2019-03-22 | End: 2019-03-22 | Stop reason: HOSPADM

## 2019-03-22 RX ORDER — MUPIROCIN 20 MG/G
1 OINTMENT TOPICAL 2 TIMES DAILY
Status: DISCONTINUED | OUTPATIENT
Start: 2019-03-22 | End: 2019-03-22

## 2019-03-22 RX ORDER — BUPIVACAINE HYDROCHLORIDE 2.5 MG/ML
INJECTION, SOLUTION INFILTRATION; PERINEURAL
Status: DISCONTINUED | OUTPATIENT
Start: 2019-03-22 | End: 2019-03-22 | Stop reason: HOSPADM

## 2019-03-22 RX ORDER — SODIUM CHLORIDE, SODIUM LACTATE, POTASSIUM CHLORIDE, CALCIUM CHLORIDE 600; 310; 30; 20 MG/100ML; MG/100ML; MG/100ML; MG/100ML
INJECTION, SOLUTION INTRAVENOUS CONTINUOUS PRN
Status: DISCONTINUED | OUTPATIENT
Start: 2019-03-22 | End: 2019-03-22

## 2019-03-22 RX ADMIN — PROPOFOL 50 MG: 10 INJECTION, EMULSION INTRAVENOUS at 09:03

## 2019-03-22 RX ADMIN — SODIUM CHLORIDE, SODIUM LACTATE, POTASSIUM CHLORIDE, AND CALCIUM CHLORIDE: .6; .31; .03; .02 INJECTION, SOLUTION INTRAVENOUS at 08:03

## 2019-03-22 RX ADMIN — LIDOCAINE HYDROCHLORIDE 50 MG: 20 INJECTION, SOLUTION INTRAVENOUS at 09:03

## 2019-03-22 RX ADMIN — PROPOFOL 150 MCG/KG/MIN: 10 INJECTION, EMULSION INTRAVENOUS at 09:03

## 2019-03-22 RX ADMIN — PROPOFOL 20 MG: 10 INJECTION, EMULSION INTRAVENOUS at 09:03

## 2019-03-22 RX ADMIN — PROPOFOL 30 MG: 10 INJECTION, EMULSION INTRAVENOUS at 09:03

## 2019-03-22 RX ADMIN — PROPOFOL 30 MG: 10 INJECTION, EMULSION INTRAVENOUS at 10:03

## 2019-03-22 NOTE — DISCHARGE INSTRUCTIONS
Please fill out your pain journal and bring it with you to your next appointment with Dr. Pedroza      ANESTHESIA  -For the first 24 hours after surgery:  Do not drive, use heavy equipment, make important decisions, or drink alcohol  -It is normal to feel sleepy for several hours.  Rest until you are more awake.  -Have someone stay with you, if needed.  They can watch for problems and help keep you safe.  -Some possible post anesthesia side effects include: nausea and vomiting, sore throat and hoarseness, sleepiness, and dizziness.    PAIN  -If you have pain after surgery, pain medicine will help you feel better.  Take it as directed, before pain becomes severe.  Most pain relievers taken by mouth need at least 20-30 minutes to start working.  -Do not drive or drink alcohol while taking pain medicine.  -Pain medication can upset your stomach.  Taking them with a little food may help.  -Other ways to help control pain: elevation, ice, and relaxation  -Call your surgeon if still having unmanageable pain an hour after taking pain medicine.  -Pain medicine can cause constipation.  Taking an over-the counter stool softener while on prescription pain medicine and drinking plenty of fluids can prevent this side effect.  -Call your surgeon if you have severe side effects like: breathing problems, trouble waking up, dizziness, confusion, or severe constipation.    NAUSEA  -Some people have nausea after surgery.  This is often because of anesthesia, pain, pain medicine, or the stress of surgery.  -Do not push yourself to eat.  Start off with clear liquids and soup.  Slowly move to solid foods.  Don't eat fatty, rich, spicy foods at first.  Eat smaller amounts.  -If you develop persistent nausea and vomiting please notify your surgeon immediately.    BLEEDING  -Different types of surgery require different types of care and dressing changes.  It is important to follow all instructions and advice from your surgeon.  Change  dressing as directed.  Call your surgeon for any concerns regarding postop bleeding.    SIGNS OF INFECTION  -Signs of infection include: fever, swelling, drainage, and redness  -Notify your surgeon if you have a fever of 100.4 F (38.0 C) or higher.  -Notify your surgeon if you notice redness, swelling, increased pain, pus, or a foul smell at the incision site.        Discharge Instructions: After Your Surgery  Youve just had surgery. During surgery you were given medicine called anesthesia to keep you relaxed and free of pain. After surgery you may have some pain or nausea. This is common. Here are some tips for feeling better and getting well after surgery.     Stay on schedule with your medication.   Going home  Your doctor or nurse will show you how to take care of yourself when you go home. He or she will also answer your questions. Have an adult family member or friend drive you home. For the first 24 hours after your surgery:  · Do not drive or use heavy equipment.  · Do not make important decisions or sign legal papers.  · Do not drink alcohol.  · Have someone stay with you, if needed. He or she can watch for problems and help keep you safe.  Be sure to go to all follow-up visits with your doctor. And rest after your surgery for as long as your doctor tells you to.  Coping with pain  If you have pain after surgery, pain medicine will help you feel better. Take it as told, before pain becomes severe. Also, ask your doctor or pharmacist about other ways to control pain. This might be with heat, ice, or relaxation. And follow any other instructions your surgeon or nurse gives you.  Tips for taking pain medicine  To get the best relief possible, remember these points:  · Pain medicines can upset your stomach. Taking them with a little food may help.  · Most pain relievers taken by mouth need at least 20 to 30 minutes to start to work.  · Taking medicine on a schedule can help you remember to take it. Try to time  your medicine so that you can take it before starting an activity. This might be before you get dressed, go for a walk, or sit down for dinner.  · Constipation is a common side effect of pain medicines. Call your doctor before taking any medicines such as laxatives or stool softeners to help ease constipation. Also ask if you should skip any foods. Drinking lots of fluids and eating foods such as fruits and vegetables that are high in fiber can also help. Remember, do not take laxatives unless your surgeon has prescribed them.  · Drinking alcohol and taking pain medicine can cause dizziness and slow your breathing. It can even be deadly. Do not drink alcohol while taking pain medicine.  · Pain medicine can make you react more slowly to things. Do not drive or run machinery while taking pain medicine.  Your health care provider may tell you to take acetaminophen to help ease your pain. Ask him or her how much you are supposed to take each day. Acetaminophen or other pain relievers may interact with your prescription medicines or other over-the-counter (OTC) drugs. Some prescription medicines have acetaminophen and other ingredients. Using both prescription and OTC acetaminophen for pain can cause you to overdose. Read the labels on your OTC medicines with care. This will help you to clearly know the list of ingredients, how much to take, and any warnings. It may also help you not take too much acetaminophen. If you have questions or do not understand the information, ask your pharmacist or health care provider to explain it to you before you take the OTC medicine.  Managing nausea  Some people have an upset stomach after surgery. This is often because of anesthesia, pain, or pain medicine, or the stress of surgery. These tips will help you handle nausea and eat healthy foods as you get better. If you were on a special food plan before surgery, ask your doctor if you should follow it while you get better. These tips  may help:  · Do not push yourself to eat. Your body will tell you when to eat and how much.  · Start off with clear liquids and soup. They are easier to digest.  · Next try semi-solid foods, such as mashed potatoes, applesauce, and gelatin, as you feel ready.  · Slowly move to solid foods. Dont eat fatty, rich, or spicy foods at first.  · Do not force yourself to have 3 large meals a day. Instead eat smaller amounts more often.  · Take pain medicines with a small amount of solid food, such as crackers or toast, to avoid nausea.     Call your surgeon if  · You still have pain an hour after taking medicine. The medicine may not be strong enough.  · You feel too sleepy, dizzy, or groggy. The medicine may be too strong.  · You have side effects like nausea, vomiting, or skin changes, such as rash, itching, or hives.       If you have obstructive sleep apnea  You were given anesthesia medicine during surgery to keep you comfortable and free of pain. After surgery, you may have more apnea spells because of this medicine and other medicines you were given. The spells may last longer than usual.   At home:  · Keep using the continuous positive airway pressure (CPAP) device when you sleep. Unless your health care provider tells you not to, use it when you sleep, day or night. CPAP is a common device used to treat obstructive sleep apnea.  · Talk with your provider before taking any pain medicine, muscle relaxants, or sedatives. Your provider will tell you about the possible dangers of taking these medicines.  © 4237-5219 [employer]. [address] [place] [address], [place] [address]. All rights reserved. This information is not intended as a substitute for professional medical care. Always follow your healthcare professional's instructions.

## 2019-03-22 NOTE — PLAN OF CARE
Pt states pain 5/10 in Right hip @ this time. Pt states he is ready to be discharged home @ this time. PIV dc'd with tip intact. Dressing placed. Discharge instructions reviewed with patient and friend, with time allotted for questions. Pt and friend verbalize understanding of instructions and state they have no further questions @ this time. Pt handed pain diary and instructed to fill it out and bring it with him to his next appointment with Dr Pedroza. Pt verbalized understanding. Wheeled out to family's car.

## 2019-03-22 NOTE — PLAN OF CARE
Pt up to the bathroom with no difficulty. States he is ready to be discharged home @ this time. Cont to monitor.

## 2019-03-22 NOTE — ANESTHESIA PREPROCEDURE EVALUATION
03/22/2019  Ezekiel Noyola is a 58 y.o., male scheduled for SI Joint Injection    Past Medical History:   Diagnosis Date    Gout     Hepatitis C     Hypertension     Positive cardiac stress test     Positive D dimer      Past Surgical History:   Procedure Laterality Date    BACK SURGERY      FRACTURE SURGERY Left     leg    FUSION, SPINE, WITH INSTRUMENTATION Removal of spinal hardware, Bilateral Jamison-White Osteotomy L5-S1, L5-S1 Interbody Arthrodesis, L4-S1 Segmental Instrumentation with Posterior Lateral Arthrodesis N/A 12/20/2018    Performed by [facility]equiel [facility]za [facility] at [facility] OR    HIP SURGERY Right     hip fusion    INJECTION-STEROID-EPIDURAL-LUMBAR N/A 6/20/2014    Performed by Tyson Neely Jr., [facility] at [facility] OR    KNEE ARTHROSCOPY      LAMINECTOMY      Lumbar    Myelogram N/A 11/5/2018    [facility]    right wrist surgery         Pre-op Assessment    I have reviewed the Patient Summary Reports.     I have reviewed the Nursing Notes.   I have reviewed the Medications.     Review of Systems  Social:  Former Smoker, No Alcohol Use  Illicit Drug Use: Types of drugs include Marijuana,   Hematology/Oncology:  Hematology Normal        Cardiovascular:   Exercise tolerance: good Hypertension Denies Dysrhythmias.   Denies Angina.    Pulmonary:  Pulmonary Normal  Denies Shortness of breath.    Renal/:  Renal/ Normal     Hepatic/GI:   Hepatitis (Finished treatment 2 years ago ), C    Neurological:  Neurology Normal Denies TIA.  Denies CVA. Denies Seizures.    Endocrine:  Endocrine Normal        Physical Exam  General:  Well nourished               Coronary Angiogram 8/2016:  IMPRESSION:    No significant coronary artery disease.    Codominant coronary system supplying the PDA.        Anesthesia Plan  Type of Anesthesia, risks & benefits  discussed:  Anesthesia Type:  general  Patient's Preference:   Intra-op Monitoring Plan:   Intra-op Monitoring Plan Comments:   Post Op Pain Control Plan:   Post Op Pain Control Plan Comments:   Induction:   IV  Beta Blocker:  Patient is not currently on a Beta-Blocker (No further documentation required).       Informed Consent: Patient understands risks and agrees with Anesthesia plan.  Questions answered. Anesthesia consent signed with patient.  ASA Score: 2     Day of Surgery Review of History & Physical:        Anesthesia Plan Notes:           Ready For Surgery From Anesthesia Perspective.

## 2019-03-22 NOTE — ANESTHESIA POSTPROCEDURE EVALUATION
"Anesthesia Post Evaluation    Patient: Ezekiel Noyola    Procedure(s) Performed: Procedure(s) (LRB):  INJECTION, STEROID Right SI Joint Block and Steroid Injection (Right)    Final Anesthesia Type: MAC  Patient location during evaluation: [facility]  Patient participation: Yes- Able to Participate  Level of consciousness: awake and alert  Post-procedure vital signs: reviewed and stable  Pain management: adequate  Airway patency: patent  PONV status at discharge: No PONV  Anesthetic complications: no      Cardiovascular status: hemodynamically stable  Respiratory status: unassisted, spontaneous ventilation and room air  Hydration status: euvolemic  Follow-up not needed.        Visit Vitals  /76 (Patient Position: Lying)   Temp 36.8 °C (98.2 °F) (Temporal)   Resp 16   Ht 6' 2" (1.88 m)   Wt 99.8 kg (220 lb)   SpO2 98%   BMI 28.25 kg/m²       Pain/Jose A Score: No Data Recorded      "

## 2019-03-22 NOTE — H&P
NEUROSURGICAL PROGRESS NOTE     DATE OF SERVICE:  03/22/2019     ATTENDING PHYSICIAN:  Ezequiel Pedroza MD     SUBJECTIVE:     INTERIM HISTORY:     This is a very pleasant 58 y.o. male, status post right hip arthrodesis, who is status post revision of L4 through S1 fusion due to pseudoarthrosis 3 months ago.  He underwent a L5-S1 interbody fusion and posterolateral fusion with hardware revision.  Since the surgery he admits that he is able to walk more upright.  However he still has pain center over the right upper buttock that radiates in the posterior thigh.  The right hip arthrodesis mix in difficult to sit for prolonged period of time or walk.  The right upper buttock pain is worse with standing, walking and sitting for prolonged period of time.  The pain is upper buttock is still 8 out of 10.  He has been doing physical therapy.  Has been complaining of left knee pain will have an orthopedic appointment soon.           PAST MEDICAL HISTORY:       Active Ambulatory Problems     Diagnosis Date Noted    Bursitis of hip 05/30/2014    DDD (degenerative disc disease), lumbosacral 05/30/2014    Lumbar spondylosis 05/30/2014    Lumbar radiculopathy 05/30/2014    History of back surgery 05/30/2014    Lumbar spinal stenosis 05/30/2014    Hx of surgical fusion joint 05/30/2014    Thoracic or lumbosacral neuritis or radiculitis, unspecified 06/20/2014    Positive cardiac stress test      Chest pain 08/23/2016    Dyspnea 08/23/2016    Spinal stenosis of lumbar region with radiculopathy 12/20/2018           Resolved Ambulatory Problems     Diagnosis Date Noted    No Resolved Ambulatory Problems           Past Medical History:   Diagnosis Date    Gout      Hepatitis C      Hypertension      Positive cardiac stress test      Positive D dimer           PAST SURGICAL HISTORY:        Past Surgical History:   Procedure Laterality Date    BACK SURGERY        FRACTURE SURGERY Left       leg    FUSION, SPINE, WITH  INSTRUMENTATION Removal of spinal hardware, Bilateral Jamison-White Osteotomy L5-S1, L5-S1 Interbody Arthrodesis, L4-S1 Segmental Instrumentation with Posterior Lateral Arthrodesis N/A 12/20/2018     Performed by [facility] at [facility] OR    HIP SURGERY Right       hip fusion    INJECTION-STEROID-EPIDURAL-LUMBAR N/A 6/20/2014     Per[facility] by [facility] OR    KNEE ARTHROSCOPY        LAMINECTOMY         Lumbar    Myelogram N/A 11/5/2018     Performed by [facility]    right wrist surgery             SOCIAL HISTORY:   Social History               Socioeconomic History    Marital status:        Spouse name: Not on file    Number of children: Not on file    Years of education: Not on file    Highest education level: Not on file   Social Needs    Financial resource strain: Not on file    Food insecurity - worry: Not on file    Food insecurity - inability: Not on file    Transportation needs - medical: Not on file    Transportation needs - non-medical: Not on file   Occupational History    Not on file   Tobacco Use    Smoking status: Former Smoker   Substance and Sexual Activity    Alcohol use: No    Drug use: Yes       Types: Marijuana    Sexual activity: Not on file   Other Topics Concern    Not on file   Social History Narrative    Not on file            FAMILY HISTORY:        Family History   Problem Relation Age of Onset    Diabetes Sister      Hypertension Sister      Diabetes Brother      Hypertension Brother           CURRENTS MEDICATIONS:         Current Outpatient Medications on File Prior to Visit   Medication Sig Dispense Refill    aspirin 81 MG Chew Take 81 mg by mouth once daily.        olmesartan-hydrochlorothiazide (BENICAR HCT) 20-12.5 mg per tablet Take 1 tablet by mouth once daily.        [DISCONTINUED] oxyCODONE-acetaminophen (PERCOCET)  mg per tablet Take 1 tablet by mouth every 8 (eight) hours as  "needed for Pain. 90 tablet 0    cyclobenzaprine (FLEXERIL) 10 MG tablet Take 1 tablet (10 mg total) by mouth 3 (three) times daily as needed for Muscle spasms. 60 tablet 0      No current facility-administered medications on file prior to visit.          ALLERGIES:        Review of patient's allergies indicates:   Allergen Reactions    Lactose      Gabapentin Itching       C/O med causing bump like rash on lower trunk    Penicillins Rash       Pt states a "a couple of years ago" he took penicillin and broke out in "red spots" with no itching or difficulty breathing         REVIEW OF SYSTEMS:  Review of Systems   Constitutional: Negative for diaphoresis, fever and weight loss.   Respiratory: Negative for shortness of breath.    Cardiovascular: Negative for chest pain.   Gastrointestinal: Negative for blood in stool.   Genitourinary: Negative for hematuria.   Endo/Heme/Allergies: Does not bruise/bleed easily.   All other systems reviewed and are negative.           OBJECTIVE:     PHYSICAL EXAMINATION:   Vitals:     03/19/19 1443   BP: (!) 136/90   Pulse: 81   Temp: 98.5 °F (36.9 °C)         Physical Exam:  Vitals reviewed.     Constitutional: He appears well-developed and well-nourished.      Eyes: Pupils are equal, round, and reactive to light. Conjunctivae and EOM are normal.      Cardiovascular: Normal distal pulses and no edema.      Abdominal: Soft.      Skin: Skin displays no rash on trunk and no rash on extremities. Skin displays no lesions on trunk and no lesions on extremities.     Psych/Behavior: He is alert. He is oriented to person, place, and time. He has a normal mood and affect.     Musculoskeletal:        Neck: Range of motion is full.     Neurological:        DTRs: Tricep reflexes are 2+ on the right side and 2+ on the left side. Bicep reflexes are 2+ on the right side and 2+ on the left side. Brachioradialis reflexes are 2+ on the right side and 2+ on the left side. Patellar reflexes are 2+ on " the right side and 2+ on the left side. Achilles reflexes are 2+ on the right side and 2+ on the left side.         Back Exam      Tenderness   The patient is experiencing tenderness in the sacroiliac (Right).     Range of Motion   Extension: abnormal   Flexion: abnormal   Lateral bend right: abnormal   Lateral bend left: abnormal   Rotation right: abnormal   Rotation left: abnormal      Muscle Strength   Right Quadriceps:  5/5   Left Quadriceps:  5/5   Right Hamstrings:  5/5   Left Hamstrings:  5/5      Tests   Straight leg raise right: negative  Straight leg raise left: negative                 SI joint:   Palpation at the right SI joint is painful  SI joint dysfunction provocative maneuvers cannot be done because the right hip is fused     Neurologic Exam      Mental Status   Oriented to person, place, and time.   Speech: speech is normal   Level of consciousness: alert     Cranial Nerves   Cranial nerves II through XII intact.      CN III, IV, VI   Pupils are equal, round, and reactive to light.  Extraocular motions are normal.      Motor Exam   Muscle bulk: normal  Overall muscle tone: normal     Strength   Right deltoid: 5/5  Left deltoid: 5/5  Right biceps: 5/5  Left biceps: 5/5  Right triceps: 5/5  Left triceps: 5/5  Right wrist flexion: 5/5  Left wrist flexion: 5/5  Right wrist extension: 5/5  Left wrist extension: 5/5  Right interossei: 5/5  Left interossei: 5/5  Left iliopsoas: 5/5  Right quadriceps: 5/5  Left quadriceps: 5/5  Right hamstrin/5  Left hamstrin/5  Right anterior tibial: 5/5  Left anterior tibial: 5/5  Right posterior tibial: 5/5  Left posterior tibial: 5/5  Right peroneal: 5/5  Left peroneal: 5/5  Right gastroc: 5/5  Left gastroc: 5/5Right hip is fused      Sensory Exam   Light touch normal.   Pinprick normal.      Gait, Coordination, and Reflexes      Gait  Gait: normal     Coordination   Finger to nose coordination: normal  Tandem walking coordination: normal     Reflexes   Right  brachioradialis: 2+  Left brachioradialis: 2+  Right biceps: 2+  Left biceps: 2+  Right triceps: 2+  Left triceps: 2+  Right patellar: 2+  Left patellar: 2+  Right achilles: 2+  Left achilles: 2+  Right plantar: normal  Left plantar: normal  Right Schulz: absent  Left Schulz: absent  Right ankle clonus: absent  Left ankle clonus: absent           DIAGNOSTIC DATA:  I personally interpreted the following imaging:   Lumbar x-ray today shows correct alignment, no loosening of hardware     ASSESMENT:  This is a 58 y.o. male with          Problem List Items Addressed This Visit      None               Visit Diagnoses      Instability of right SI joint    -  Primary     Status post lumbar spinal fusion         Status post hip surgery              Right hip arthrodesis     PLAN:  Right SI joint block and steroid injection for diagnosis and treatment of right SI joint dysfunction    Continue physical therapy       Ezequiel Pedroza MD  Cell:[phone]

## 2019-03-22 NOTE — TRANSFER OF CARE
"Anesthesia Transfer of Care Note    Patient: Ezekiel Noyola    Procedure(s) Performed: Procedure(s) (LRB):  INJECTION, STEROID Right SI Joint Block and Steroid Injection (Right)    Patient location: [facility]    Anesthesia Type: MAC    Transport from OR: Transported from OR on room air with adequate spontaneous ventilation    Post pain: adequate analgesia    Post assessment: no apparent anesthetic complications and tolerated procedure well    Post vital signs: stable    Level of consciousness: awake and alert    Nausea/Vomiting: no nausea/vomiting    Complications: none    Transfer of care protocol was followed      Last vitals:   Visit Vitals  /76 (Patient Position: Lying)   Temp 36.8 °C (98.2 °F) (Temporal)   Resp 16   Ht 6' 2" (1.88 m)   Wt 99.8 kg (220 lb)   SpO2 98%   BMI 28.25 kg/m²     "

## 2019-03-22 NOTE — OP NOTE
DATE OF PROCEDURE: 03/22/2019     PREOPERATIVE DIAGNOSES:  1. Right sacroiliac joint dysfunction and refractory pain  2. S/P Lumbosacral fusion     POSTOPERATIVE DIAGNOSES:   1. Right sacroiliac joint dysfunction and refractory pain  2. S/P Lumbosacral fusion     NAME OF OPERATION:  1.  Right sacroiliac joint block with 4 cc of Marcaine .25%   2. Fluoroscopy     PRIMARY SURGEON: Ezequiel Pedroza M.D.     ASSISTANT SURGEON: KENDRICK        INDICATION FOR PROCEDURE: This is a 58-year-old male who presented with severe right refractory SI joint pain that was refractory to conservative treatment.   Risks, benefits, alternative and limitation were discussed with the patient. The risk included nerve root injury that can cause paralysis, cerebrospinal fluid leak, wound infection and blood loss. The patient wanted to proceed and a consent was obtained.      DESCRIPTION OF THE PROCEDURE     The patient was placed on MAC anesthesia and was prone on the Rayray table.  All pressure points were carefully padded. The patient was prepped and draped   in the typical sterile fashion and the incision was made with a #15 blade.   Hemostasis was complete and the gluteal fascia was penetrated with a k-wire. Using the lateral and outlet views, and after local anesthesia with 1% lidocaine, the 22 spinal shahla needle was inserted inside the right sacroiliac joint and 1cc of Omnipaque was injected to confirm the needle tip placement.  We then injected 4 cc of 0.25% marcaine mixed with 40 mg of Depo-Medrol. The wounds were dressed with a sterile dressing.   The blood loss was less than 1 cc. The final count was completed and nothing was missing. There was no complication.

## 2019-03-22 NOTE — PLAN OF CARE
Tolerating liquids with no difficulty. States pain 5/10 @ this time. Friend remains @ bs. Cont to monitor.

## 2019-03-27 NOTE — DISCHARGE SUMMARY
[facility]  Neurosurgery  Discharge Summary      Patient Name: Ezekiel Noyola  MRN: [id number]  Admission Date: 3/22/2019  Hospital Length of Stay: 0 days  Discharge Date and Time: 3/22/2019 10:59 AM  Attending Physician: No att. providers found   Discharging Provider: Ezequiel Pedroza MD  Primary Care Provider: Dionisio Avila MD     HPI: Patient with lumbosacral fusion and right hip arthrodesis who developed right SI joint dysfunction and refractory pain.     Procedure(s) (LRB):  INJECTION, STEROID Right SI Joint Block and Steroid Injection (Right)     Hospital Course: Procedure was uncomplicated.    Consults: NA    Significant Diagnostic Studies: NA    Pending Diagnostic Studies:     None        Final Active Diagnoses:    Diagnosis Date Noted POA    Sacroiliac joint dysfunction of right side [M53.3] 03/22/2019 Yes      Problems Resolved During this Admission:      Discharged Condition: good    Disposition: Home or Self Care    Follow Up:    Patient Instructions:      Diet general     Medications:  Reconciled Home Medications:      Medication List      CONTINUE taking these medications    aspirin 81 MG Chew  Take 81 mg by mouth once daily.     cyclobenzaprine 10 MG tablet  Commonly known as:  FLEXERIL  Take 1 tablet (10 mg total) by mouth 3 (three) times daily as needed for Muscle spasms.     olmesartan-hydrochlorothiazide 20-12.5 mg per tablet  Commonly known as:  BENICAR HCT  Take 1 tablet by mouth once daily.     oxyCODONE-acetaminophen  mg per tablet  Commonly known as:  PERCOCET  Take 1 tablet by mouth every 8 (eight) hours as needed for Pain.            Ezequiel Pedroza MD  Neurosurgery  [facility]

## 2019-04-03 ENCOUNTER — TELEPHONE (OUTPATIENT)
Dept: NEUROSURGERY | Facility: CLINIC | Age: 59
End: 2019-04-03

## 2019-04-03 NOTE — TELEPHONE ENCOUNTER
----- Message from Quincy Vasquez sent at 4/3/2019  8:14 AM CDT -----  Contact: [phone]/self  Patient states since the procedure his pain level is worse.  Please call and advise

## 2019-04-03 NOTE — TELEPHONE ENCOUNTER
Spoke with Mr. Noyola, he states he's been having severe pain in his right hip down to his leg. This been ongoing since Saturday- 03/29/19. He's having difficulty walking from the pain and is taking Percocet 10-325mg - he states isn't working. Pain is a 10 - per patient.     appt offered with Wes for today- patient refused, stating he just wanted Dr. Pedroza to be aware of the pain he's having and will keep follow up appt scheduled 04/16.    Recommendations for patient?

## 2019-04-03 NOTE — TELEPHONE ENCOUNTER
"Spoke to patient, he states " First day my pain was painful after the block, then it started coming and going. The next day, it was just painful until I took my pain meds. It is now bad when I get out my bed its just too painful. "    Mr. Noyola stated he's okay, as long as he doesn't get out of bed, too painful with movement.    Please advise.   "

## 2019-04-08 ENCOUNTER — HOSPITAL ENCOUNTER (OUTPATIENT)
Dept: RADIOLOGY | Facility: OTHER | Age: 59
Discharge: HOME | End: 2019-04-08
Attending: ORTHOPAEDIC SURGERY
Payer: MEDICARE

## 2019-04-08 DIAGNOSIS — M25.561 RIGHT KNEE PAIN: ICD-10-CM

## 2019-04-08 DIAGNOSIS — M25.512 LEFT SHOULDER PAIN: ICD-10-CM

## 2019-04-08 DIAGNOSIS — M25.562 LEFT KNEE PAIN: Primary | ICD-10-CM

## 2019-04-08 DIAGNOSIS — M25.562 LEFT KNEE PAIN: ICD-10-CM

## 2019-04-08 PROCEDURE — 73560 X-RAY EXAM OF KNEE 1 OR 2: CPT | Mod: 50,TC,FY

## 2019-04-08 PROCEDURE — 73560 XR KNEE 1 OR 2 VIEW BILATERAL: ICD-10-PCS | Mod: 26,50,, | Performed by: RADIOLOGY

## 2019-04-08 PROCEDURE — 73030 X-RAY EXAM OF SHOULDER: CPT | Mod: 26,LT,, | Performed by: RADIOLOGY

## 2019-04-08 PROCEDURE — 73030 XR SHOULDER COMPLETE 2 OR MORE VIEWS LEFT: ICD-10-PCS | Mod: 26,LT,, | Performed by: RADIOLOGY

## 2019-04-08 PROCEDURE — 73030 X-RAY EXAM OF SHOULDER: CPT | Mod: TC,FY,LT

## 2019-04-08 PROCEDURE — 73560 X-RAY EXAM OF KNEE 1 OR 2: CPT | Mod: 26,50,, | Performed by: RADIOLOGY

## 2019-04-16 ENCOUNTER — OFFICE VISIT (OUTPATIENT)
Dept: NEUROSURGERY | Facility: CLINIC | Age: 59
End: 2019-04-16
Payer: MEDICARE

## 2019-04-16 VITALS
SYSTOLIC BLOOD PRESSURE: 129 MMHG | DIASTOLIC BLOOD PRESSURE: 83 MMHG | HEIGHT: 74 IN | HEART RATE: 84 BPM | WEIGHT: 225.75 LBS | BODY MASS INDEX: 28.97 KG/M2

## 2019-04-16 DIAGNOSIS — M53.3 SACROILIAC JOINT DYSFUNCTION OF RIGHT SIDE: Primary | ICD-10-CM

## 2019-04-16 PROCEDURE — 99999 PR PBB SHADOW E&M-EST. PATIENT-LVL III: CPT | Mod: PBBFAC,,, | Performed by: NEUROLOGICAL SURGERY

## 2019-04-16 PROCEDURE — 99212 OFFICE O/P EST SF 10 MIN: CPT | Mod: S$PBB,,, | Performed by: NEUROLOGICAL SURGERY

## 2019-04-16 PROCEDURE — 99213 OFFICE O/P EST LOW 20 MIN: CPT | Mod: PBBFAC,PN | Performed by: NEUROLOGICAL SURGERY

## 2019-04-16 PROCEDURE — 99999 PR PBB SHADOW E&M-EST. PATIENT-LVL III: ICD-10-PCS | Mod: PBBFAC,,, | Performed by: NEUROLOGICAL SURGERY

## 2019-04-16 PROCEDURE — 99212 PR OFFICE/OUTPT VISIT, EST, LEVL II, 10-19 MIN: ICD-10-PCS | Mod: S$PBB,,, | Performed by: NEUROLOGICAL SURGERY

## 2019-04-16 NOTE — PROGRESS NOTES
NEUROSURGICAL PROGRESS NOTE    DATE OF SERVICE:  04/16/2019    ATTENDING PHYSICIAN:  Ezequiel Pedroza MD    SUBJECTIVE:    INTERIM HISTORY:    This is a very pleasant 58 y.o. male, who is status post right SI joint block and steroid injection about three weeks ago. Reports about 50% pain relief for the duration of the block but reports worsening right back and buttock pain the day after for about one week. He has been using his low back brace but did not receive a SI joint belt yet. He has completed PT for his SI joint dysfunction. He takes narcotics everyday.   His right hip his fused.     Low Back Pain Scale  R Low Back-Pain Score: 7  R Low Back-Pain Intensity: Pain killers give moderate relief from pain  R Low Back-Pain Score: I can look after myself normally but it causes extra pain  Low Back-Lifting: Pain prevents me from lifting heavy weights off the floor, but I can manage if they are conveniently positioned for example on a table   Low Back-Walking: Pain prevents me walking more than .25 mile   Low Back-Sitting: Pain prevents me from sitting more than than 10 minutes   Low Back-Standing: I cannot stand for longer than 10 minutes with increasing pain   Low Back-Sleeping: Because of pain my normal nights sleep is reduced by less than one quarter   Low Back-Social Life: I have hardly any social life because of the pain   Low Back-Traveling: Pain restricts all forms of travel   Low Back-Changing Degree of Pain: My pain is gradually worsening         PAST MEDICAL HISTORY:  Active Ambulatory Problems     Diagnosis Date Noted    Bursitis of hip 05/30/2014    DDD (degenerative disc disease), lumbosacral 05/30/2014    Lumbar spondylosis 05/30/2014    Lumbar radiculopathy 05/30/2014    History of back surgery 05/30/2014    Lumbar spinal stenosis 05/30/2014    Hx of surgical fusion joint 05/30/2014    Thoracic or lumbosacral neuritis or radiculitis, unspecified 06/20/2014    Positive cardiac stress test      Chest pain 08/23/2016    Dyspnea 08/23/2016    Spinal stenosis of lumbar region with radiculopathy 12/20/2018    Sacroiliac joint dysfunction of right side 03/22/2019     Resolved Ambulatory Problems     Diagnosis Date Noted    No Resolved Ambulatory Problems     Past Medical History:   Diagnosis Date    Gout     Hepatitis C     Hypertension     Positive cardiac stress test     Positive D dimer        PAST SURGICAL HISTORY:  Past Surgical History:   Procedure Laterality Date    BACK SURGERY      FRACTURE SURGERY Left     leg    FUSION, SPINE, WITH INSTRUMENTATION Removal of spinal hardware, Bilateral Jamison-White Osteotomy L5-S1, L5-S1 Interbody Arthrodesis, L4-S1 Segmental Instrumentation with Posterior Lateral Arthrodesis N/A 12/20/2018    Per[facility] at [facility] OR    HIP SURGERY Right     hip fusion    INJECTION, STEROID Right SI Joint Block and Steroid Injection Right 3/22/2019    [facility] at [facility] OR    INJECTION-STEROID-EPIDURAL-LUMBAR N/A 6/20/2014    [facility]    KNEE ARTHROSCOPY      LAMINECTOMY      Lumbar    Myelogram N/A 11/5/2018    [facility] by [facility] OR 2ND [facility]    right wrist surgery         SOCIAL HISTORY:   Social History     Socioeconomic History    Marital status:      Spouse name: Not on file    Number of children: Not on file    Years of education: Not on file    Highest education level: Not on file   Occupational History    Not on file   Social Needs    Financial resource strain: Not on file    Food insecurity:     Worry: Not on file     Inability: Not on file    Transportation needs:     Medical: Not on file     Non-medical: Not on file   Tobacco Use    Smoking status: Former Smoker   Substance and Sexual Activity    Alcohol use: No    Drug use: Yes     Types: Marijuana    Sexual activity: Not on file   Lifestyle    Physical activity:     Days per  "week: Not on file     Minutes per session: Not on file    Stress: Not on file   Relationships    Social connections:     Talks on phone: Not on file     Gets together: Not on file     Attends Yazidi service: Not on file     Active member of club or organization: Not on file     Attends meetings of clubs or organizations: Not on file     Relationship status: Not on file   Other Topics Concern    Not on file   Social History Narrative    Not on file       FAMILY HISTORY:  Family History   Problem Relation Age of Onset    Diabetes Sister     Hypertension Sister     Diabetes Brother     Hypertension Brother        CURRENTS MEDICATIONS:  Current Outpatient Medications on File Prior to Visit   Medication Sig Dispense Refill    aspirin 81 MG Chew Take 81 mg by mouth once daily.      olmesartan-hydrochlorothiazide (BENICAR HCT) 20-12.5 mg per tablet Take 1 tablet by mouth once daily.      oxyCODONE-acetaminophen (PERCOCET)  mg per tablet Take 1 tablet by mouth every 8 (eight) hours as needed for Pain. 90 tablet 0    cyclobenzaprine (FLEXERIL) 10 MG tablet Take 1 tablet (10 mg total) by mouth 3 (three) times daily as needed for Muscle spasms. 60 tablet 0     No current facility-administered medications on file prior to visit.        ALLERGIES:  Review of patient's allergies indicates:   Allergen Reactions    Lactose     Gabapentin Itching     C/O med causing bump like rash on lower trunk    Penicillins Rash     Pt states a "a couple of years ago" he took penicillin and broke out in "red spots" with no itching or difficulty breathing       REVIEW OF SYSTEMS:  Review of Systems   Constitutional: Negative for diaphoresis, fever and weight loss.   Respiratory: Negative for shortness of breath.    Cardiovascular: Negative for chest pain.   Gastrointestinal: Negative for blood in stool.   Genitourinary: Negative for hematuria.   Endo/Heme/Allergies: Does not bruise/bleed easily.   All other systems reviewed " and are negative.        OBJECTIVE:    PHYSICAL EXAMINATION:   Vitals:    19 1428   BP: 129/83   Pulse: 84       Physical Exam:    Psych/Behavior: He is oriented to person, place, and time.     Neurological:        DTRs: Tricep reflexes are 2+ on the right side and 2+ on the left side. Bicep reflexes are 2+ on the right side and 2+ on the left side. Brachioradialis reflexes are 2+ on the right side and 2+ on the left side. Patellar reflexes are 2+ on the right side and 2+ on the left side. Achilles reflexes are 2+ on the right side and 2+ on the left side.       Back Exam     Tenderness   The patient is experiencing tenderness in the sacroiliac (right).    Muscle Strength   Right Quadriceps:  5/5   Left Quadriceps:  5/5   Right Hamstrings:  5/5   Left Hamstrings:  5/5             SI joint:   Palpation at the right SI joints is painful      Neurologic Exam     Mental Status   Oriented to person, place, and time.   Speech: speech is normal   Level of consciousness: alert    Cranial Nerves   Cranial nerves II through XII intact.     Motor Exam   Muscle bulk: normal  Overall muscle tone: normal    Strength   Right deltoid: 5/5  Left deltoid: 5/5  Right biceps: 5/5  Left biceps: 5/5  Right triceps: 5/5  Left triceps: 5/5  Right wrist flexion: 5/5  Left wrist flexion: 5/5  Right wrist extension: 5/5  Left wrist extension: 5/5  Right interossei: 5/5  Left interossei: 5/5  Right iliopsoas: 5/5  Left iliopsoas: 5/5  Right quadriceps: 5/5  Left quadriceps: 5/5  Right hamstrin/5  Left hamstrin/5  Right anterior tibial: 5/5  Left anterior tibial: 5/5  Right posterior tibial: 5/5  Left posterior tibial: 5/5  Right peroneal: 5/5  Left peroneal: 5/5  Right gastroc: 5/5  Left gastroc: 5/5    Sensory Exam   Light touch normal.   Pinprick normal.     Gait, Coordination, and Reflexes     Gait  Gait: normal    Coordination   Finger to nose coordination: normal  Tandem walking coordination: normal    Reflexes   Right  brachioradialis: 2+  Left brachioradialis: 2+  Right biceps: 2+  Left biceps: 2+  Right triceps: 2+  Left triceps: 2+  Right patellar: 2+  Left patellar: 2+  Right achilles: 2+  Left achilles: 2+  Right plantar: normal  Left plantar: normal  Right Schulz: absent  Left Schulz: absent  Right ankle clonus: absent  Left ankle clonus: absent        DIAGNOSTIC DATA:  I personally interpreted the following imaging:   NA    ASSESMENT:  This is a 58 y.o. male with     Problem List Items Addressed This Visit        Neuro    Sacroiliac joint dysfunction of right side - Primary            PLAN:  SI joint belt  Will consider SI joint fusion with iFuse system after June 2019          Ezequiel Pedroza MD  Cell:[phone]

## 2019-04-18 RX ORDER — OXYCODONE AND ACETAMINOPHEN 10; 325 MG/1; MG/1
1 TABLET ORAL EVERY 8 HOURS PRN
Qty: 90 TABLET | Refills: 0 | Status: SHIPPED | OUTPATIENT
Start: 2019-04-18 | End: 2019-05-21 | Stop reason: SDUPTHER

## 2019-05-01 ENCOUNTER — TELEPHONE (OUTPATIENT)
Dept: NEUROSURGERY | Facility: CLINIC | Age: 59
End: 2019-05-01

## 2019-05-01 NOTE — TELEPHONE ENCOUNTER
Confirmed with Mr. Hayes [facility] does accept with Medicare and he may have to contact insurance in re: transportation. Patient v/u

## 2019-05-01 NOTE — TELEPHONE ENCOUNTER
----- Message from Ezequiel Pedroza MD sent at 5/1/2019 10:14 AM CDT -----  Contact: Self/ [phone]  Please call back  ----- Message -----  From: Tasia Carlin  Sent: 5/1/2019   9:35 AM  To: Yovany Nieves Staff    Patient called in requesting to speak with you. Patient prefers to speak with a nurse. Please call and advise.

## 2019-05-21 NOTE — TELEPHONE ENCOUNTER
----- Message from Sushila Sims sent at 5/21/2019  9:41 AM CDT -----  Contact: [phone]/self  Type:  RX Refill Request    Who Called:   Refill or New Rx:Refill  RX Name and Strength:oxyCODONE-acetaminophen (PERCOCET)  mg per tablet  How is the patient currently taking it? (ex. 1XDay):Take 1 tablet by mouth every 8 (eight) hours as needed for Pain. - Oral  Is this a 30 day or 90 day RX:30  Preferred Pharmacy with phone number:[facility] [address] [facility] [address] [facility]  Local or Mail Order:self  Ordering Provider:Yovany  Would the patient rather a call back or a response via [facility]? call  Best Call Back Number:  Additional Information: It is due on Thursday and he just wants to make sure it will be in. PLEASE CALL HIM WHEN SENT IN.  Thanks

## 2019-05-22 RX ORDER — OXYCODONE AND ACETAMINOPHEN 10; 325 MG/1; MG/1
1 TABLET ORAL EVERY 8 HOURS PRN
Qty: 90 TABLET | Refills: 0 | Status: SHIPPED | OUTPATIENT
Start: 2019-05-22 | End: 2019-06-25 | Stop reason: SDUPTHER

## 2019-06-07 ENCOUNTER — TELEPHONE (OUTPATIENT)
Dept: NEUROSURGERY | Facility: CLINIC | Age: 59
End: 2019-06-07

## 2019-06-07 NOTE — TELEPHONE ENCOUNTER
----- Message from Mony Coronado sent at 6/7/2019 10:23 AM CDT -----  Contact: [phone]/ self   Type:  Sooner Apoointment Request    Caller is requesting a sooner appointment.  Caller declined first available appointment listed below.  Caller will not accept being placed on the waitlist and is requesting a message be sent to doctor.  Name of Caller: Ezekiel Noyola  When is the first available appointment? 8/5  Symptoms: pt stated he's coming in for follow up   Would the patient rather a call back or a response via [facility]? Call Back  Best Call Back Number: [phone]  Additional Information: n/a

## 2019-06-17 ENCOUNTER — HOSPITAL ENCOUNTER (OUTPATIENT)
Dept: RADIOLOGY | Facility: HOSPITAL | Age: 59
Discharge: HOME | End: 2019-06-17
Attending: NEUROLOGICAL SURGERY
Payer: MEDICARE

## 2019-06-17 ENCOUNTER — OFFICE VISIT (OUTPATIENT)
Dept: NEUROSURGERY | Facility: CLINIC | Age: 59
End: 2019-06-17
Payer: MEDICARE

## 2019-06-17 VITALS
BODY MASS INDEX: 28.43 KG/M2 | SYSTOLIC BLOOD PRESSURE: 126 MMHG | DIASTOLIC BLOOD PRESSURE: 84 MMHG | HEART RATE: 72 BPM | WEIGHT: 221.56 LBS | HEIGHT: 74 IN

## 2019-06-17 DIAGNOSIS — M96.1 FAILED BACK SURGICAL SYNDROME: ICD-10-CM

## 2019-06-17 DIAGNOSIS — Z98.1 S/P LUMBAR FUSION: Primary | ICD-10-CM

## 2019-06-17 DIAGNOSIS — G89.4 CHRONIC PAIN SYNDROME: ICD-10-CM

## 2019-06-17 DIAGNOSIS — F11.20 NARCOTIC DEPENDENCE: ICD-10-CM

## 2019-06-17 DIAGNOSIS — Z98.1 S/P LUMBAR FUSION: ICD-10-CM

## 2019-06-17 PROCEDURE — 72100 XR LUMBAR SPINE AP AND LATERAL: ICD-10-PCS | Mod: 26,,, | Performed by: RADIOLOGY

## 2019-06-17 PROCEDURE — 72100 X-RAY EXAM L-S SPINE 2/3 VWS: CPT | Mod: TC,FY

## 2019-06-17 PROCEDURE — 99213 OFFICE O/P EST LOW 20 MIN: CPT | Mod: S$PBB,,, | Performed by: NEUROLOGICAL SURGERY

## 2019-06-17 PROCEDURE — 99999 PR PBB SHADOW E&M-EST. PATIENT-LVL III: CPT | Mod: PBBFAC,,, | Performed by: NEUROLOGICAL SURGERY

## 2019-06-17 PROCEDURE — 99213 OFFICE O/P EST LOW 20 MIN: CPT | Mod: PBBFAC,PN,25 | Performed by: NEUROLOGICAL SURGERY

## 2019-06-17 PROCEDURE — 99213 PR OFFICE/OUTPT VISIT, EST, LEVL III, 20-29 MIN: ICD-10-PCS | Mod: S$PBB,,, | Performed by: NEUROLOGICAL SURGERY

## 2019-06-17 PROCEDURE — 72100 X-RAY EXAM L-S SPINE 2/3 VWS: CPT | Mod: 26,,, | Performed by: RADIOLOGY

## 2019-06-17 PROCEDURE — 99999 PR PBB SHADOW E&M-EST. PATIENT-LVL III: ICD-10-PCS | Mod: PBBFAC,,, | Performed by: NEUROLOGICAL SURGERY

## 2019-06-17 NOTE — PROGRESS NOTES
NEUROSURGICAL PROGRESS NOTE    DATE OF SERVICE:  06/17/2019    ATTENDING PHYSICIAN:  Ezequiel Pedroza MD    SUBJECTIVE:    INTERIM HISTORY:    This is a very pleasant 59 y.o. male, who is status post right hip arthrodesis in the 70s at [facility].  Status post lumbar fusion with recent interbody fusion at L5-S1 with revision of instrumentation in December 2018.  Since that revision surgery the patient reports some improvement in his low back pain however he still has significant difficulty walking and bilateral hip pain worse on the right side.  He also reports paresthesias in the S1 distribution bilaterally and numbness in his toes.  The pain has become worse.  He is taking narcotics on a regular basis.  Takes Percocet 10 mg of Flexeril 10 mg every 8 hr.  He has tried physical therapy without significant pain relief.  He has received a recent right SI joint injection that did not give him pain relief.  He has tried gabapentin in the past without significant relief is wearing a SI joint belt and a back brace every day to help with his pain.  He has some residual sagittal plane imbalance on scoliosis films.     Low Back Pain Scale  R Low Back-Pain Score: 9  R Low Back-Pain Intensity: Pain killers give moderate relief from pain  R Low Back-Pain Score: I can look after myself normally but it causes extra pain  Low Back-Lifting: Pain prevents me from lifting heavy weights  but I can manage light weights if they are conveniently placed   Low Back-Walking: Pain prevents me walking more than .25 mile   Low Back-Sitting: Pain prevents me from sitting more than than 10 minutes   Low Back-Standing: I cannot stand for longer than 10 minutes with increasing pain   Low Back-Sleeping: Because of pain my normal nights sleep is reduced by less than three quarters   Low Back-Social Life: (n/a )   Low Back-Traveling: I have extra pain while traveling which compels me to seek alternate forms of travel   Low Back-Changing Degree  of Pain: My pain is gradually worsening         PAST MEDICAL HISTORY:  Active Ambulatory Problems     Diagnosis Date Noted    Bursitis of hip 05/30/2014    DDD (degenerative disc disease), lumbosacral 05/30/2014    Lumbar spondylosis 05/30/2014    Lumbar radiculopathy 05/30/2014    History of back surgery 05/30/2014    Lumbar spinal stenosis 05/30/2014    Hx of surgical fusion joint 05/30/2014    Thoracic or lumbosacral neuritis or radiculitis, unspecified 06/20/2014    Positive cardiac stress test     Chest pain 08/23/2016    Dyspnea 08/23/2016    Spinal stenosis of lumbar region with radiculopathy 12/20/2018    Sacroiliac joint dysfunction of right side 03/22/2019     Resolved Ambulatory Problems     Diagnosis Date Noted    No Resolved Ambulatory Problems     Past Medical History:   Diagnosis Date    Gout     Hepatitis C     Hypertension     Positive cardiac stress test     Positive D dimer        PAST SURGICAL HISTORY:  Past Surgical History:   Procedure Laterality Date    BACK SURGERY      FRACTURE SURGERY Left     leg    FUSION, SPINE, WITH INSTRUMENTATION Removal of spinal hardware, Bilateral Jamison-White Osteotomy L5-S1, L5-S1 Interbody Arthrodesis, L4-S1 Segmental Instrumentation with Posterior Lateral Arthrodesis N/A 12/20/2018    Performed by [facility] at [facility] OR    HIP SURGERY Right     hip fusion    INJECTION, STEROID Right SI Joint Block and Steroid Injection Right 3/22/2019    Per[facility] by [facility] at [facility] OR    INJECTION-STEROID-EPIDURAL-LUMBAR N/A 6/20/2014    Per[facility] by [facility]yl Jr., [facility] at [facility] OR    KNEE ARTHROSCOPY      LAMINECTOMY      Lumbar    Myelogram N/A 11/5/2018    [facility]    right wrist surgery         SOCIAL HISTORY:   Social History     Socioeconomic History    Marital status:      Spouse name: Not on file    Number of children: Not on file    Years of education:  Not on file    Highest education level: Not on file   Occupational History    Not on file   Social Needs    Financial resource strain: Not on file    Food insecurity:     Worry: Not on file     Inability: Not on file    Transportation needs:     Medical: Not on file     Non-medical: Not on file   Tobacco Use    Smoking status: Former Smoker   Substance and Sexual Activity    Alcohol use: No    Drug use: Yes     Types: Marijuana    Sexual activity: Not on file   Lifestyle    Physical activity:     Days per week: Not on file     Minutes per session: Not on file    Stress: Not on file   Relationships    Social connections:     Talks on phone: Not on file     Gets together: Not on file     Attends Restorationism service: Not on file     Active member of club or organization: Not on file     Attends meetings of clubs or organizations: Not on file     Relationship status: Not on file   Other Topics Concern    Not on file   Social History Narrative    Not on file       FAMILY HISTORY:  Family History   Problem Relation Age of Onset    Diabetes Sister     Hypertension Sister     Diabetes Brother     Hypertension Brother        CURRENTS MEDICATIONS:  Current Outpatient Medications on File Prior to Visit   Medication Sig Dispense Refill    olmesartan-hydrochlorothiazide (BENICAR HCT) 20-12.5 mg per tablet Take 1 tablet by mouth once daily.      oxyCODONE-acetaminophen (PERCOCET)  mg per tablet Take 1 tablet by mouth every 8 (eight) hours as needed for Pain. 90 tablet 0    aspirin 81 MG Chew Take 81 mg by mouth once daily.      cyclobenzaprine (FLEXERIL) 10 MG tablet Take 1 tablet (10 mg total) by mouth 3 (three) times daily as needed for Muscle spasms. 60 tablet 0     No current facility-administered medications on file prior to visit.        ALLERGIES:  Review of patient's allergies indicates:   Allergen Reactions    Lactose     Gabapentin Itching     C/O med causing bump like rash on lower trunk     "Penicillins Rash     Pt states a "a couple of years ago" he took penicillin and broke out in "red spots" with no itching or difficulty breathing       REVIEW OF SYSTEMS:  Review of Systems   Constitutional: Negative for diaphoresis, fever and weight loss.   Respiratory: Negative for shortness of breath.    Cardiovascular: Negative for chest pain.   Gastrointestinal: Negative for blood in stool.   Genitourinary: Negative for hematuria.   Endo/Heme/Allergies: Does not bruise/bleed easily.   All other systems reviewed and are negative.        OBJECTIVE:    PHYSICAL EXAMINATION:   Vitals:    06/17/19 1515   BP: 126/84   Pulse: 72       Physical Exam:  Vitals reviewed.    Constitutional: He appears well-developed and well-nourished.     Eyes: Pupils are equal, round, and reactive to light. Conjunctivae and EOM are normal.     Cardiovascular: Normal distal pulses and no edema.     Abdominal: Soft.     Skin: Skin displays no rash on trunk and no rash on extremities. Skin displays no lesions on trunk and no lesions on extremities.     Psych/Behavior: He is alert. He is oriented to person, place, and time. He has a normal mood and affect.     Musculoskeletal:        Neck: Range of motion is full.     Neurological:        DTRs: Tricep reflexes are 2+ on the right side and 2+ on the left side. Bicep reflexes are 2+ on the right side and 2+ on the left side. Brachioradialis reflexes are 2+ on the right side and 2+ on the left side. Patellar reflexes are 2+ on the right side and 2+ on the left side. Achilles reflexes are 2+ on the right side and 2+ on the left side.       Back Exam     Tenderness   The patient is experiencing tenderness in the lumbar and sacroiliac (Severe right SI joint pain).    Range of Motion   Extension: abnormal   Flexion: abnormal   Lateral bend right: abnormal   Lateral bend left: abnormal   Rotation right: abnormal   Rotation left: abnormal     Muscle Strength   Right Quadriceps:  5/5   Left Quadriceps: "  5/5   Right Hamstrings:  5/5   Left Hamstrings:  5/5             Neurologic Exam     Mental Status   Oriented to person, place, and time.   Speech: speech is normal   Level of consciousness: alert    Cranial Nerves   Cranial nerves II through XII intact.     CN III, IV, VI   Pupils are equal, round, and reactive to light.  Extraocular motions are normal.     Motor Exam   Muscle bulk: normal  Overall muscle tone: normal    Strength   Right deltoid: 5/5  Left deltoid: 5/5  Right biceps: 5/5  Left biceps: 5/5  Right triceps: 5/5  Left triceps: 5/5  Right wrist flexion: 5/5  Left wrist flexion: 5/5  Right wrist extension: 5/5  Left wrist extension: 5/5  Right interossei: 5/5  Left interossei: 5/5  Right iliopsoas: 5/5  Left iliopsoas: 5/5  Right quadriceps: 5/5  Left quadriceps: 5/5  Right hamstrin/5  Left hamstrin/5  Right anterior tibial: 5/5  Left anterior tibial: 5/5  Right posterior tibial: 5/5  Left posterior tibial: 5/5  Right peroneal: 5/5  Left peroneal: 5/5  Right gastroc: 5/5  Left gastroc: 5/5    Sensory Exam   Light touch normal.   Pinprick normal.     Gait, Coordination, and Reflexes     Gait  Gait: normal    Coordination   Finger to nose coordination: normal  Tandem walking coordination: normal    Reflexes   Right brachioradialis: 2+  Left brachioradialis: 2+  Right biceps: 2+  Left biceps: 2+  Right triceps: 2+  Left triceps: 2+  Right patellar: 2+  Left patellar: 2+  Right achilles: 2+  Left achilles: 2+  Right plantar: normal  Left plantar: normal  Right Schulz: absent  Left Schulz: absent  Right ankle clonus: absent  Left ankle clonus: absent        DIAGNOSTIC DATA:  I personally interpreted the following imaging:   Lumbar x-rays today shows consolidation the L4-S1 fusion    ASSESMENT:  This is a 59 y.o. male with     Problem List Items Addressed This Visit     None      Visit Diagnoses     S/P lumbar fusion    -  Primary    Relevant Orders    X-Ray Lumbar Spine Ap And Lateral    Chronic  pain syndrome        Failed back surgical syndrome        Narcotic dependence                PLAN:  I explained the natural history of the disease and all treatment options. I recommended a T9-10 spinal cord stimulator trial using the Nevro system to help with this chronic back and leg bilateral leg.  If he has more than 50% pain relief after the trial we will consider implantation a T9-10 paddle lead with pulse generator.  The patient will have a psychology evaluation before the trial.       We have discussed the risks of surgery including bleeding, infection, failure of surgery, CSF leak, nerve root injury, spinal cord injury, weakness, paralysis, peripheral neuropathy, malplaced hardware, migration of hardware, need for reoperation. Patient understands the risks and would like to proceed with surgery.          Ezequiel Pedroza MD  Cell:[phone]

## 2019-06-21 ENCOUNTER — TELEPHONE (OUTPATIENT)
Dept: NEUROSURGERY | Facility: CLINIC | Age: 59
End: 2019-06-21

## 2019-06-21 NOTE — TELEPHONE ENCOUNTER
Spoke with pt and he stated he is out of the percocet pain medication. I advised that I will forward the message to Dr. Pedroza.

## 2019-06-21 NOTE — TELEPHONE ENCOUNTER
----- Message from Nilsa Molina sent at 6/21/2019 12:16 PM CDT -----  Contact: self, [phone]  Patient states he is calling back today regarding his Percocet medication refill. Please call.

## 2019-06-24 ENCOUNTER — TELEPHONE (OUTPATIENT)
Dept: NEUROSURGERY | Facility: CLINIC | Age: 59
End: 2019-06-24

## 2019-06-24 DIAGNOSIS — Z98.890 PREVIOUS BACK SURGERY: ICD-10-CM

## 2019-06-24 DIAGNOSIS — M54.9 CHRONIC BACK PAIN GREATER THAN 3 MONTHS DURATION: Primary | ICD-10-CM

## 2019-06-24 DIAGNOSIS — Z96.89 S/P INSERTION OF SPINAL CORD STIMULATOR: ICD-10-CM

## 2019-06-24 DIAGNOSIS — G89.29 CHRONIC BACK PAIN GREATER THAN 3 MONTHS DURATION: Primary | ICD-10-CM

## 2019-06-24 NOTE — TELEPHONE ENCOUNTER
----- Message from Quincy Vasquez sent at 6/24/2019  8:10 AM CDT -----  Contact: [phone]/self  Type:  RX Refill Request    Who Called: patient   Refill or New Rx:refill  RX Name and Strength:oxyCODONE-acetaminophen (PERCOCET)  mg per tablet  How is the patient currently taking it? (ex. 1XDay):1x every 8 hours  Is this a 30 day or 90 day RX:unknown  Preferred Pharmacy with phone number:[facility] # [phone]  Local or Mail Order:local  Ordering Provider:Dr. Pedroza  Would the patient rather a call back or a response via [facility]? Call back  Best Call Back Number:[phone]  Additional Information: patient would like a call back once Rx has been called in

## 2019-06-25 RX ORDER — OXYCODONE AND ACETAMINOPHEN 10; 325 MG/1; MG/1
1 TABLET ORAL EVERY 8 HOURS PRN
Qty: 90 TABLET | Refills: 0 | Status: SHIPPED | OUTPATIENT
Start: 2019-06-25 | End: 2019-07-22 | Stop reason: SDUPTHER

## 2019-06-26 ENCOUNTER — TELEPHONE (OUTPATIENT)
Dept: NEUROSURGERY | Facility: CLINIC | Age: 59
End: 2019-06-26

## 2019-07-09 ENCOUNTER — TELEPHONE (OUTPATIENT)
Dept: NEUROSURGERY | Facility: CLINIC | Age: 59
End: 2019-07-09

## 2019-07-10 NOTE — TELEPHONE ENCOUNTER
----- Message from Ezequiel Pedroza MD sent at 7/9/2019  9:33 PM CDT -----  Contact: [phone]/Nicole/[facility]      ----- Message -----  From: Sushila Sims  Sent: 7/9/2019   1:18 PM  To: Yovany Nieves Staff    [facility] is requesting a call to collect information about the patient. She will be faxing a request today. Thanks

## 2019-07-10 NOTE — TELEPHONE ENCOUNTER
Left message with Bettye gatica/ Shan Duran for Nicole to contact clinic - fax number given to Bettye.

## 2019-07-15 ENCOUNTER — TELEPHONE (OUTPATIENT)
Dept: ORTHOPEDICS | Facility: CLINIC | Age: 59
End: 2019-07-15

## 2019-07-17 ENCOUNTER — TELEPHONE (OUTPATIENT)
Dept: NEUROSURGERY | Facility: CLINIC | Age: 59
End: 2019-07-17

## 2019-07-17 NOTE — TELEPHONE ENCOUNTER
----- Message from Iraida Dumont sent at 7/17/2019 10:35 AM CDT -----  Contact: pt  Pt would like to be called back regarding his care    Pt can be reached at [phone]

## 2019-07-17 NOTE — TELEPHONE ENCOUNTER
----- Message from Kasandra Aponte sent at 7/16/2019 10:42 AM CDT -----  Contact: Self [phone]  Patient is requesting to talk to nurse.

## 2019-07-17 NOTE — TELEPHONE ENCOUNTER
"Spoke with Mr Falcon- he states " I'm not dealing with the people y'all sent me to for the psych eval. You need to find someone else, I don't like their attitude and I wasn't giving my insurance information over the phone- they weren't understanding that"    Mr. Falcon was advised to contact his insurance co. Re: psych in network - per patient he has new insurance. Will contact patient re: eval.        "

## 2019-07-17 NOTE — TELEPHONE ENCOUNTER
Spoke with Mr. Noyola, number for [facility] has been given. Patient will contact them to schedule appt.

## 2019-07-18 ENCOUNTER — TELEPHONE (OUTPATIENT)
Dept: NEUROSURGERY | Facility: CLINIC | Age: 59
End: 2019-07-18

## 2019-07-18 NOTE — TELEPHONE ENCOUNTER
Spoke with Mr Dennys, pt has rescheduled surgery to 08/23/19.    Spoke w/ Valentina in scheduling - [facility]    Spoke w/ Abilio in scheduling she will pull pt and place on 08/23.

## 2019-07-18 NOTE — TELEPHONE ENCOUNTER
----- Message from Zee Hicks sent at 7/17/2019  2:04 PM CDT -----  Contact: self / [phone]  Patient is requesting a call back regarding, to reschedule his procedure. Please advise

## 2019-07-22 ENCOUNTER — ANESTHESIA EVENT (OUTPATIENT)
Dept: SURGERY | Facility: HOSPITAL | Age: 59
End: 2019-07-22
Payer: COMMERCIAL

## 2019-07-22 ENCOUNTER — TELEPHONE (OUTPATIENT)
Dept: NEUROSURGERY | Facility: CLINIC | Age: 59
End: 2019-07-22

## 2019-07-22 DIAGNOSIS — E11.9 DIABETES MELLITUS WITHOUT COMPLICATION: ICD-10-CM

## 2019-07-22 DIAGNOSIS — Z01.818 PREOPERATIVE TESTING: Primary | ICD-10-CM

## 2019-07-22 RX ORDER — OXYCODONE AND ACETAMINOPHEN 10; 325 MG/1; MG/1
1 TABLET ORAL EVERY 8 HOURS PRN
Qty: 90 TABLET | Refills: 0 | Status: SHIPPED | OUTPATIENT
Start: 2019-07-22 | End: 2019-08-19 | Stop reason: SDUPTHER

## 2019-07-22 RX ORDER — METFORMIN HYDROCHLORIDE 500 MG/1
500 TABLET, FILM COATED, EXTENDED RELEASE ORAL 2 TIMES DAILY
COMMUNITY
End: 2022-07-07

## 2019-07-22 NOTE — TELEPHONE ENCOUNTER
----- Message from Milton Coronado sent at 7/22/2019 10:43 AM CDT -----  Contact: patient  Type:  RX Refill Request    Who Called: Ezekiel Michel or New Rx: refill  RX Name and Strength: oxyCODONE-acetaminophen (PERCOCET)  mg per tablet  How is the patient currenty taking it? (ex. 1XDay): as needed  Is this a 30 day or 90 day RX: 30 day  [facility] [address] [address]  Local or Mail Order: local  Ordering Provider: Yovany  Would the patient rather a call back or a response via [facility]?  Call back  Best Call Back Number: [phone]  Additional Information:  Please send to the pharmacy listed above

## 2019-07-22 NOTE — ANESTHESIA PREPROCEDURE EVALUATION
Anesthesia Assessment: Preoperative EQUATION     Planned Procedure: Procedure(s) (LRB):  Trial, Neurostimulator, Spinal Cord Spinal Cord Stimulator Trial using 2 Percutaneous Least at T9-10 (N/A)  Requested Anesthesia Type:General  Surgeon: Ezequiel Pedroza MD  Service: Neurosurgery  Known or anticipated Date of Surgery:8/23/2019, date changed to 9/20/2019     Surgeon notes: reviewed     Electronic QUestionnaire Assessment completed via nurse interview with patient.    NO AQ     Triage considerations:      The patient has no apparent active cardiac condition (No unstable coronary Syndrome such as severe unstable angina or recent [<1 month] myocardial infarction, decompensated CHF, severe valvular   disease or significant arrhythmia)     Previous anesthesia records:MAC and No problems  3/22/2019    INJECTION, STEROID Right SI Joint Block and Steroid Injection (Right Hip)          Last PCP note: 3-6 months ago , outside [facility]   Subspecialty notes: NEUROSURGERY     Other important co-morbidities: HTN, DM,GOUT,NARCOTIC DEPENDENT,& H/O HEPATITIS C     Tests already available:  Available tests,  within 3 months , within [facility] .6/17/2019 XRAY LUMBAR SPINE AP AND LAT, 3/22/2019 POCT GLUC                            Instructions given. (See in Nurse's note)     Optimization:               Plan:    Testing:  BMP, A1C and EKG                           Patient  has previously scheduled Medical Appointment:NONE     Navigation: Tests Scheduled. TBD                                    Results will be tracked by Preop Clinic.   7/22 Discussed case with Dr. Sykes. Rec: bmp                            Electronically signed by Darlene Walters RN at 7/22/2019 12:37 PM  Electronically signed by Darlene Walters RN at 7/22/2019 12:44 PM       Pre-admit on 8/23/2019          Revision History          Detailed Report    7/30 Labs and EKG resulted and noted.                                                                                                                   07/22/2019  Ezekiel Noyola is a 59 y.o., male.    Anesthesia Evaluation    I have reviewed the Patient Summary Reports.    I have reviewed the Nursing Notes.   I have reviewed the Medications.     Review of Systems  Anesthesia Hx:  No problems with previous Anesthesia  History of prior surgery of interest to airway management or planning: Previous anesthesia: General Denies Family Hx of Anesthesia complications.   Denies Personal Hx of Anesthesia complications.   Social:  Former Smoker, No Alcohol Use    Hematology/Oncology:  Hematology Normal   Oncology Normal     EENT/Dental:EENT/Dental Normal   Cardiovascular:   Hypertension  Denies Angina.  Functional Capacity 2 METS, able to climb 4-5 steps  Hypertension , Well Controlled on Rx , Recent typical clinic B/P of 126/84    Pulmonary:   Shortness of breath Denies Recent URI. A little sob when in pain.   Renal/:  Renal/ Normal     Hepatic/GI:   Liver Disease, Hepatitis, C  Esophageal / Stomach Disorders Gerd  Liver Disease (h/o hepatitis c)    Musculoskeletal:   Arthritis   Musculoskeletal General/Symptoms: Functional capacity is ambulatory without assistance.  Joint Disease:  Arthritis All  joints Lumbar Spine Disorders, Lumbar Disc Disease, Radiculopathy Lumbar spinal stenosis, DDD- LUMBAR  Neurological:   Neuromuscular Disease,  Neuro Symptoms of pain Of lower back and kyle hip pain, difficulty walkingNeuromuscular Disease Lumbar radiculopathy  Endocrine:   Diabetes  Diabetes , controlled by oral hypoglycemics. Typical AM glucose range: 108    Psych:   NARCOTIC DEPENDANT       Patient Active Problem List   Diagnosis    Bursitis of hip    DDD (degenerative disc disease), lumbosacral    Lumbar spondylosis    Lumbar radiculopathy    History of back surgery    Lumbar spinal stenosis    Hx of surgical fusion joint    Thoracic or lumbosacral neuritis or radiculitis, unspecified    Positive cardiac stress test    Chest pain     Dyspnea    Spinal stenosis of lumbar region with radiculopathy    Sacroiliac joint dysfunction of right side    Spinal stenosis     Past Medical History:   Diagnosis Date    Diabetes mellitus     Gout     Hepatitis C     Hypercholesteremia     Hypertension     Positive cardiac stress test     Positive D dimer      Past Surgical History:   Procedure Laterality Date    BACK SURGERY      FRACTURE SURGERY Left     leg    FUSION, SPINE, WITH INSTRUMENTATION Removal of spinal hardware, Bilateral Jamison-White Osteotomy L5-S1, L5-S1 Interbody Arthrodesis, L4-S1 Segmental Instrumentation with Posterior Lateral Arthrodesis N/A 12/20/2018    Per[facility] at [facility] OR    HIP SURGERY Right     hip fusion    INJECTION, STEROID Right SI Joint Block and Steroid Injection Right 3/22/2019    [facility] at [facility] OR    INJECTION-STEROID-EPIDURAL-LUMBAR N/A 6/20/2014    [facility] Jr[facility] OR    KNEE ARTHROSCOPY      LAMINECTOMY      Lumbar    Myelogram N/A 11/5/2018    Performed by [facility] at [facility] OR 2ND FLR    right wrist surgery           Physical Exam  General:  Well nourished    Airway/Jaw/Neck:  Airway Findings: Mouth Opening: Normal Tongue: Normal  General Airway Assessment: Adult  Mallampati: II  TM Distance: Normal, at least 6 cm  Jaw/Neck Findings:  Neck ROM: Normal ROM      Dental:  Dental Findings: In tact   Chest/Lungs:  Chest/Lungs Findings: Clear to auscultation     Heart/Vascular:  Heart Findings: Rate: Normal  Rhythm: Regular Rhythm  Sounds: Normal        Mental Status:  Mental Status Findings:  Cooperative, Alert and Oriented         Anesthesia Plan  Type of Anesthesia, risks & benefits discussed:  Anesthesia Type:  general  Patient's Preference:   Intra-op Monitoring Plan: standard ASA monitors  Intra-op Monitoring Plan Comments:   Post Op Pain Control Plan: per primary service following discharge from PACU  Post  Op Pain Control Plan Comments:   Induction:   IV  Beta Blocker:  Patient is not currently on a Beta-Blocker (No further documentation required).       Informed Consent: Patient understands risks and agrees with Anesthesia plan.  Questions answered. Anesthesia consent signed with patient.  ASA Score: 3     Day of Surgery Review of History & Physical:    H&P update referred to the surgeon.         Ready For Surgery From Anesthesia Perspective.

## 2019-07-22 NOTE — PRE ADMISSION SCREENING
Anesthesia Assessment: Preoperative EQUATION    Planned Procedure: Procedure(s) (LRB):  Trial, Neurostimulator, Spinal Cord Spinal Cord Stimulator Trial using 2 Percutaneous Least at T9-10 (N/A)  Requested Anesthesia Type:General  Surgeon: Ezequiel Pedroza MD  Service: Neurosurgery  Known or anticipated Date of Surgery:8/23/2019, date changed to 9/20/2019    Surgeon notes: reviewed    Electronic QUestionnaire Assessment completed via nurse interview with patient.    NO AQ    Triage considerations:     The patient has no apparent active cardiac condition (No unstable coronary Syndrome such as severe unstable angina or recent [<1 month] myocardial infarction, decompensated CHF, severe valvular   disease or significant arrhythmia)    Previous anesthesia records:MAC and No problems  3/22/2019    INJECTION, STEROID Right SI Joint Block and Steroid Injection (Right Hip)        Last PCP note: 3-6 months ago , outside [facility]   Subspecialty notes: NEUROSURGERY    Other important co-morbidities: HTN, DM,GOUT,NARCOTIC DEPENDENT,& H/O HEPATITIS C     Tests already available:  Available tests,  within 3 months , within [facility] .6/17/2019 XRAY LUMBAR SPINE AP AND LAT, 3/22/2019 POCT GLUC            Instructions given. (See in Nurse's note)    Optimization:      Plan:    Testing:  BMP, A1C and EKG     Patient  has previously scheduled Medical Appointment:NONE    Navigation: Tests Scheduled. TBD                       Results will be tracked by Preop Clinic.   7/22 Discussed case with Dr. Sykes. Rec: bmp

## 2019-07-22 NOTE — PRE-PROCEDURE INSTRUCTIONS
Patient stated has not had any problems with anesthesia in the past. Will need labs and ekg. Our  will call to set up these appts. Preop instructions given. Hold asa, asa containing products, nsaids, vitamins and supplements one week prior to surgery. Medication instructions given.   Shower the night before surgery and the morning of surgery with an antibacterial soap( hibiclens or dial antibacterial soap).  Nothing on the skin once shower. Do not apply any deodorant, lotion, powder, perfume,or aftershave.  No jewelry going to surgery.  May have solid foods, gum, and hard candy until 8 hours before surgery/procedure time.  May have clear liquids( water, gatorade, powerade or apple juice) until 2 hours prior to surgery/procedure time.  No red or orange drinks. If in doubt , drink water. Nothing to drink 2 hours before arrival time for surgery/procedure. If you are told to take medication in the morning of surgery, it may be taken with a sip of water. If your doctor tells you something different pertaining to when to stop eating or drinking, follow your doctor's instructions.Call for changes in status or questions. Directions to the surgery center given. Verbalizes understanding.

## 2019-07-22 NOTE — TELEPHONE ENCOUNTER
----- Message from Darlene Walters RN sent at 7/22/2019 12:37 PM CDT -----  Patient is scheduled for surgery on 8/23 at [facility]. It looks like his surgery may have been scheduled at [facility] earlier. He has pre admit testing and stay at [facility] on 7/25. Do you need to cancel this? Please let the patient know. Our office is ordering bmp, hga1c, and ekg.Thanks!

## 2019-07-23 ENCOUNTER — TELEPHONE (OUTPATIENT)
Dept: PREADMISSION TESTING | Facility: HOSPITAL | Age: 59
End: 2019-07-23

## 2019-07-23 ENCOUNTER — TELEPHONE (OUTPATIENT)
Dept: NEUROSURGERY | Facility: CLINIC | Age: 59
End: 2019-07-23

## 2019-07-23 NOTE — TELEPHONE ENCOUNTER
----- Message from Dalrene Walters RN sent at 7/22/2019 12:53 PM CDT -----  Please schedule labs and ekg. Thanks!

## 2019-07-23 NOTE — TELEPHONE ENCOUNTER
----- Message from Luci ALMARAZLuiza Avelar sent at 7/23/2019 10:06 AM CDT -----  Contact: [phone] self   Pt is requesting to speak with you re: a soap wash. Pt states that you prescribed this medication a couple of weeks ago from a specialty pharmacy. Please advise

## 2019-07-23 NOTE — TELEPHONE ENCOUNTER
----- Message from Luci ALMARAZLuiza Avelar sent at 7/22/2019  4:39 PM CDT -----  Contact: Zee gatica/ [facility] [phone]  Zee states that the the insurance company is only allowing 7 day supply. Zee states that she only override if pt has cancer. Insurance would you to call if the pt needs a 30 day supply [phone]. Please advise

## 2019-07-30 ENCOUNTER — HOSPITAL ENCOUNTER (OUTPATIENT)
Dept: CARDIOLOGY | Facility: CLINIC | Age: 59
Discharge: HOME | End: 2019-07-30
Attending: ANESTHESIOLOGY
Payer: COMMERCIAL

## 2019-07-30 DIAGNOSIS — Z01.818 PREOPERATIVE TESTING: ICD-10-CM

## 2019-07-30 PROCEDURE — 93010 ELECTROCARDIOGRAM REPORT: CPT | Mod: S$GLB,,, | Performed by: INTERNAL MEDICINE

## 2019-07-30 PROCEDURE — 93005 ELECTROCARDIOGRAM TRACING: CPT | Mod: PBBFAC | Performed by: INTERNAL MEDICINE

## 2019-07-30 PROCEDURE — 93010 EKG 12-LEAD: ICD-10-PCS | Mod: S$GLB,,, | Performed by: INTERNAL MEDICINE

## 2019-07-30 PROCEDURE — 93005 ELECTROCARDIOGRAM TRACING: CPT | Mod: S$GLB,,, | Performed by: ANESTHESIOLOGY

## 2019-07-30 PROCEDURE — 93005 EKG 12-LEAD: ICD-10-PCS | Mod: S$GLB,,, | Performed by: ANESTHESIOLOGY

## 2019-08-09 ENCOUNTER — TELEPHONE (OUTPATIENT)
Dept: NEUROSURGERY | Facility: CLINIC | Age: 59
End: 2019-08-09

## 2019-08-09 NOTE — TELEPHONE ENCOUNTER
----- Message from Ezequiel Pedroza MD sent at 8/8/2019  2:07 PM CDT -----      ----- Message -----  From: Adriana Coronado  Sent: 8/8/2019  10:02 AM  To: Yovany White Staff    Morning...    This patient is scheduled on 8/23/19 and [facility] is out of network with the patient's insurance carrier...This patient needs to be referred back to his in network provider.    Thank You,  Adriana

## 2019-08-13 ENCOUNTER — TELEPHONE (OUTPATIENT)
Dept: NEUROSURGERY | Facility: CLINIC | Age: 59
End: 2019-08-13

## 2019-08-13 NOTE — TELEPHONE ENCOUNTER
----- Message from Quincy Vasquez sent at 8/12/2019  3:17 PM CDT -----  Contact: Self/[phone]  Pt is calling to apeak to someone about his procedure scheduled for the 23 rd.    He can be reached at [phone].    Thank you

## 2019-08-13 NOTE — TELEPHONE ENCOUNTER
Spoke to the patient his insurance will be changing 9/1/19 and [facility] will be covered. Surgery is moved to 9/19.

## 2019-08-19 ENCOUNTER — TELEPHONE (OUTPATIENT)
Dept: NEUROSURGERY | Facility: CLINIC | Age: 59
End: 2019-08-19

## 2019-08-19 RX ORDER — OXYCODONE AND ACETAMINOPHEN 10; 325 MG/1; MG/1
1 TABLET ORAL EVERY 8 HOURS PRN
Qty: 90 TABLET | Refills: 0 | Status: SHIPPED | OUTPATIENT
Start: 2019-08-19 | End: 2019-09-19 | Stop reason: SDUPTHER

## 2019-08-19 NOTE — TELEPHONE ENCOUNTER
----- Message from Roldan Carpio MA sent at 8/19/2019  8:54 AM CDT -----  Contact: Pt  Pt needs a refill on Oxycodone  called into pharmacy at  [facility] [phone].        Pt can be reached at [phone].      Thanks

## 2019-08-20 ENCOUNTER — TELEPHONE (OUTPATIENT)
Dept: NEUROSURGERY | Facility: CLINIC | Age: 59
End: 2019-08-20

## 2019-08-20 NOTE — TELEPHONE ENCOUNTER
----- Message from Quincy Vasquez sent at 8/20/2019 12:40 PM CDT -----  Contact: [phone]/self  Patient calling to speak with you about medicine   Please advise

## 2019-08-20 NOTE — TELEPHONE ENCOUNTER
----- Message from Quincy Vasquez sent at 8/20/2019 12:40 PM CDT -----  Contact: [phone]/self  Patient calling to speak with you about medicine   Please advise

## 2019-08-23 ENCOUNTER — ANESTHESIA (OUTPATIENT)
Dept: SURGERY | Facility: HOSPITAL | Age: 59
End: 2019-08-23
Payer: COMMERCIAL

## 2019-09-17 ENCOUNTER — TELEPHONE (OUTPATIENT)
Dept: NEUROSURGERY | Facility: CLINIC | Age: 59
End: 2019-09-17

## 2019-09-17 NOTE — TELEPHONE ENCOUNTER
----- Message from Indira Mead sent at 9/17/2019  8:55 AM CDT -----  No. [phone]     What time is the procedure on 9/20/19.   Please call.

## 2019-09-17 NOTE — TELEPHONE ENCOUNTER
----- Message from Yahaira Garcia sent at 9/17/2019  9:16 AM CDT -----  Contact: pt  Type:  RX Refill Request    Who Called:  Pt  Refill or New Rx:refill  RX Name and Strength: oxycodone  mg  How is the patient currently taking it? (ex. 1XDay):3xday  Is this a 30 day or 90 day RX:30  Preferred Pharmacy with phone number:[facility] [phone]  Local or Mail Order:  Ordering Provider Dr Pedroza  Would the patient rather a call back or a response via [facility]?  Call back  Best Call Back Number:[phone]  Additional Information:

## 2019-09-18 ENCOUNTER — TELEPHONE (OUTPATIENT)
Dept: NEUROSURGERY | Facility: CLINIC | Age: 59
End: 2019-09-18

## 2019-09-18 NOTE — TELEPHONE ENCOUNTER
----- Message from Quincy Vasquez sent at 9/18/2019  9:12 AM CDT -----  Contact: [phone]/self  Refill request for oxyCODONE-acetaminophen (PERCOCET)  mg per tablet  PHARMACY: [id number] - [facility] [address]

## 2019-09-19 RX ORDER — OXYCODONE AND ACETAMINOPHEN 10; 325 MG/1; MG/1
1 TABLET ORAL EVERY 8 HOURS PRN
Qty: 90 TABLET | Refills: 0 | Status: ON HOLD | OUTPATIENT
Start: 2019-09-19 | End: 2019-09-20 | Stop reason: SDUPTHER

## 2019-09-20 ENCOUNTER — HOSPITAL ENCOUNTER (OUTPATIENT)
Facility: HOSPITAL | Age: 59
Discharge: HOME | End: 2019-09-20
Attending: NEUROLOGICAL SURGERY | Admitting: NEUROLOGICAL SURGERY
Payer: COMMERCIAL

## 2019-09-20 ENCOUNTER — TELEPHONE (OUTPATIENT)
Dept: NEUROSURGERY | Facility: CLINIC | Age: 59
End: 2019-09-20

## 2019-09-20 VITALS
RESPIRATION RATE: 18 BRPM | HEIGHT: 74 IN | TEMPERATURE: 98 F | SYSTOLIC BLOOD PRESSURE: 117 MMHG | DIASTOLIC BLOOD PRESSURE: 62 MMHG | OXYGEN SATURATION: 98 % | WEIGHT: 225 LBS | HEART RATE: 77 BPM | BODY MASS INDEX: 28.88 KG/M2

## 2019-09-20 DIAGNOSIS — M54.16 SPINAL STENOSIS OF LUMBAR REGION WITH RADICULOPATHY: Primary | ICD-10-CM

## 2019-09-20 DIAGNOSIS — M51.37 DDD (DEGENERATIVE DISC DISEASE), LUMBOSACRAL: ICD-10-CM

## 2019-09-20 DIAGNOSIS — M48.061 SPINAL STENOSIS OF LUMBAR REGION WITH RADICULOPATHY: Primary | ICD-10-CM

## 2019-09-20 DIAGNOSIS — M48.00 SPINAL STENOSIS: ICD-10-CM

## 2019-09-20 LAB
ABO + RH BLD: NORMAL
ALBUMIN SERPL BCP-MCNC: 4 G/DL (ref 3.5–5.2)
ALP SERPL-CCNC: 75 U/L (ref 55–135)
ALT SERPL W/O P-5'-P-CCNC: 27 U/L (ref 10–44)
ANION GAP SERPL CALC-SCNC: 11 MMOL/L (ref 8–16)
APTT BLDCRRT: 25.3 SEC (ref 21–32)
AST SERPL-CCNC: 20 U/L (ref 10–40)
BASOPHILS # BLD AUTO: 0.07 K/UL (ref 0–0.2)
BASOPHILS NFR BLD: 1 % (ref 0–1.9)
BILIRUB SERPL-MCNC: 0.3 MG/DL (ref 0.1–1)
BLD GP AB SCN CELLS X3 SERPL QL: NORMAL
BLOOD GROUP ANTIBODIES SERPL: NORMAL
BUN SERPL-MCNC: 12 MG/DL (ref 6–20)
CALCIUM SERPL-MCNC: 8.9 MG/DL (ref 8.7–10.5)
CHLORIDE SERPL-SCNC: 107 MMOL/L (ref 95–110)
CO2 SERPL-SCNC: 22 MMOL/L (ref 23–29)
CREAT SERPL-MCNC: 1 MG/DL (ref 0.5–1.4)
DIFFERENTIAL METHOD: NORMAL
EOSINOPHIL # BLD AUTO: 0.4 K/UL (ref 0–0.5)
EOSINOPHIL NFR BLD: 6 % (ref 0–8)
ERYTHROCYTE [DISTWIDTH] IN BLOOD BY AUTOMATED COUNT: 14.3 % (ref 11.5–14.5)
EST. GFR  (AFRICAN AMERICAN): >60 ML/MIN/1.73 M^2
EST. GFR  (NON AFRICAN AMERICAN): >60 ML/MIN/1.73 M^2
GLUCOSE SERPL-MCNC: 117 MG/DL (ref 70–110)
HCT VFR BLD AUTO: 43.7 % (ref 40–54)
HGB BLD-MCNC: 14.5 G/DL (ref 14–18)
IMM GRANULOCYTES # BLD AUTO: 0.02 K/UL (ref 0–0.04)
IMM GRANULOCYTES NFR BLD AUTO: 0.3 % (ref 0–0.5)
INR PPP: 1 (ref 0.8–1.2)
INR PPP: 1 (ref 0.8–1.2)
LYMPHOCYTES # BLD AUTO: 2.1 K/UL (ref 1–4.8)
LYMPHOCYTES NFR BLD: 30.7 % (ref 18–48)
MCH RBC QN AUTO: 27.6 PG (ref 27–31)
MCHC RBC AUTO-ENTMCNC: 33.2 G/DL (ref 32–36)
MCV RBC AUTO: 83 FL (ref 82–98)
MONOCYTES # BLD AUTO: 0.8 K/UL (ref 0.3–1)
MONOCYTES NFR BLD: 11.2 % (ref 4–15)
NEUTROPHILS # BLD AUTO: 3.4 K/UL (ref 1.8–7.7)
NEUTROPHILS NFR BLD: 50.8 % (ref 38–73)
NRBC BLD-RTO: 0 /100 WBC
PLATELET # BLD AUTO: 187 K/UL (ref 150–350)
PMV BLD AUTO: 10.1 FL (ref 9.2–12.9)
POCT GLUCOSE: 117 MG/DL (ref 70–110)
POTASSIUM SERPL-SCNC: 4.1 MMOL/L (ref 3.5–5.1)
PROT SERPL-MCNC: 7.5 G/DL (ref 6–8.4)
PROTHROMBIN TIME: 10.5 SEC (ref 9–12.5)
PROTHROMBIN TIME: 10.5 SEC (ref 9–12.5)
RBC # BLD AUTO: 5.26 M/UL (ref 4.6–6.2)
SODIUM SERPL-SCNC: 140 MMOL/L (ref 136–145)
WBC # BLD AUTO: 6.67 K/UL (ref 3.9–12.7)

## 2019-09-20 PROCEDURE — 25000003 PHARM REV CODE 250: Performed by: STUDENT IN AN ORGANIZED HEALTH CARE EDUCATION/TRAINING PROGRAM

## 2019-09-20 PROCEDURE — 63600175 PHARM REV CODE 636 W HCPCS: Performed by: NURSE ANESTHETIST, CERTIFIED REGISTERED

## 2019-09-20 PROCEDURE — C1778 LEAD, NEUROSTIMULATOR: HCPCS | Performed by: NEUROLOGICAL SURGERY

## 2019-09-20 PROCEDURE — D9220A PRA ANESTHESIA: Mod: CRNA,,, | Performed by: NURSE ANESTHETIST, CERTIFIED REGISTERED

## 2019-09-20 PROCEDURE — S0028 INJECTION, FAMOTIDINE, 20 MG: HCPCS | Performed by: NURSE ANESTHETIST, CERTIFIED REGISTERED

## 2019-09-20 PROCEDURE — 82962 GLUCOSE BLOOD TEST: CPT | Performed by: NEUROLOGICAL SURGERY

## 2019-09-20 PROCEDURE — D9220A PRA ANESTHESIA: Mod: ANES,,, | Performed by: ANESTHESIOLOGY

## 2019-09-20 PROCEDURE — 27201423 OPTIME MED/SURG SUP & DEVICES STERILE SUPPLY: Performed by: NEUROLOGICAL SURGERY

## 2019-09-20 PROCEDURE — 86870 RBC ANTIBODY IDENTIFICATION: CPT

## 2019-09-20 PROCEDURE — 85025 COMPLETE CBC W/AUTO DIFF WBC: CPT

## 2019-09-20 PROCEDURE — G0379 DIRECT REFER HOSPITAL OBSERV: HCPCS

## 2019-09-20 PROCEDURE — 36000706: Performed by: NEUROLOGICAL SURGERY

## 2019-09-20 PROCEDURE — 63650 PR PERCUT IMPLNT NEUROELECT,EPIDURAL: ICD-10-PCS | Mod: 59,,, | Performed by: NEUROLOGICAL SURGERY

## 2019-09-20 PROCEDURE — 36000707: Performed by: NEUROLOGICAL SURGERY

## 2019-09-20 PROCEDURE — D9220A PRA ANESTHESIA: ICD-10-PCS | Mod: ANES,,, | Performed by: ANESTHESIOLOGY

## 2019-09-20 PROCEDURE — 00300 ANES ALL PX INTEG H/N/PTRUNK: CPT | Performed by: NEUROLOGICAL SURGERY

## 2019-09-20 PROCEDURE — 85610 PROTHROMBIN TIME: CPT

## 2019-09-20 PROCEDURE — 63600175 PHARM REV CODE 636 W HCPCS: Performed by: STUDENT IN AN ORGANIZED HEALTH CARE EDUCATION/TRAINING PROGRAM

## 2019-09-20 PROCEDURE — 71000016 HC POSTOP RECOV ADDL HR: Performed by: NEUROLOGICAL SURGERY

## 2019-09-20 PROCEDURE — 71000015 HC POSTOP RECOV 1ST HR: Performed by: NEUROLOGICAL SURGERY

## 2019-09-20 PROCEDURE — 37000009 HC ANESTHESIA EA ADD 15 MINS: Performed by: NEUROLOGICAL SURGERY

## 2019-09-20 PROCEDURE — 25000003 PHARM REV CODE 250: Performed by: NURSE ANESTHETIST, CERTIFIED REGISTERED

## 2019-09-20 PROCEDURE — 85730 THROMBOPLASTIN TIME PARTIAL: CPT

## 2019-09-20 PROCEDURE — 63600175 PHARM REV CODE 636 W HCPCS

## 2019-09-20 PROCEDURE — 71000033 HC RECOVERY, INTIAL HOUR: Performed by: NEUROLOGICAL SURGERY

## 2019-09-20 PROCEDURE — D9220A PRA ANESTHESIA: ICD-10-PCS | Mod: CRNA,,, | Performed by: NURSE ANESTHETIST, CERTIFIED REGISTERED

## 2019-09-20 PROCEDURE — G0378 HOSPITAL OBSERVATION PER HR: HCPCS

## 2019-09-20 PROCEDURE — 80053 COMPREHEN METABOLIC PANEL: CPT

## 2019-09-20 PROCEDURE — 25000003 PHARM REV CODE 250: Performed by: NEUROLOGICAL SURGERY

## 2019-09-20 PROCEDURE — 63650 IMPLANT NEUROELECTRODES: CPT | Mod: ,,, | Performed by: NEUROLOGICAL SURGERY

## 2019-09-20 PROCEDURE — 86850 RBC ANTIBODY SCREEN: CPT

## 2019-09-20 PROCEDURE — 37000008 HC ANESTHESIA 1ST 15 MINUTES: Performed by: NEUROLOGICAL SURGERY

## 2019-09-20 PROCEDURE — 94761 N-INVAS EAR/PLS OXIMETRY MLT: CPT

## 2019-09-20 DEVICE — KIT TRIAL LEAD 50CM 5MM: Type: IMPLANTABLE DEVICE | Site: BACK | Status: FUNCTIONAL

## 2019-09-20 RX ORDER — ATORVASTATIN CALCIUM 40 MG/1
40 TABLET, FILM COATED ORAL DAILY
COMMUNITY
End: 2022-07-07

## 2019-09-20 RX ORDER — SODIUM CHLORIDE 9 MG/ML
INJECTION, SOLUTION INTRAVENOUS CONTINUOUS PRN
Status: DISCONTINUED | OUTPATIENT
Start: 2019-09-20 | End: 2019-09-20

## 2019-09-20 RX ORDER — SODIUM CHLORIDE 0.9 % (FLUSH) 0.9 %
10 SYRINGE (ML) INJECTION
Status: DISCONTINUED | OUTPATIENT
Start: 2019-09-20 | End: 2019-09-20 | Stop reason: HOSPADM

## 2019-09-20 RX ORDER — PROPOFOL 10 MG/ML
VIAL (ML) INTRAVENOUS
Status: DISCONTINUED | OUTPATIENT
Start: 2019-09-20 | End: 2019-09-20

## 2019-09-20 RX ORDER — ONDANSETRON 2 MG/ML
INJECTION INTRAMUSCULAR; INTRAVENOUS
Status: DISCONTINUED | OUTPATIENT
Start: 2019-09-20 | End: 2019-09-20

## 2019-09-20 RX ORDER — LIDOCAINE HYDROCHLORIDE AND EPINEPHRINE 10; 10 MG/ML; UG/ML
INJECTION, SOLUTION INFILTRATION; PERINEURAL
Status: DISCONTINUED | OUTPATIENT
Start: 2019-09-20 | End: 2019-09-20 | Stop reason: HOSPADM

## 2019-09-20 RX ORDER — FENTANYL CITRATE 50 UG/ML
INJECTION, SOLUTION INTRAMUSCULAR; INTRAVENOUS
Status: DISCONTINUED | OUTPATIENT
Start: 2019-09-20 | End: 2019-09-20

## 2019-09-20 RX ORDER — HYDROMORPHONE HYDROCHLORIDE 1 MG/ML
INJECTION, SOLUTION INTRAMUSCULAR; INTRAVENOUS; SUBCUTANEOUS
Status: COMPLETED
Start: 2019-09-20 | End: 2019-09-20

## 2019-09-20 RX ORDER — BACITRACIN 50000 [IU]/1
INJECTION, POWDER, FOR SOLUTION INTRAMUSCULAR
Status: DISCONTINUED | OUTPATIENT
Start: 2019-09-20 | End: 2019-09-20 | Stop reason: HOSPADM

## 2019-09-20 RX ORDER — HYDROMORPHONE HYDROCHLORIDE 1 MG/ML
0.2 INJECTION, SOLUTION INTRAMUSCULAR; INTRAVENOUS; SUBCUTANEOUS EVERY 5 MIN PRN
Status: DISCONTINUED | OUTPATIENT
Start: 2019-09-20 | End: 2019-09-20 | Stop reason: HOSPADM

## 2019-09-20 RX ORDER — MIDAZOLAM HYDROCHLORIDE 1 MG/ML
INJECTION, SOLUTION INTRAMUSCULAR; INTRAVENOUS
Status: DISCONTINUED | OUTPATIENT
Start: 2019-09-20 | End: 2019-09-20

## 2019-09-20 RX ORDER — OXYCODONE AND ACETAMINOPHEN 10; 325 MG/1; MG/1
1 TABLET ORAL EVERY 8 HOURS PRN
Qty: 90 TABLET | Refills: 0 | Status: SHIPPED | OUTPATIENT
Start: 2019-09-20 | End: 2019-10-15 | Stop reason: SDUPTHER

## 2019-09-20 RX ORDER — ROCURONIUM BROMIDE 10 MG/ML
INJECTION, SOLUTION INTRAVENOUS
Status: DISCONTINUED | OUTPATIENT
Start: 2019-09-20 | End: 2019-09-20

## 2019-09-20 RX ORDER — OXYCODONE AND ACETAMINOPHEN 10; 325 MG/1; MG/1
1 TABLET ORAL EVERY 6 HOURS PRN
Qty: 24 TABLET | Refills: 0 | Status: SHIPPED | OUTPATIENT
Start: 2019-09-20 | End: 2019-09-20

## 2019-09-20 RX ORDER — LIDOCAINE HCL/PF 100 MG/5ML
SYRINGE (ML) INTRAVENOUS
Status: DISCONTINUED | OUTPATIENT
Start: 2019-09-20 | End: 2019-09-20

## 2019-09-20 RX ORDER — ACETAMINOPHEN 10 MG/ML
INJECTION, SOLUTION INTRAVENOUS
Status: DISCONTINUED | OUTPATIENT
Start: 2019-09-20 | End: 2019-09-20

## 2019-09-20 RX ORDER — DEXAMETHASONE SODIUM PHOSPHATE 4 MG/ML
INJECTION, SOLUTION INTRA-ARTICULAR; INTRALESIONAL; INTRAMUSCULAR; INTRAVENOUS; SOFT TISSUE
Status: DISCONTINUED | OUTPATIENT
Start: 2019-09-20 | End: 2019-09-20

## 2019-09-20 RX ORDER — FAMOTIDINE 10 MG/ML
INJECTION INTRAVENOUS
Status: DISCONTINUED | OUTPATIENT
Start: 2019-09-20 | End: 2019-09-20

## 2019-09-20 RX ORDER — OXYCODONE AND ACETAMINOPHEN 10; 325 MG/1; MG/1
1 TABLET ORAL ONCE
Status: COMPLETED | OUTPATIENT
Start: 2019-09-20 | End: 2019-09-20

## 2019-09-20 RX ORDER — ONDANSETRON 2 MG/ML
4 INJECTION INTRAMUSCULAR; INTRAVENOUS DAILY PRN
Status: DISCONTINUED | OUTPATIENT
Start: 2019-09-20 | End: 2019-09-20 | Stop reason: HOSPADM

## 2019-09-20 RX ADMIN — DEXAMETHASONE SODIUM PHOSPHATE 4 MG: 4 INJECTION, SOLUTION INTRAMUSCULAR; INTRAVENOUS at 07:09

## 2019-09-20 RX ADMIN — MIDAZOLAM HYDROCHLORIDE 2 MG: 1 INJECTION, SOLUTION INTRAMUSCULAR; INTRAVENOUS at 07:09

## 2019-09-20 RX ADMIN — SODIUM CHLORIDE: 0.9 INJECTION, SOLUTION INTRAVENOUS at 07:09

## 2019-09-20 RX ADMIN — HYDROMORPHONE HYDROCHLORIDE 0.2 MG: 1 INJECTION, SOLUTION INTRAMUSCULAR; INTRAVENOUS; SUBCUTANEOUS at 09:09

## 2019-09-20 RX ADMIN — PROPOFOL 200 MG: 10 INJECTION, EMULSION INTRAVENOUS at 07:09

## 2019-09-20 RX ADMIN — FENTANYL CITRATE 100 MCG: 50 INJECTION, SOLUTION INTRAMUSCULAR; INTRAVENOUS at 07:09

## 2019-09-20 RX ADMIN — FAMOTIDINE 20 MG: 10 INJECTION, SOLUTION INTRAVENOUS at 07:09

## 2019-09-20 RX ADMIN — ACETAMINOPHEN 1000 MG: 10 INJECTION, SOLUTION INTRAVENOUS at 07:09

## 2019-09-20 RX ADMIN — FENTANYL CITRATE 100 MCG: 50 INJECTION, SOLUTION INTRAMUSCULAR; INTRAVENOUS at 08:09

## 2019-09-20 RX ADMIN — OXYCODONE HYDROCHLORIDE AND ACETAMINOPHEN 1 TABLET: 10; 325 TABLET ORAL at 09:09

## 2019-09-20 RX ADMIN — LIDOCAINE HYDROCHLORIDE 100 MG: 20 INJECTION, SOLUTION INTRAVENOUS at 07:09

## 2019-09-20 RX ADMIN — SUGAMMADEX 200 MG: 100 INJECTION, SOLUTION INTRAVENOUS at 08:09

## 2019-09-20 RX ADMIN — ROCURONIUM BROMIDE 50 MG: 10 INJECTION, SOLUTION INTRAVENOUS at 07:09

## 2019-09-20 RX ADMIN — ONDANSETRON 4 MG: 2 INJECTION INTRAMUSCULAR; INTRAVENOUS at 07:09

## 2019-09-20 RX ADMIN — FENTANYL CITRATE 50 MCG: 50 INJECTION, SOLUTION INTRAMUSCULAR; INTRAVENOUS at 07:09

## 2019-09-20 RX ADMIN — VANCOMYCIN HYDROCHLORIDE 1 G: 1 INJECTION, POWDER, LYOPHILIZED, FOR SOLUTION INTRAVENOUS at 07:09

## 2019-09-20 RX ADMIN — FENTANYL CITRATE 50 MCG: 50 INJECTION, SOLUTION INTRAMUSCULAR; INTRAVENOUS at 08:09

## 2019-09-20 NOTE — PLAN OF CARE
Patient states they are ready to be discharged. Instructions and prescription (delivered to bedside) to patient. Both verbalize understanding. Patient tolerating po liquids with no difficulty. Patient states pain is at a tolerable level for them. Anesthesia consent and surgical consent in chart upon patient's discharge from [facility].

## 2019-09-20 NOTE — H&P
HPI      Prior H+P is reviewed. My Dennys presents for spinal cord stimulator trial after a long history of back pain worse with walking as well as severe R > L hip pain radiating with pain and numbness down the leg in an S1 distribution. He has tried PT including hydrotherapy, R SI Join injection, and currently wears an SIJ brace, managed with pain medicine. He has been NPO since midnight, and has not taken any anti-plt/coag medications in the last 72 hours.    --Patient evaluated prior to surgery  --All diagnostics and imaging reviewed  --Patient NPO since MN  --No anti-coag/plt medication in the last 72h  --Patient consented and all questions answered  --Further reccs to follow s/p surgery    Ernesto Pina MD  Neurosurgery              ---------------------------------------------------------------------  ATTENDING PHYSICIAN:  Ezequiel Pedroza MD     SUBJECTIVE:     INTERIM HISTORY:     This is a very pleasant 59 y.o. male, who is status post right hip arthrodesis in the 70s at [facility]  Status post lumbar fusion with recent interbody fusion at L5-S1 with revision of instrumentation in December 2018.  Since that revision surgery the patient reports some improvement in his low back pain however he still has significant difficulty walking and bilateral hip pain worse on the right side.  He also reports paresthesias in the S1 distribution bilaterally and numbness in his toes.  The pain has become worse.  He is taking narcotics on a regular basis.  Takes Percocet 10 mg of Flexeril 10 mg every 8 hr.  He has tried physical therapy without significant pain relief.  He has received a recent right SI joint injection that did not give him pain relief.  He has tried gabapentin in the past without significant relief is wearing a SI joint belt and a back brace every day to help with his pain.  He has some residual sagittal plane imbalance on scoliosis films.      Low Back Pain Scale  R Low Back-Pain Score: 9  R Low  Back-Pain Intensity: Pain killers give moderate relief from pain  R Low Back-Pain Score: I can look after myself normally but it causes extra pain  Low Back-Lifting: Pain prevents me from lifting heavy weights  but I can manage light weights if they are conveniently placed   Low Back-Walking: Pain prevents me walking more than .25 mile   Low Back-Sitting: Pain prevents me from sitting more than than 10 minutes   Low Back-Standing: I cannot stand for longer than 10 minutes with increasing pain   Low Back-Sleeping: Because of pain my normal nights sleep is reduced by less than three quarters   Low Back-Social Life: (n/a )   Low Back-Traveling: I have extra pain while traveling which compels me to seek alternate forms of travel   Low Back-Changing Degree of Pain: My pain is gradually worsening        PAST MEDICAL HISTORY:       Active Ambulatory Problems     Diagnosis Date Noted    Bursitis of hip 05/30/2014    DDD (degenerative disc disease), lumbosacral 05/30/2014    Lumbar spondylosis 05/30/2014    Lumbar radiculopathy 05/30/2014    History of back surgery 05/30/2014    Lumbar spinal stenosis 05/30/2014    Hx of surgical fusion joint 05/30/2014    Thoracic or lumbosacral neuritis or radiculitis, unspecified 06/20/2014    Positive cardiac stress test      Chest pain 08/23/2016    Dyspnea 08/23/2016    Spinal stenosis of lumbar region with radiculopathy 12/20/2018    Sacroiliac joint dysfunction of right side 03/22/2019           Resolved Ambulatory Problems     Diagnosis Date Noted    No Resolved Ambulatory Problems           Past Medical History:   Diagnosis Date    Gout      Hepatitis C      Hypertension      Positive cardiac stress test      Positive D dimer           PAST SURGICAL HISTORY:        Past Surgical History:   Procedure Laterality Date    BACK SURGERY        FRACTURE SURGERY Left       leg    FUSION, SPINE, WITH INSTRUMENTATION Removal of spinal hardware, Bilateral Jamison-White  Osteotomy L5-S1, L5-S1 Interbody Arthrodesis, L4-S1 Segmental Instrumentation with Posterior Lateral Arthrodesis N/A 12/20/2018     [facility] at [facility] OR    HIP SURGERY Right       hip fusion    INJECTION, STEROID Right SI Joint Block and Steroid Injection Right 3/22/2019     Per[facility] by [facility] at [facility] OR    INJECTION-STEROID-EPIDURAL-LUMBAR N/A 6/20/2014     Performed by Tyson Neely Jr., MD at [facility] OR    KNEE ARTHROSCOPY        LAMINECTOMY         Lumbar    Myelogram N/A 11/5/2018     Performed by [facility] at [facility] OR 2ND F[facility]    right wrist surgery             SOCIAL HISTORY:   Social History               Socioeconomic History    Marital status:        Spouse name: Not on file    Number of children: Not on file    Years of education: Not on file    Highest education level: Not on file   Occupational History    Not on file   Social Needs    Financial resource strain: Not on file    Food insecurity:       Worry: Not on file       Inability: Not on file    Transportation needs:       Medical: Not on file       Non-medical: Not on file   Tobacco Use    Smoking status: Former Smoker   Substance and Sexual Activity    Alcohol use: No    Drug use: Yes       Types: Marijuana    Sexual activity: Not on file   Lifestyle    Physical activity:       Days per week: Not on file       Minutes per session: Not on file    Stress: Not on file   Relationships    Social connections:       Talks on phone: Not on file       Gets together: Not on file       Attends Sabianism service: Not on file       Active member of club or organization: Not on file       Attends meetings of clubs or organizations: Not on file       Relationship status: Not on file   Other Topics Concern    Not on file   Social History Narrative    Not on file            FAMILY HISTORY:        Family History   Problem Relation Age of Onset    Diabetes Sister      Hypertension  "Sister      Diabetes Brother      Hypertension Brother           CURRENTS MEDICATIONS:         Current Outpatient Medications on File Prior to Visit   Medication Sig Dispense Refill    olmesartan-hydrochlorothiazide (BENICAR HCT) 20-12.5 mg per tablet Take 1 tablet by mouth once daily.        oxyCODONE-acetaminophen (PERCOCET)  mg per tablet Take 1 tablet by mouth every 8 (eight) hours as needed for Pain. 90 tablet 0    aspirin 81 MG Chew Take 81 mg by mouth once daily.        cyclobenzaprine (FLEXERIL) 10 MG tablet Take 1 tablet (10 mg total) by mouth 3 (three) times daily as needed for Muscle spasms. 60 tablet 0      No current facility-administered medications on file prior to visit.          ALLERGIES:  Review of patient's allergies indicates:   Allergen Reactions    Lactose      Gabapentin Itching       C/O med causing bump like rash on lower trunk    Penicillins Rash       Pt states a "a couple of years ago" he took penicillin and broke out in "red spots" with no itching or difficulty breathing         REVIEW OF SYSTEMS:  Review of Systems   Constitutional: Negative for diaphoresis, fever and weight loss.   Respiratory: Negative for shortness of breath.    Cardiovascular: Negative for chest pain.   Gastrointestinal: Negative for blood in stool.   Genitourinary: Negative for hematuria.   Endo/Heme/Allergies: Does not bruise/bleed easily.   All other systems reviewed and are negative.           OBJECTIVE:     PHYSICAL EXAMINATION:       Vitals:     06/17/19 1515   BP: 126/84   Pulse: 72         Physical Exam:  Vitals reviewed.     Constitutional: He appears well-developed and well-nourished.      Eyes: Pupils are equal, round, and reactive to light. Conjunctivae and EOM are normal.      Cardiovascular: Normal distal pulses and no edema.      Abdominal: Soft.      Skin: Skin displays no rash on trunk and no rash on extremities. Skin displays no lesions on trunk and no lesions on extremities. "     Psych/Behavior: He is alert. He is oriented to person, place, and time. He has a normal mood and affect.     Musculoskeletal:        Neck: Range of motion is full.     Neurological:        DTRs: Tricep reflexes are 2+ on the right side and 2+ on the left side. Bicep reflexes are 2+ on the right side and 2+ on the left side. Brachioradialis reflexes are 2+ on the right side and 2+ on the left side. Patellar reflexes are 2+ on the right side and 2+ on the left side. Achilles reflexes are 2+ on the right side and 2+ on the left side.         Back Exam      Tenderness   The patient is experiencing tenderness in the lumbar and sacroiliac (Severe right SI joint pain).     Range of Motion   Extension: abnormal   Flexion: abnormal   Lateral bend right: abnormal   Lateral bend left: abnormal   Rotation right: abnormal   Rotation left: abnormal      Muscle Strength   Right Quadriceps:  5/5   Left Quadriceps:  5/5   Right Hamstrings:  5/5   Left Hamstrings:  5/5                  Neurologic Exam      Mental Status   Oriented to person, place, and time.   Speech: speech is normal   Level of consciousness: alert     Cranial Nerves   Cranial nerves II through XII intact.      CN III, IV, VI   Pupils are equal, round, and reactive to light.  Extraocular motions are normal.      Motor Exam   Muscle bulk: normal  Overall muscle tone: normal     Strength   Right deltoid: 5/5  Left deltoid: 5/5  Right biceps: 5/5  Left biceps: 5/5  Right triceps: 5/5  Left triceps: 5/5  Right wrist flexion: 5/5  Left wrist flexion: 5/5  Right wrist extension: 5/5  Left wrist extension: 5/5  Right interossei: 5/5  Left interossei: 5/5  Right iliopsoas: 5/5  Left iliopsoas: 5/5  Right quadriceps: 5/5  Left quadriceps: 5/5  Right hamstrin/5  Left hamstrin/5  Right anterior tibial: 5/5  Left anterior tibial: 5/5  Right posterior tibial: 5/5  Left posterior tibial: 5/5  Right peroneal: 5/5  Left peroneal: 5/5  Right gastroc: 5/5  Left gastroc:  5/5     Sensory Exam   Light touch normal.   Pinprick normal.      Gait, Coordination, and Reflexes      Gait  Gait: normal     Coordination   Finger to nose coordination: normal  Tandem walking coordination: normal     Reflexes   Right brachioradialis: 2+  Left brachioradialis: 2+  Right biceps: 2+  Left biceps: 2+  Right triceps: 2+  Left triceps: 2+  Right patellar: 2+  Left patellar: 2+  Right achilles: 2+  Left achilles: 2+  Right plantar: normal  Left plantar: normal  Right Schulz: absent  Left Schulz: absent  Right ankle clonus: absent  Left ankle clonus: absent           DIAGNOSTIC DATA:  I personally interpreted the following imaging:   Lumbar x-rays today shows consolidation the L4-S1 fusion     ASSESMENT:  This is a 59 y.o. male with          Problem List Items Addressed This Visit      None               Visit Diagnoses      S/P lumbar fusion    -  Primary     Relevant Orders     X-Ray Lumbar Spine Ap And Lateral     Chronic pain syndrome         Failed back surgical syndrome         Narcotic dependence                    PLAN:  I explained the natural history of the disease and all treatment options. I recommended a T9-10 spinal cord stimulator trial using the Nevro system to help with this chronic back and leg bilateral leg.  If he has more than 50% pain relief after the trial we will consider implantation a T9-10 paddle lead with pulse generator.  The patient will have a psychology evaluation before the trial.       We have discussed the risks of surgery including bleeding, infection, failure of surgery, CSF leak, nerve root injury, spinal cord injury, weakness, paralysis, peripheral neuropathy, malplaced hardware, migration of hardware, need for reoperation. Patient understands the risks and would like to proceed with surgery.

## 2019-09-20 NOTE — TELEPHONE ENCOUNTER
----- Message from Ernesto Pina MD sent at 9/20/2019 11:14 AM CDT -----  Patient had a spinal cord perc stimulator lead placed today. He has an external battery connected that is turned on and will be receiving more instructions on its use over the phone. He will need follow up in Dr. Yovany saleh for discussion of paddle electrode placement in about 1 week.     Thank you,  Ernesto

## 2019-09-20 NOTE — BRIEF OP NOTE
[facility]  Brief Operative Note  Neurosurgery    SUMMARY     Surgery Date: 9/20/2019     Surgeon(s) and Role:     * Ezequiel Pedroza MD - Primary     * Ernesto Pina MD - Resident - Assisting        Pre-op Diagnosis:  Chronic back pain greater than 3 months duration [M54.9, G89.29]  Previous back surgery [Z98.890]    Post-op Diagnosis: Post-Op Diagnosis Codes:     * Chronic back pain greater than 3 months duration [M54.9, G89.29]     * Previous back surgery [Z98.890]    Procedure Performed:     Procedure(s) (LRB):  Trial, Neurostimulator, Spinal Cord Spinal Cord Stimulator Trial using 2 Percutaneous Least at T9-10 (N/A)    Technical Procedures Used:     Prone on ralph table  Skin incision lateral L2  Fluoroscopic localization to T11-12  Insertion and guidance to T8, T9 for percutaneous leads (Nevro)  Nylon anchoring of external leads  Bacitracin, Telfa, Tegederm       Description of the findings of the procedure: See full op report    Estimated Blood Loss: Minimal         Specimens:   Specimen (12h ago, onward)    None

## 2019-09-20 NOTE — DISCHARGE SUMMARY
[facility]  Neurosurgery  Discharge Summary      Patient Name: Ezekiel Noyola  MRN: [id number]  Admission Date: 9/20/2019  Hospital Length of Stay: 0 days  Discharge Date and Time:  09/20/2019 11:04 AM  Attending Physician: Ezequiel Pedroza MD  Discharging Provider: Ernesto Pina MD  Primary Care Physician: Dionisio Avila MD    HPI:   This is a very pleasant 59 y.o. male, who is status post right hip arthrodesis in the 70s at [facility].  Status post lumbar fusion with recent interbody fusion at L5-S1 with revision of instrumentation in December 2018.  Since that revision surgery the patient reports some improvement in his low back pain however he still has significant difficulty walking and bilateral hip pain worse on the right side.  He also reports paresthesias in the S1 distribution bilaterally and numbness in his toes.  The pain has become worse.  He is taking narcotics on a regular basis.  Takes Percocet 10 mg of Flexeril 10 mg every 8 hr.  He has tried physical therapy without significant pain relief.  He has received a recent right SI joint injection that did not give him pain relief.  He has tried gabapentin in the past without significant relief is wearing a SI joint belt and a back brace every day to help with his pain.  He has some residual sagittal plane imbalance on scoliosis films. He presents today for neurostimulator trial with percutaneous lead placement.       Procedure(s) (LRB):  Trial, Neurostimulator, Spinal Cord Spinal Cord Stimulator Trial using 2 Percutaneous Least at T9-10 (N/A)      Indwelling Lines/Drains at time of discharge:  Lines/Drains/Airways          None          Hospital Course (synopsis of major diagnoses, care, treatment, and services provided during the course of the hospital stay):     Elective surgery, tolerated without complication. Stable for discharge to home.     Consults: None    Significant Diagnostic Studies: Labs:   BMP:   Recent Labs   Lab  09/20/19  0553   *      K 4.1      CO2 22*   BUN 12   CREATININE 1.0   CALCIUM 8.9   , CMP   Recent Labs   Lab 09/20/19  0553      K 4.1      CO2 22*   *   BUN 12   CREATININE 1.0   CALCIUM 8.9   PROT 7.5   ALBUMIN 4.0   BILITOT 0.3   ALKPHOS 75   AST 20   ALT 27   ANIONGAP 11   ESTGFRAFRICA >60.0   EGFRNONAA >60.0   , CBC   Recent Labs   Lab 09/20/19  0553   WBC 6.67   HGB 14.5   HCT 43.7      , INR   Lab Results   Component Value Date    INR 1.0 09/20/2019    INR 1.0 09/20/2019    INR 1.0 11/11/2009   , Lipid Panel   Lab Results   Component Value Date    CHOL 211 (H) 08/30/2016    HDL 40 08/30/2016    LDLCALC 152 (H) 08/30/2016    TRIG 93 08/30/2016    CHOLHDL 5.3 (H) 08/30/2016    and Troponin No results for input(s): TROPONINI in the last 168 hours.  Microbiology: Blood Culture No results found for: LABBLOO  Radiology: X-Ray: CXR: X-Ray Chest 1 View (CXR): No results found for this visit on 09/20/19.    Pending Diagnostic Studies:     None          Final Active Diagnoses:    Diagnosis Date Noted POA    PRINCIPAL PROBLEM:  Spinal stenosis of lumbar region with radiculopathy [M48.061, M54.16] 12/20/2018 Yes    Spinal stenosis [M48.00] 09/20/2019 Yes      Problems Resolved During this Admission:       Discharged Condition: good    Follow Up: Follow up with Dr. Yovany saelh in neurosurgery clinic as indicated. Neurosurgery will arrange your follow up.     Patient Instructions:      Diet Adult Regular     Notify your health care provider if you experience any of the following:  increased confusion or weakness     Notify your health care provider if you experience any of the following:  persistent dizziness, light-headedness, or visual disturbances     Notify your health care provider if you experience any of the following:  worsening rash     Notify your health care provider if you experience any of the following:  severe persistent headache     Notify your health care  provider if you experience any of the following:  difficulty breathing or increased cough     Notify your health care provider if you experience any of the following:  redness, tenderness, or signs of infection (pain, swelling, redness, odor or green/yellow discharge around incision site)     Notify your health care provider if you experience any of the following:  severe uncontrolled pain     Notify your health care provider if you experience any of the following:  persistent nausea and vomiting or diarrhea     Notify your health care provider if you experience any of the following:  temperature >100.4     Leave dressing on - Keep it clean, dry, and intact until clinic visit     Activity as tolerated     Medications:  Reconciled Home Medications:      Medication List      CONTINUE taking these medications    atorvastatin 40 MG tablet  Commonly known as:  LIPITOR  Take 40 mg by mouth once daily.     metFORMIN 500 MG (MOD) 24 hr tablet  Commonly known as:  GLUMETZA  Take 500 mg by mouth 2 (two) times daily.     olmesartan-hydrochlorothiazide 20-12.5 mg per tablet  Commonly known as:  BENICAR HCT  Take 1 tablet by mouth once daily.        STOP taking these medications    aspirin 81 MG Chew        ASK your doctor about these medications    oxyCODONE-acetaminophen  mg per tablet  Commonly known as:  PERCOCET  Take 1 tablet by mouth every 8 (eight) hours as needed for Pain. Medically necessary            Time spent on the discharge of patient: 30 minutes    Ernesto Pina MD  Neurosurgery  [facility]

## 2019-09-20 NOTE — DISCHARGE INSTRUCTIONS
--Patient stable for discharge to Home    --Please take prescriptions as detailed in medication list; continue to hold Aspirin.     --All questions/concerns addressed and answered    --Please followup with neurosurgery clinic in as indicated with Dr. Yovany saleh; to be arranged by Neurosurgery Clinic     --Please call immediately for any new onset nausea/vomiting/fever/chills, wound breakdown, numbness/tingling/weakness    Wound Care Instructions:  -Keep all wounds clean, dry, and open to air.  -Use your battery as indicated to gauge your response to spinal cord stimulator  -Maintain dressing over exposed leads, as these are anchored to your skin  -Caution with excessive movements involving the lower back that may cause trauma to the skin  -Do not apply creams or ointments to the wound.  -No driving while on narcotic pain medications  -Call Neurosurgery if the wound opens, drains, or becomes red.      Recovery After Procedural Sedation (Adult)  You have been given medicine by vein to make you sleep during your surgery. This may have included both a pain medicine and sleeping medicine. Most of the effects have worn off. But you may still have some drowsiness for the next 6 to 8 hours.  Home care  Follow these guidelines when you get home:  · For the next 8 hours, you should be watched by a responsible adult. This person should make sure your condition is not getting worse.  · Don't drink any alcohol for the next 24 hours.  · Don't drive, operate dangerous machinery, or make important business or personal decisions during the next 24 hours.  Note: Your healthcare provider may tell you not to take any medicine by mouth for pain or sleep in the next 4 hours. These medicines may react with the medicines you were given in the hospital. This could cause a much stronger response than usual.  Follow-up care  Follow up with your healthcare provider if you are not alert and back to your usual level of activity within 12  hours.  When to seek medical advice  Call your healthcare provider right away if any of these occur:  · Drowsiness gets worse  · Weakness or dizziness gets worse  · Repeated vomiting  · You can't be awakened   Date Last Reviewed: 10/18/2016  © 0407-0927 [employer]. [address] [place] [address]. All rights reserved. This information is not intended as a substitute for professional medical care. Always follow your healthcare professional's instructions.    PATIENT INSTRUCTIONS  POST-ANESTHESIA    IMMEDIATELY FOLLOWING SURGERY:  Do not drive or operate machinery for the first twenty four hours after surgery.  Do not make any important decisions for twenty four hours after surgery or while taking narcotic pain medications or sedatives.  If you develop intractable nausea and vomiting or a severe headache please notify your doctor immediately.    FOLLOW-UP:  Please make an appointment with your surgeon as instructed. You do not need to follow up with anesthesia unless specifically instructed to do so.    WOUND CARE INSTRUCTIONS (if applicable):  Keep a dry clean dressing on the anesthesia/puncture wound site if there is drainage.  Once the wound has quit draining you may leave it open to air.  Generally you should leave the bandage intact for twenty four hours unless there is drainage.  If the epidural site drains for more than 36-48 hours please call the anesthesia department.    QUESTIONS?:  Please feel free to call your physician or the hospital  if you have any questions, and they will be happy to assist you.       [facility] Anesthesia Department  [address] [facility]  [phone]

## 2019-09-20 NOTE — OP NOTE
DATE OF PROCEDURE: 09/20/2019      PREOPERATIVE DIAGNOSES:  1.  Status post lumbar fusion  2.  Chronic pain syndrome  3.  Failed back surgery syndrome      POSTOPERATIVE DIAGNOSES:  1.  Status post lumbar fusion  2.  Chronic pain syndrome  3.  Failed back surgery syndrome      NAME OF OPERATION:      1. Successful percutaneous placement of two Nevro epidural lead from T8-9 to T10-11 for SCS trial   2. Fluoroscopic localization.      PRIMARY SURGEON: Ezequiel Pedroza MD     ASSISTANT SURGEON: Ernesto Pina MD (RES)            INDICATION: This is a very pleasant 59 y.o. male, who had prior L4-S1 fusion.  He is complaining chronic bilateral buttock/ hip pain worse on the right side and low back pain.  His pain has been refractory to conservative management.  He has developed narcotic dependence.   Risks, benefits, alternatives and limitation were discussed with the patient and her family. The risks bleeding, infections, hardware failure and subsidence that requires reoperation, as well as hardware malpositioning. The patient wanted to proceed, and the consent was obtained.      DESCRIPTION OF PROCEDURES: The patient was intubated under general anesthesia    and was placed prone on the Rayray table. Using fluoroscopy, two 2 mm incisions were planned at the level of the pedicles of L2. All pressure points were carefully padded. The patient was prepped and draped in a typical sterile fashion. The incisions were made with a #15 blade after injection of lidocaine and marcaine.     Subcutaneous tissue hemostasis was completed. Using a Jamshidi needle we entered the epidural space at  T11-12  bilaterally and the left epidural lead was inserted up to T7-8 and the right lead was inserted up to T8-9.  The T9-10 level was well covered.      The wounds were closed with 3.0 Vinyl. The leads was fixated to the skin with 3.0 Vinyl. Blood loss 1 cc. No complication.

## 2019-09-20 NOTE — TRANSFER OF CARE
"Anesthesia Transfer of Care Note    Patient: Ezekiel Noyola    Procedure(s) Performed: Procedure(s) (LRB):  Trial, Neurostimulator, Spinal Cord Spinal Cord Stimulator Trial using 2 Percutaneous Least at T9-10 (N/A)    Patient location: PACU    Anesthesia Type: general    Transport from OR: Transported from OR on room air with adequate spontaneous ventilation    Post pain: adequate analgesia    Post assessment: tolerated procedure well and no apparent anesthetic complications    Post vital signs: stable    Level of consciousness: awake and alert    Complications: none    Transfer of care protocol was followed      Last vitals:   Visit Vitals  /82   Pulse 88   Temp 36.3 °C (97.3 °F) (Temporal)   Resp 10   Ht 6' 2" (1.88 m)   Wt 102.1 kg (225 lb)   SpO2 99%   BMI 28.89 kg/m²     "

## 2019-09-24 ENCOUNTER — TELEPHONE (OUTPATIENT)
Dept: NEUROSURGERY | Facility: CLINIC | Age: 59
End: 2019-09-24

## 2019-09-24 NOTE — TELEPHONE ENCOUNTER
----- Message from Ban Caballero sent at 9/24/2019  8:18 AM CDT -----  Contact: PT   Pt would like nurse to contact him in regards to appointment confirmation, he is scheduled for 09/25 but received a letter stating his appointment is scheduled for 09/27.    Please contact the patient to confirm this information .     He can be reached at [phone]

## 2019-09-24 NOTE — TELEPHONE ENCOUNTER
----- Message from Zee Hicks sent at 9/24/2019  8:36 AM CDT -----  Contact: self / [phone]  He will be at his appointment. Please advise

## 2019-09-25 ENCOUNTER — OFFICE VISIT (OUTPATIENT)
Dept: NEUROSURGERY | Facility: CLINIC | Age: 59
End: 2019-09-25
Payer: COMMERCIAL

## 2019-09-25 ENCOUNTER — HOSPITAL ENCOUNTER (OUTPATIENT)
Dept: RADIOLOGY | Facility: HOSPITAL | Age: 59
Discharge: HOME | End: 2019-09-25
Attending: NEUROLOGICAL SURGERY
Payer: COMMERCIAL

## 2019-09-25 VITALS
WEIGHT: 225.06 LBS | SYSTOLIC BLOOD PRESSURE: 144 MMHG | BODY MASS INDEX: 28.88 KG/M2 | HEIGHT: 74 IN | TEMPERATURE: 98 F | DIASTOLIC BLOOD PRESSURE: 82 MMHG

## 2019-09-25 DIAGNOSIS — Z51.89 VISIT FOR WOUND CHECK: Primary | ICD-10-CM

## 2019-09-25 DIAGNOSIS — Z96.89 S/P INSERTION OF SPINAL CORD STIMULATOR: ICD-10-CM

## 2019-09-25 PROCEDURE — 99999 PR PBB SHADOW E&M-EST. PATIENT-LVL III: ICD-10-PCS | Mod: PBBFAC,,, | Performed by: PHYSICIAN ASSISTANT

## 2019-09-25 PROCEDURE — 72080 X-RAY EXAM THORACOLMB 2/> VW: CPT | Mod: 26,,, | Performed by: RADIOLOGY

## 2019-09-25 PROCEDURE — 72080 X-RAY EXAM THORACOLMB 2/> VW: CPT | Mod: TC,PN

## 2019-09-25 PROCEDURE — 99024 PR POST-OP FOLLOW-UP VISIT: ICD-10-PCS | Mod: S$GLB,,, | Performed by: PHYSICIAN ASSISTANT

## 2019-09-25 PROCEDURE — 99999 PR PBB SHADOW E&M-EST. PATIENT-LVL III: CPT | Mod: PBBFAC,,, | Performed by: PHYSICIAN ASSISTANT

## 2019-09-25 PROCEDURE — 99024 POSTOP FOLLOW-UP VISIT: CPT | Mod: S$GLB,,, | Performed by: PHYSICIAN ASSISTANT

## 2019-09-25 PROCEDURE — 72080 XR THORACOLUMBAR SPINE AP LATERAL: ICD-10-PCS | Mod: 26,,, | Performed by: RADIOLOGY

## 2019-09-25 RX ORDER — LIDOCAINE AND PRILOCAINE 25; 25 MG/G; MG/G
CREAM TOPICAL
Refills: 3 | COMMUNITY
Start: 2019-09-16 | End: 2019-11-20

## 2019-09-25 RX ORDER — DICLOFENAC SODIUM 10 MG/G
GEL TOPICAL
Refills: 3 | COMMUNITY
Start: 2019-09-16 | End: 2019-11-20

## 2019-09-25 RX ORDER — GEL BASE NO.184
GEL (GRAM) TOPICAL
Refills: 3 | COMMUNITY
Start: 2019-09-16 | End: 2019-11-20

## 2019-09-25 NOTE — PROGRESS NOTES
Neurosurgery Wound Check Progress Note    HPI  Ezekiel Noyola is 59 y.o. s/p right hip fusion in 1970s, prior L4-S1 fusion with L5-S1 revision of instrumentation December 2018 and continues to complain of chronic bilateral buttock/ hip pain worse on the right side and low back pain who underwent Nevro epidural lead spinal cord stimulator trial at T8-9 and T10-11 with Dr. Pedroza on 09/20/2019.  Patient presents today for spinal cord trial removal.  Since spinal cord stimulator trial placed, patient reports improvement in bilateral leg shooting pain.  However, he has worsening back and bilateral hip pain with the trial.  He had decreased physical activity and functional activity secondary to worsening pain.  Continues to take Percocet 10 t.i.d. p.r.n. severe pain.  Denies any fever, chills, or incisional issues.  He would like to have trial removed and does not wish to have permanent placement.                    EXAM  Vitals:    09/25/19 1340   BP: (!) 144/82   Temp: 98.4 °F (36.9 °C)     Body mass index is 28.9 kg/m².      General: well developed, well nourished. no acute distress.  Appears older than stated age.  Neurologic: Awake, alert and oriented x3. Thought content appropriate.  Head: normocephalic, atraumatic    GCS: Motor: 6/Verbal: 5/Eyes: 4 GCS Total: 15  Cranial nerves: face symmetric, tongue midline, pupils equal, round, reactive to light with accomodation, extraocular muscles intact. CN II-XII grossly intact.    Sensory: response to light touch throughout  Motor Strength: Moves all extremities spontaneously with good tone. Full strength upper and lower extremities. Right hip is fused to his femur so limited right hip ROM.    No focal numbness or weakness   No midline or paraspinal tenderness to palpation  Gait is slow and antalgic at baseline      Heart: RRR, no cyanosis, pallor, or edema.    Lungs: normal respiratory effort  Abdomen: soft, non-tender and symmetric  Extremities: warm with no cyanosis,  edema, or clubbing  Skin: warm, dry and intact. No visible rashes or lesions.        Lumbar Surgical incision:  C/D/I without any signs of infection.  Incision is healing well   No erythema, warmth, edema, TTP.  No drainage.  Wound edges are well approximated.    scs trial leads intact and removed without difficulty.          IMAGING/LABS  Thoracolumbar x-rays dated 09/25/2019 personally reviewed and compared to previous imaging   Stable L4-S1 fusion with L5-S1 interbody.      ASSESSMENT/PLAN    59 y.o. s/p right hip fusion in 1970s, prior L4-S1 fusion with L5-S1 revision of instrumentation December 2018 and continues to complain of chronic bilateral buttock/ hip pain worse on the right side and low back pain who underwent Nevro epidural lead spinal cord stimulator trial at T8-9 and T10-11 with Dr. Pedroza on 09/20/2019.     -percutaneous spinal cord stimulator lead removed without any difficulties.  Not a good candidate for spinal cord stimulator permanent placement since have worsening back/bilateral hip pain and decreased functional activity with the SCS trial. I have reiterated wound care, activity restrictions, and follow up appts as below.       -Do not submerge incision for another 6 weeks. Pat the incision dry after your shower.  -Keep incision open to air. Turn q2h to promote appropriate wound healing.n.   -LSO brace when OOB.   -Increase ambulation. No heavy lifting >10lbs.   No over bending or twisting at incisional site.  Fall precautions.  -Patient has follow up with Dr. Pedroza in 6 weeks.    All questions were answered. Patient was encouraged to call clinic with any future concerns prior to follow up appt.     Please call with any questions.    Wes Humphries PA-C  Neurosurgery      Note dictated with voice recognition software, please excuse any grammatical errors.

## 2019-10-11 ENCOUNTER — TELEPHONE (OUTPATIENT)
Dept: NEUROSURGERY | Facility: CLINIC | Age: 59
End: 2019-10-11

## 2019-10-11 NOTE — TELEPHONE ENCOUNTER
----- Message from Nichelle Meza sent at 10/11/2019  9:44 AM CDT -----  Contact: Self            Name of Who is Calling: MARILYN DWYER [[id number]]      What is the request in detail: Pt would like a call back to discuss getting a sooner appt. Pt's appt is scheduled for 11/6. Please contact to further discuss and advise.        Can the clinic reply by [facility]: N      What [facility]: [phone]

## 2019-10-14 ENCOUNTER — TELEPHONE (OUTPATIENT)
Dept: NEUROSURGERY | Facility: CLINIC | Age: 59
End: 2019-10-14

## 2019-10-14 NOTE — TELEPHONE ENCOUNTER
----- Message from Victorina Long sent at 10/14/2019  1:38 PM CDT -----  Contact: self  Type:  RX Refill Request    Who Called: patient states need to speak with Ms Garg   Refill or New Rx refill  RX Name and Strength Oxycodone  How is the patient currently taking it? (ex. 1XDay):  Is this a 30 day or 90 day RX:30  Preferred Pharmacy with phone number:[facility] or Mail Order:  Ordering Provider:Dr Pedroza  Would the patient rather a call back or a response via [facility]? call  Best Call Back Number:[phone]  Additional Information:

## 2019-10-15 RX ORDER — OXYCODONE AND ACETAMINOPHEN 10; 325 MG/1; MG/1
1 TABLET ORAL EVERY 8 HOURS PRN
Qty: 90 TABLET | Refills: 0 | Status: SHIPPED | OUTPATIENT
Start: 2019-10-15 | End: 2019-11-14 | Stop reason: SDUPTHER

## 2019-10-28 ENCOUNTER — OFFICE VISIT (OUTPATIENT)
Dept: NEUROSURGERY | Facility: CLINIC | Age: 59
End: 2019-10-28
Payer: COMMERCIAL

## 2019-10-28 VITALS — SYSTOLIC BLOOD PRESSURE: 135 MMHG | HEART RATE: 83 BPM | DIASTOLIC BLOOD PRESSURE: 78 MMHG

## 2019-10-28 DIAGNOSIS — G89.4 CHRONIC PAIN SYNDROME: Primary | ICD-10-CM

## 2019-10-28 DIAGNOSIS — Z98.1 S/P LUMBAR FUSION: ICD-10-CM

## 2019-10-28 PROCEDURE — 99999 PR PBB SHADOW E&M-EST. PATIENT-LVL III: ICD-10-PCS | Mod: PBBFAC,,, | Performed by: NEUROLOGICAL SURGERY

## 2019-10-28 PROCEDURE — 99024 PR POST-OP FOLLOW-UP VISIT: ICD-10-PCS | Mod: S$GLB,,, | Performed by: NEUROLOGICAL SURGERY

## 2019-10-28 PROCEDURE — 99999 PR PBB SHADOW E&M-EST. PATIENT-LVL III: CPT | Mod: PBBFAC,,, | Performed by: NEUROLOGICAL SURGERY

## 2019-10-28 PROCEDURE — 99024 POSTOP FOLLOW-UP VISIT: CPT | Mod: S$GLB,,, | Performed by: NEUROLOGICAL SURGERY

## 2019-10-28 RX ORDER — ASPIRIN 81 MG/1
81 TABLET ORAL DAILY
Status: ON HOLD | COMMUNITY
End: 2021-07-27 | Stop reason: HOSPADM

## 2019-10-28 NOTE — PROGRESS NOTES
NEUROSURGICAL POST-OPERATIVE PROGRESS NOTE    DATE OF SERVICE:  10/28/2019      ATTENDING PHYSICIAN:  Ezequiel Pedroza MD    SUBJECTIVE:    INTERIM HISTORY:    This is a very pleasant 59 y.o. y.o. male, who is status 6 weeks post-op SCS trial with Nevro system.  Patient reports that he did not like the stimulation stimulation was giving pain.  He had partial pain relief in his right leg but overall his pain was worse.  He is interested in physical therapy.     Low Back Pain Scale  R Low Back-Pain Score: 8  R Low Back-Pain Intensity: Pain killers give moderate relief from pain  R Low Back-Pain Score: I can look after myself normally but it causes extra pain  Low Back-Lifting: I can only lift very light weights   Low Back-Walking: Pain prevents me walking more than .25 mile   Low Back-Sitting: Pain prevents me from sitting more than than 10 minutes   Low Back-Standing: I cannot stand for longer than 30 mins without increasing pain   Low Back-Sleeping: Because of pain my normal nights sleep is reduced by less than one quarter   Low Back-Social Life: I have hardly any social life because of the pain   Low Back-Traveling: Pain restricts all forms of travel   Low Back-Changing Degree of Pain: My pain is gradually worsening         OBJECTIVE:    PHYSICAL EXAMINATION:     Neurosurgery Physical Exam    Ortho Exam    Neurologic Exam    Wound has healed.    DIAGNOSTIC DATA:    NA    ASSESMENT:    This is a 59 y.o. male who is s/p six weeks failed spinal cord stimulation trial using the Nevro system    Problem List Items Addressed This Visit     None      Visit Diagnoses     Chronic pain syndrome    -  Primary    Relevant Orders    Ambulatory consult to Pain Clinic    Ambulatory Consult to [facility]    S/P lumbar fusion        Relevant Orders    Ambulatory consult to Pain Clinic    Ambulatory Consult to [facility]          PLAN:    I will refer him to Dr. Purvis for pharmacologic management of chronic pain,  functional rehabilitation program  I will refer him back and [facility] with PM[facility] taraal  All questions answered        Ezequiel Pedroza MD  Pager: [phone]

## 2019-10-31 ENCOUNTER — TELEPHONE (OUTPATIENT)
Dept: SPINE | Facility: CLINIC | Age: 59
End: 2019-10-31

## 2019-10-31 DIAGNOSIS — M54.9 BACK PAIN, UNSPECIFIED BACK LOCATION, UNSPECIFIED BACK PAIN LATERALITY, UNSPECIFIED CHRONICITY: Primary | ICD-10-CM

## 2019-11-12 ENCOUNTER — TELEPHONE (OUTPATIENT)
Dept: NEUROSURGERY | Facility: CLINIC | Age: 59
End: 2019-11-12

## 2019-11-12 NOTE — TELEPHONE ENCOUNTER
Spoke with Mr Dennys, pt was informed [facility] will be able to follow up with his refill request.

## 2019-11-12 NOTE — PROGRESS NOTES
Subjective:      Patient ID: Ezekiel Noyola is a 59 y.o. male.    Chief Complaint: Back Pain; Mid-back Pain; Neck Pain; and Leg Pain (Bilateral)    Mr Noyola is a 58 yo male sent in consultation by Dr. Pedroza for evaluation of low back pain s/p fusion 12/2018 with revision of prior fusion and failed spinal cord stimulator trial.  In 2005 he had his first back surgery for radiculopathy, in 2015 he had first fusion, and then 2018 he had revision of fusion.  He has tried PT with no relief.  He has back pain for many years, but more severe since 2013.  The pain is lower and midback.  The pain radiates down the back of the legs to the bottom of the feet.  The pain is like electricity.  He feels like worms crawling in legs and and arms and part of back.  The pain is worse with sitting.  He feels better standing but still hurts.  The pain is an achy pain.  He has some tingling in hands, and the toes on both feet.  He has some numbness in the right hip and across the back.  He did pool therapy but that made the pain worse and he would be in bed for a couple of days.  He has spent most of his time lying down since 2013.  He spends 12 hours in bed.  He does try to move around.  He has not been to traditional PT for a long time.  Pain is 6/10 now, worst 10/10 in the morning in bed, best 6/10 after taking pain meds.      X-ray lumbar 11/2019  Lumbosacral fusion hardware spanning L4-S1 with interbody spacer at L5-S1 is again noted.  No evidence of hardware complication.  No acute fracture.  Lumbar alignment is relatively well maintained.  Mild degenerative changes at proximal lumbar levels.    Impression      Lumbosacral fusion without evidence of hardware complication or acute fracture.      X-ray lumbar 6/2019  Status post L4 through S1 instrumented fusion.  Hardware is intact, without evidence for loosening.    Alignment: Alignment is maintained.    Vertebrae: Vertebral body heights are maintained.  No suspicious appearing lytic  or blastic lesions.    Discs and facets: Mild loss of disc height noted at unfused levels.    Miscellaneous: Partially visualized right iliac hardware noted.    Impression      As above.    Ct lumbar 1/2019  Interval removal of previously applied posterior fusion hardware at the L3 through S1 level with revision.  There is now posterior fusion at the L4 through S1 levels with L5-S1 arthrodesis and L4 and L5 laminectomies.  Soft tissue density with scattered calcifications in the operative bed.  Pedicle screws are well seated with the left S1 pedicle screw extending approximately 9 mm past the cortical margin into the presacral tissues.  Bilateral lucencies in the L3 vertebral body at the location of previously placed pedicle screws.  The vertebral body heights appear well-maintained.  There is no evidence of fracture or osseous lytic or blastic process.  There is intervertebral disc space narrowing at the L4-L5 level with remainder of the disc heights well-maintained.  Focal degenerative changes are described below.    T12-L1: There is mild facet arthropathy.  There is no neural foraminal narrowing or central canal stenosis.    L1-L2: There is mild facet arthropathy. There is no neural foraminal narrowing or central canal stenosis.    L2-L3: There is mild posterior disc bulge, and facet arthropathy resulting in mild neural foraminal narrowing.  There is mild central canal stenosis.    L3-L4: Bilateral facet arthropathy with mild right and moderate left neural foraminal narrowing.  No central canal stenosis.    L4-L5: There is no posterior disc bulge.  Operative change of laminectomy with mild bilateral neural foraminal narrowing.  No central canal stenosis.    L5-S1: There is no posterior disc bulge.  Operative change of laminectomy with bilateral neural foraminal narrowing.  No central canal stenosis.    The spinal canal otherwise appears unremarkable.  No intradural abnormalities are identified.  There is  unchanged sclerotic focus in the right ilium likely a bone island, and sclerotic changes about the sacroiliac joints bilaterally.  Right psoas muscle atrophy.  Surrounding soft tissues are otherwise unremarkable.    Impression      Operative change of L3-S1 PLIF revision, now involving the L4 through S1 levels with L5-S1 arthrodesis, and L4 and L5 laminectomies.  Of note, left S1 pedicle screw extends past the cortical margin as above.    Degenerative changes without significant spinal canal stenosis.  There is neural foraminal narrowing.    Atrophic right psoas    Additional findings as detailed above.    Ct cervical 2018  There is slight straightening of the normal cervical lordosis.  The cervical vertebral body heights and contours are within normal limits without evidence for acute fracture or subluxation.  Intrathecal myelographic contrast identified throughout the cervical canal extending to the visualized subarachnoid skullbase without evidence for myelographic block.    There is slight developmental slender caliber of the central canal    C2/C3: No significant disc bulge with slight degenerative narrowing of the cervical canal without significant bony neural foraminal stenosis    C3/C4: No significant disc bulge, central canal or bony neural foraminal stenosis.    C4/C5: Trace disc bulge with facet joint arthropathy without significant central canal or bony neural foraminal stenosis.    C5/C6: Small disc bulge with uncovertebral joint hypertrophy and facet joint arthropathy without significant central canal or bony neural foraminal stenosis.    C6/C7: No significant disc bulge or central canal stenosis.  There is uncovertebral joint hypertrophy and facet joint arthropathy with mild left neural foraminal stenosis.    C7/T1: No significant disc bulge, central canal or neural foraminal stenosis.    No significant opacification visualized lung apices.    Impression      There is slight developmental slender  caliber of the central canal diffusely.    Superimposed mild scattered degenerative changes most pronounced at C6/C7 with uncovertebral joint hypertrophy and facet joint arthropathy with mild left neural foraminal stenosis.    There is no significant central canal stenosis or evidence of myelographic block.    Please see above for additional details.      Past Medical History:  No date: Diabetes mellitus  No date: Gout  No date: Hepatitis C  No date: Hypercholesteremia  No date: Hypertension  No date: Positive cardiac stress test  No date: Positive D dimer    Past Surgical History:  No date: BACK SURGERY  No date: FRACTURE SURGERY; Left      Comment:  leg  12/20/2018: FUSION OF SPINE WITH INSTRUMENTATION; N/A      Comment:  Procedure: FUSION, SPINE, WITH INSTRUMENTATION Removal                of spinal hardware, Bilateral Jamison-White Osteotomy                L5-S1, L5-S1 Interbody Arthrodesis, L4-S1 Segmental                Instrumentation with Posterior Lateral Arthrodesis;                 Surgeon: Ezequiel Pedroza MD;  Location: [facility] OR;                 Service: Neurosurgery;  Laterality: N/A;  Procedure:                Removal of spinal hardware, Revision of Posterolateral                fusion from L3-S1, L5-S1 In  No date: HIP SURGERY; Right      Comment:  hip fusion  3/22/2019: INJECTION OF STEROID; Right      Comment:  Procedure: INJECTION, STEROID Right SI Joint Block and                Steroid Injection;  Surgeon: Ezequiel Pedroza MD;                 Location: [facility] OR;  Service: Neurosurgery;  Laterality:                Right;  Procedure: Right SI Joint Block and Steroid                InjectionSurgery Time: 15 MinLOS:0Anesthesia:                GeneralRadiology: C-ArmBed: Regular BedPosition:                Prone  No date: KNEE ARTHROSCOPY  No date: LAMINECTOMY      Comment:  Lumbar  11/5/2018: MYELOGRAPHY; N/A      Comment:  Procedure: Myelogram;  Surgeon: [facility]                Provider;   [facility];  Service:                Radiology;  Laterality: N/A;  No date: right wrist surgery  9/20/2019: TRIAL OF SPINAL CORD NERVE STIMULATOR; N/A      Comment:  Procedure: Trial, Neurostimulator, Spinal Cord Spinal                Cord Stimulator Trial using 2 Percutaneous Least at                T9-10;  Surgeon: Ezequiel Pedroza MD;  [facility];  Service: Neurosurgery;  Laterality: N/A;                 Procedure: Spinal Cord Stimulator Trial using 2                Percutaneous Least at T9-10Surgery Time: 1 HrLOS:                0Anesthesia: GeneralRadiology: C-armBed: Rayray Seo                PosterPosition: ProneEq    Review of patient's family history indicates:  Problem: Diabetes      Relation: Sister          Age of Onset: (Not Specified)  Problem: Hypertension      Relation: Sister          Age of Onset: (Not Specified)  Problem: Diabetes      Relation: Brother          Age of Onset: (Not Specified)  Problem: Hypertension      Relation: Brother          Age of Onset: (Not Specified)      Social History    Socioeconomic History      Marital status:       Spouse name: Not on file      Number of children: Not on file      Years of education: Not on file      Highest education level: Not on file    Occupational History      Not on file    Social Needs      Financial resource strain: Not on file      Food insecurity:        Worry: Not on file        Inability: Not on file      Transportation needs:        Medical: Not on file        Non-medical: Not on file    Tobacco Use      Smoking status: Former Smoker    Substance and Sexual Activity      Alcohol use: No      Drug use: Yes        Types: Marijuana      Sexual activity: Not on file    Lifestyle      Physical activity:        Days per week: Not on file        Minutes per session: Not on file      Stress: Not on file    Relationships      Social connections:        Talks on phone: Not on file        Gets  "together: Not on file        Attends Hindu service: Not on file        Active member of club or organization: Not on file        Attends meetings of clubs or organizations: Not on file        Relationship status: Not on file    Other Topics      Concerns:        Not on file    Social History Narrative      Not on file      Current Outpatient Medications:  aspirin (ECOTRIN) 81 MG EC tablet, Take 81 mg by mouth once daily., Disp: , Rfl:   atorvastatin (LIPITOR) 40 MG tablet, Take 40 mg by mouth once daily., Disp: , Rfl:   diclofenac sodium (VOLTAREN) 1 % Gel, APPLY 2.5 GRAMS TO THE AFFECTED AREA 3-4 TIMES DAILY STARTING 2 WEEKS POST SURGERY., Disp: , Rfl: 3  lidocaine-prilocaine (EMLA) cream, APPLY 1-2 GRAMS TO THE AFFECTED AREAS 3-4 TIMES DAILY STARTING 2 WEEKS POST SURGERY., Disp: , Rfl: 3  metFORMIN (GLUMETZA) 500 MG (MOD) 24 hr tablet, Take 500 mg by mouth 2 (two) times daily., Disp: , Rfl:   olmesartan-hydrochlorothiazide (BENICAR HCT) 20-12.5 mg per tablet, Take 1 tablet by mouth once daily., Disp: , Rfl:   oxyCODONE-acetaminophen (PERCOCET)  mg per tablet, Take 1 tablet by mouth every 8 (eight) hours as needed for Pain. Medically necessary, Disp: 90 tablet, Rfl: 0  SANARE ADV SCAR THERAPY BASE Gel, APPLY 1/2 GRAM (1 PUMP) TO AFFECTED AREAS TWICE DAILY STARTING 4 WEEKS POST SURGERY., Disp: , Rfl: 3    No current facility-administered medications for this visit.       Review of patient's allergies indicates:   -- Lactose    -- Gabapentin -- Itching    --  C/O med causing bump like rash on lower trunk   -- Penicillins -- Rash    --  Pt states a "a couple of years ago" he took             penicillin and broke out in "red spots" with no             itching or difficulty breathing        Review of Systems   Constitution: Negative for weight gain and weight loss.   Cardiovascular: Negative for chest pain.   Respiratory: Positive for shortness of breath.    Musculoskeletal: Positive for back pain, myalgias " and neck pain. Negative for joint pain and joint swelling.   Gastrointestinal: Negative for abdominal pain, bowel incontinence, nausea and vomiting.   Genitourinary: Negative for bladder incontinence.   Neurological: Positive for numbness and paresthesias.         Objective:        General: Ezekiel is well-developed, well-nourished, appears stated age, in no acute distress, alert and oriented to time, place and person.     General    Vitals reviewed.  Constitutional: He is oriented to person, place, and time. He appears well-developed and well-nourished.   HENT:   Head: Normocephalic and atraumatic.   Pulmonary/Chest: Effort normal.   Neurological: He is alert and oriented to person, place, and time.   Psychiatric: He has a normal mood and affect. His behavior is normal. Judgment and thought content normal.     General Musculoskeletal Exam   Gait: antalgic and short leg (right leg shorter prior hip fusion)     Back (L-Spine & T-Spine) / Neck (C-Spine) Exam     Tenderness Right paramedian tenderness of the Sacrum and Lower L-Spine. Left paramedian tenderness of the Lower L-Spine and Sacrum.     Back (L-Spine & T-Spine) Range of Motion   Extension: 0   Flexion: 30   Lateral bend right: 0   Lateral bend left: 0   Back rotation left: did not want to twist.     Spinal Sensation   Right Side Sensation  C-Spine Level: normal   L-Spine Level: normal  S-Spine Level: normal  Left Side Sensation  C-Spine Level: normal  L-Spine Level: normal  S-Spine Level: normal    Back (L-Spine & T-Spine) Tests   Right Side Tests  Straight leg raise:      Sitting SLR: > 70 degrees      Left Side Tests  Straight leg raise:     Sitting SLR: > 70 degrees          Other He has no scoliosis .  Spinal Kyphosis:  Absent      Muscle Strength   Right Upper Extremity   Biceps: 5/5/5   Deltoid:  5/5  Triceps:  5/5  Wrist extension: 5/5/5   Finger Flexors:  5/5  Left Upper Extremity  Biceps: 5/5/5   Deltoid:  5/5  Triceps:  5/5  Wrist extension: 5/5/5    Finger Flexors:  5/5  Right Lower Extremity   Quadriceps:  5/5   Anterior tibial:  5/5/5  EHL:  5/5  Left Lower Extremity   Hip Flexion: 5/5   Quadriceps:  5/5   Anterior tibial:  5/5/5   EHL:  5/5    Reflexes     Left Side  Biceps:  2+  Triceps:  2+  Brachioradialis:  2+  Quadriceps:  2+  Achilles:  2+  Left Schulz's Sign:  Absent  Babinski Sign:  absent    Right Side   Biceps:  2+  Triceps:  2+  Brachioradialis:  2+  Quadriceps:  2+  Achilles:  2+  Right Schulz's Sign:  absent  Babinski Sign:  absent    Vascular Exam     Right Pulses        Carotid:                  2+    Left Pulses        Carotid:                  2+              Assessment:       1. Chronic bilateral low back pain with bilateral sciatica    2. History of back surgery    3. Hx of surgical fusion joint           Plan:       Orders Placed This Encounter    Ambulatory referral to [facility]    Ambulatory referral to Psychiatry    Ambulatory referral to Physical Therapy    Ambulatory referral to Occupational Therapy      1. We discussed back pain and the nature of back pain.  We discussed that it is not one thing that causes the pain but an accumulation of multiple things that we do.  We discussed thee importance of moving.  He does not want to range lower bck because of the pain.  We discussed moving is important.  We discussed dont move get weaker and stiffer and hurt more.  He does not want to  2. We discussed posture sitting and the importance of trying to sit better.  He has a hard time sitting straight due to right hip fusion.  We discussed going to see ortho to consider VANDANA ,if he is a candidate he is not interested  3. We discussed the benefits of therapy and exercise and continuing to move. We discussed functional restoration vs healthy back.  With unwillingness to do ROM, I do not think healthy back would work.  I do think he needs to lose some of his fear of moving.    4. He asks about meds, discussed I will not  be filling his pain meds  5. RTC PRN    More than 50% of the total time  of 45 minutes was spent face to face in counseling on diagnosis and treatment options. I also counseled patient  on common and most usual side effect of prescribed medications.  I reviewed Primary care , and other specialty's notes to better coordinate patient's care. All questions were answered, and patient voiced understanding.     A consultation note will be sent to Dr. Pedroza through epic.  Thank you for the consult    Follow-up: Follow up if symptoms worsen or fail to improve. If there are any questions prior to this, the patient was instructed to contact the office.

## 2019-11-12 NOTE — TELEPHONE ENCOUNTER
----- Message from Dolores Rodriguez sent at 11/12/2019  9:31 AM CST -----  Contact: [phone]/self  Patient called in requesting to speak with you. Patient prefers to speak with a nurse. Please advise.

## 2019-11-13 ENCOUNTER — TELEPHONE (OUTPATIENT)
Dept: NEUROSURGERY | Facility: CLINIC | Age: 59
End: 2019-11-13

## 2019-11-13 ENCOUNTER — HOSPITAL ENCOUNTER (OUTPATIENT)
Dept: RADIOLOGY | Facility: OTHER | Age: 59
Discharge: HOME | End: 2019-11-13
Attending: PHYSICIAN ASSISTANT
Payer: COMMERCIAL

## 2019-11-13 ENCOUNTER — OFFICE VISIT (OUTPATIENT)
Dept: SPINE | Facility: CLINIC | Age: 59
End: 2019-11-13
Attending: PHYSICAL MEDICINE & REHABILITATION
Payer: COMMERCIAL

## 2019-11-13 VITALS
HEART RATE: 71 BPM | HEIGHT: 74 IN | BODY MASS INDEX: 28.88 KG/M2 | WEIGHT: 225.06 LBS | DIASTOLIC BLOOD PRESSURE: 75 MMHG | SYSTOLIC BLOOD PRESSURE: 120 MMHG

## 2019-11-13 DIAGNOSIS — M54.9 BACK PAIN, UNSPECIFIED BACK LOCATION, UNSPECIFIED BACK PAIN LATERALITY, UNSPECIFIED CHRONICITY: ICD-10-CM

## 2019-11-13 DIAGNOSIS — Z98.890 HISTORY OF BACK SURGERY: ICD-10-CM

## 2019-11-13 DIAGNOSIS — Z98.1 HX OF SURGICAL FUSION JOINT: ICD-10-CM

## 2019-11-13 DIAGNOSIS — G89.29 CHRONIC BILATERAL LOW BACK PAIN WITH BILATERAL SCIATICA: Primary | ICD-10-CM

## 2019-11-13 DIAGNOSIS — M54.41 CHRONIC BILATERAL LOW BACK PAIN WITH BILATERAL SCIATICA: Primary | ICD-10-CM

## 2019-11-13 DIAGNOSIS — M54.42 CHRONIC BILATERAL LOW BACK PAIN WITH BILATERAL SCIATICA: Primary | ICD-10-CM

## 2019-11-13 PROCEDURE — 99999 PR PBB SHADOW E&M-EST. PATIENT-LVL IV: ICD-10-PCS | Mod: PBBFAC,,, | Performed by: PHYSICAL MEDICINE & REHABILITATION

## 2019-11-13 PROCEDURE — 99999 PR PBB SHADOW E&M-EST. PATIENT-LVL IV: CPT | Mod: PBBFAC,,, | Performed by: PHYSICAL MEDICINE & REHABILITATION

## 2019-11-13 PROCEDURE — 99214 OFFICE O/P EST MOD 30 MIN: CPT | Mod: S$GLB,,, | Performed by: PHYSICAL MEDICINE & REHABILITATION

## 2019-11-13 PROCEDURE — 99214 PR OFFICE/OUTPT VISIT, EST, LEVL IV, 30-39 MIN: ICD-10-PCS | Mod: S$GLB,,, | Performed by: PHYSICAL MEDICINE & REHABILITATION

## 2019-11-13 PROCEDURE — 72100 X-RAY EXAM L-S SPINE 2/3 VWS: CPT | Mod: 26,,, | Performed by: RADIOLOGY

## 2019-11-13 PROCEDURE — 72100 XR LUMBAR SPINE AP AND LATERAL: ICD-10-PCS | Mod: 26,,, | Performed by: RADIOLOGY

## 2019-11-13 PROCEDURE — 72100 X-RAY EXAM L-S SPINE 2/3 VWS: CPT | Mod: TC,FY

## 2019-11-13 NOTE — TELEPHONE ENCOUNTER
Spoke to the patient he saw back and spine and they will not refill any pain medication. He would like to know if you can send a note to his PCP to take over order his refills on his pain medication.

## 2019-11-13 NOTE — TELEPHONE ENCOUNTER
----- Message from Nilsa Molina sent at 11/13/2019  9:08 AM CST -----  Contact: self, [phone] (M)  Patient requests to speak with you regarding his pain medication he was told you're not giving him anymore. Please advise.

## 2019-11-13 NOTE — LETTER
November 13, 2019      Ezequiel Pedroza MD  [address]  [address]  [facility] [address]           [address]  [address], [address]  [facility] [address]  Phone: [phone]  Fax: [phone]          Patient: Ezekiel Noyola   MR Number: [id number]   YOB: 1960   Date of Visit: 11/13/2019       Dear Dr. Ezequiel Pedroza:    Thank you for referring Ezekiel Noyola to me for evaluation. Attached you will find relevant portions of my assessment and plan of care.    If you have questions, please do not hesitate to call me. I look forward to following Ezekiel Noyola along with you.    Sincerely,    Jane Castillo MD    Enclosure  CC:  No Recipients    If you would like to receive this communication electronically, please contact [email or web address] or [phone] to request more information on [employer] Link access.    For providers and/or their staff who would like to refer a patient to [facility], please contact us through our one-stop-shop provider referral line, [facility], at [phone].    If you feel you have received this communication in error or would no longer like to receive these types of communications, please e-mail [email or web address]

## 2019-11-14 ENCOUNTER — TELEPHONE (OUTPATIENT)
Dept: ORTHOPEDICS | Facility: CLINIC | Age: 59
End: 2019-11-14

## 2019-11-14 RX ORDER — OXYCODONE AND ACETAMINOPHEN 10; 325 MG/1; MG/1
1 TABLET ORAL EVERY 8 HOURS PRN
Qty: 90 TABLET | Refills: 0 | Status: SHIPPED | OUTPATIENT
Start: 2019-11-14 | End: 2020-02-19

## 2019-11-14 NOTE — TELEPHONE ENCOUNTER
Patient PCP is Dionisio Avila. I can fax the letter the fax number is [phone]. Also he would like to know if you will fill his medication this month since it is due on Monday.

## 2019-11-14 NOTE — TELEPHONE ENCOUNTER
----- Message from Quincy Vasquez sent at 11/14/2019 11:11 AM CST -----  Contact: [phone]/self  Patient requesting to speak with you (personal) patient did not care to elaborate  Please call back to assist at [phone]

## 2019-11-14 NOTE — TELEPHONE ENCOUNTER
Letter written and faxed. Spoke to patient instructed pain  meds would be called in this one last time..

## 2019-11-18 ENCOUNTER — TELEPHONE (OUTPATIENT)
Dept: PAIN MEDICINE | Facility: OTHER | Age: 59
End: 2019-11-18

## 2019-11-18 NOTE — TELEPHONE ENCOUNTER
Pt referred to [facility]. I called him to discuss. He is not interested at all. He did ask that I email him some info about [facility]. We will do this and he is free to call with any questions.

## 2019-11-19 ENCOUNTER — TELEPHONE (OUTPATIENT)
Dept: PAIN MEDICINE | Facility: CLINIC | Age: 59
End: 2019-11-19

## 2019-11-20 ENCOUNTER — OFFICE VISIT (OUTPATIENT)
Dept: PAIN MEDICINE | Facility: CLINIC | Age: 59
End: 2019-11-20
Attending: ANESTHESIOLOGY
Payer: COMMERCIAL

## 2019-11-20 VITALS
HEART RATE: 78 BPM | TEMPERATURE: 99 F | WEIGHT: 224 LBS | HEIGHT: 74 IN | SYSTOLIC BLOOD PRESSURE: 139 MMHG | DIASTOLIC BLOOD PRESSURE: 94 MMHG | BODY MASS INDEX: 28.75 KG/M2 | RESPIRATION RATE: 18 BRPM

## 2019-11-20 DIAGNOSIS — R53.81 PHYSICAL DECONDITIONING: ICD-10-CM

## 2019-11-20 DIAGNOSIS — M51.36 DDD (DEGENERATIVE DISC DISEASE), LUMBAR: ICD-10-CM

## 2019-11-20 DIAGNOSIS — G89.4 CHRONIC PAIN DISORDER: Primary | ICD-10-CM

## 2019-11-20 DIAGNOSIS — Z98.1 S/P LUMBAR FUSION: ICD-10-CM

## 2019-11-20 DIAGNOSIS — M79.18 MYOFASCIAL PAIN: ICD-10-CM

## 2019-11-20 PROCEDURE — 99999 PR PBB SHADOW E&M-EST. PATIENT-LVL III: ICD-10-PCS | Mod: PBBFAC,,, | Performed by: ANESTHESIOLOGY

## 2019-11-20 PROCEDURE — 99204 PR OFFICE/OUTPT VISIT, NEW, LEVL IV, 45-59 MIN: ICD-10-PCS | Mod: S$GLB,,, | Performed by: ANESTHESIOLOGY

## 2019-11-20 PROCEDURE — 99999 PR PBB SHADOW E&M-EST. PATIENT-LVL III: CPT | Mod: PBBFAC,,, | Performed by: ANESTHESIOLOGY

## 2019-11-20 PROCEDURE — 99204 OFFICE O/P NEW MOD 45 MIN: CPT | Mod: S$GLB,,, | Performed by: ANESTHESIOLOGY

## 2019-11-20 RX ORDER — METHOCARBAMOL 500 MG/1
500-1000 TABLET, FILM COATED ORAL NIGHTLY PRN
Qty: 30 TABLET | Refills: 3 | Status: SHIPPED | OUTPATIENT
Start: 2019-11-20 | End: 2019-12-18

## 2019-11-20 RX ORDER — MELOXICAM 7.5 MG/1
7.5-15 TABLET ORAL DAILY
Qty: 30 TABLET | Refills: 3 | Status: SHIPPED | OUTPATIENT
Start: 2019-11-20 | End: 2019-12-18

## 2019-11-20 NOTE — LETTER
November 27, 2019      Ezequiel Pedroza MD  [address]  [address]  [facility] [address]           [facility] [place] [address]  [address]  [facility] [address]  Phone: [phone]  Fax: [phone]          Patient: Ezekiel Noyola   MR Number: [id number]   YOB: 1960   Date of Visit: 11/20/2019       Dear Dr. Ezequiel Pedroza:    Thank you for referring Ezekiel Noyola to me for evaluation. Attached you will find relevant portions of my assessment and plan of care.    If you have questions, please do not hesitate to call me. I look forward to following Ezekiel Noyola along with you.    Sincerely,    Dionne Purvis MD    Enclosure  CC:  No Recipients    If you would like to receive this communication electronically, please contact [email or web address] or [phone] to request more information on [employer] Link access.    For providers and/or their staff who would like to refer a patient to [facility], please contact us through our one-stop-shop provider referral line, [facility], at [phone].    If you feel you have received this communication in error or would no longer like to receive these types of communications, please e-mail [email or web address]

## 2019-11-20 NOTE — PROGRESS NOTES
"Subjective:     Patient ID: Ezekiel Noyola is a 59 y.o. male.    Chief Complaint: Pain    Consulted by: Dr. Pedroza     Disclaimer: This note was generated using voice recognition software.  There may be typographical errors that were missed during proofreading.    HPI:    Ezekiel Noyola is a 59 y.o. male who presents today with chronic low back pain. This pain started in 2005 when he acutely lost feeling in his lower body.  He then had a lumbar surgery that helped with his symptoms at the time. He continued to have pain off and on, worsening in 2013 and resulting in a lumbar fusion in 2015.  Subsequently had a revision of the fusion in 12/2018.  He describes a low back pain that is sharp, burning, throbbing, tingling, shooting, constant.  This pain is bilateral and radiates into his bilateral legs along the posterior aspect to his feet.  This is associated with numbness and tingling in the same distribution.  He reports that he is largely inactive because of this pain.  He spends most of his day in bed.  This pain is described in detail below.    Of note, he reports smoking marijuana prior to coming to this office visit.  He reports he smokes marijuana most days whenever he knows he is going to leave the house    Aggravating factors: Sitting, standing, bending, morning, lifting, getting up from a seated position    Mitigating factors: marijuana, Percocet    Previously seeing: Dr. Castillo, Dr. Pedroza    Physical Therapy: No relief.  Aquatherapy    Non-pharmacologic Treatment:     · Ice/Heat: No help  · TENS: No help  · Massage: No help  · Chiropractic care: "Don't want to see any of them any more"  · Acupuncture: "I wanted to take that doctor and choke him"  · Other: Wears a back brace         Pain Medications:         · Currently taking: Percocet 10/325 mg TID PRN    · Has tried in the past:    · Opioids: Hydrocodone  · NSAIDS: Celebrex (no help), ibuprofen (messes with his stomach)  · Tylenol: No benefit  · Muscle " "relaxants: Cyclobenzaprine, methocarbamol  · TCAs: Not tried  · SNRIs: Shasta of Cymbalta  · Anticonvulsants: Gabapentin (caused rectal bleeding per his report)  · topical creams: Voltaren gel does not help (used on his knees)  · Other: Marijuana    Blood thinners: None    Interventional Therapies:   · Epidural steroid injections: "Last one was the last time I threatened a doctor"  · 3/22/2019: Right SI joint injection: No benefit, not even the day of  · SCS Trial: "The worst five days of my life"    Relevant Surgeries:   · 2005, lumbar surgery  · 2015, lumbar fusion    Affecting sleep? Yes, 4-5 hours per night.      Affecting daily activities? Yes, "I don't do much of anything." He mostly lies in the bed    Depressive symptoms? No          · SI/HI? No    Work status: He is disabled due to pain.  He worked [employer]    Prescription Monitoring Program database:  Reviewed and consistent with medication use as prescribed.    Last 3 PDI Scores 11/20/2019   Pain Disability Index (PDI) 26       GENERAL:  No weight loss, malaise or fevers.  HEENT:   No recent changes in vision or hearing  NECK:  Negative for lumps, no difficulty with swallowing.  RESPIRATORY:  Negative for cough, wheezing or shortness of breath, patient denies any recent URI.  CARDIOVASCULAR:  Negative for chest pain, leg swelling or palpitations.  GI:  Negative for abdominal discomfort, blood in stools or black stools or change in bowel habits.  MUSCULOSKELETAL:  See HPI.  SKIN:  Negative for lesions, rash, and itching.  PSYCH:  No mood disorder or recent psychosocial stressors.    HEMATOLOGY/LYMPHOLOGY:  Negative for prolonged bleeding, bruising easily or swollen nodes.    ENDO: No history of diabetes or thyroid dysfunction  NEURO:   No history of headaches, syncope, paralysis, seizures or tremors.  All other reviewed and negative other than HPI.          Past Medical History:   Diagnosis Date    Diabetes mellitus     Gout     Hepatitis C     " Hypercholesteremia     Hypertension     Positive cardiac stress test     Positive D dimer        Past Surgical History:   Procedure Laterality Date    BACK SURGERY      FRACTURE SURGERY Left     leg    FUSION OF SPINE WITH INSTRUMENTATION N/A 12/20/2018    Procedure: FUSION, SPINE, WITH INSTRUMENTATION Removal of spinal hardware, Bilateral Jamison-White Osteotomy L5-S1, L5-S1 Interbody Arthrodesis, L4-S1 Segmental Instrumentation with Posterior Lateral Arthrodesis;  Surgeon: Ezequiel Pedroza MD;  Location: [facility] OR;  Service: Neurosurgery;  Laterality: N/A;  Procedure: Removal of spinal hardware, Revision of Posterolateral fusion from L3-S1, L5-S1 In    HIP SURGERY Right     hip fusion    INJECTION OF STEROID Right 3/22/2019    Procedure: INJECTION, STEROID Right SI Joint Block and Steroid Injection;  Surgeon: Ezequiel Pedroza MD;  Location: [facility] OR;  Service: Neurosurgery;  Laterality: Right;  Procedure: Right SI Joint Block and Steroid Injection  Surgery Time: 15 Min  LOS:0  Anesthesia: General  Radiology: C-Arm  Bed: Regular Bed  Position: Prone    KNEE ARTHROSCOPY      LAMINECTOMY      Lumbar    MYELOGRAPHY N/A 11/5/2018    Procedure: Myelogram;  Surgeon: Mel Diagnostic Provider;  Location: [address];  Service: Radiology;  Laterality: N/A;    right wrist surgery      TRIAL OF SPINAL CORD NERVE STIMULATOR N/A 9/20/2019    Procedure: Trial, Neurostimulator, Spinal Cord Spinal Cord Stimulator Trial using 2 Percutaneous Least at T9-10;  Surgeon: Ezequiel Pedroza MD;  Location: [address];  Service: Neurosurgery;  Laterality: N/A;  Procedure: Spinal Cord Stimulator Trial using 2 Percutaneous Least at T9-10  Surgery Time: 1 Hr  LOS: 0  Anesthesia: General  Radiology: C-arm  Bed: Ragland 4 Poster  Position: Prone  Eq       Review of patient's allergies indicates:   Allergen Reactions    Lactose     Gabapentin Itching     C/O med causing bump like rash on lower trunk    Penicillins Rash      "Pt states a "a couple of years ago" he took penicillin and broke out in "red spots" with no itching or difficulty breathing       Current Outpatient Medications   Medication Sig Dispense Refill    aspirin (ECOTRIN) 81 MG EC tablet Take 81 mg by mouth once daily.      atorvastatin (LIPITOR) 40 MG tablet Take 40 mg by mouth once daily.      diclofenac sodium (VOLTAREN) 1 % Gel APPLY 2.5 GRAMS TO THE AFFECTED AREA 3-4 TIMES DAILY STARTING 2 WEEKS POST SURGERY.  3    lidocaine-prilocaine (EMLA) cream APPLY 1-2 GRAMS TO THE AFFECTED AREAS 3-4 TIMES DAILY STARTING 2 WEEKS POST SURGERY.  3    metFORMIN (GLUMETZA) 500 MG (MOD) 24 hr tablet Take 500 mg by mouth 2 (two) times daily.      olmesartan-hydrochlorothiazide (BENICAR HCT) 20-12.5 mg per tablet Take 1 tablet by mouth once daily.      oxyCODONE-acetaminophen (PERCOCET)  mg per tablet Take 1 tablet by mouth every 8 (eight) hours as needed for Pain. Medically necessary 90 tablet 0    SANARE ADV SCAR THERAPY BASE Gel APPLY 1/2 GRAM (1 PUMP) TO AFFECTED AREAS TWICE DAILY STARTING 4 WEEKS POST SURGERY.  3     No current facility-administered medications for this visit.        Family History   Problem Relation Age of Onset    Diabetes Sister     Hypertension Sister     Diabetes Brother     Hypertension Brother        Social History     Socioeconomic History    Marital status:      Spouse name: Not on file    Number of children: Not on file    Years of education: Not on file    Highest education level: Not on file   Occupational History    Not on file   Social Needs    Financial resource strain: Not on file    Food insecurity:     Worry: Not on file     Inability: Not on file    Transportation needs:     Medical: Not on file     Non-medical: Not on file   Tobacco Use    Smoking status: Former Smoker   Substance and Sexual Activity    Alcohol use: No    Drug use: Yes     Types: Marijuana    Sexual activity: Not on file   Lifestyle    " "Physical activity:     Days per week: Not on file     Minutes per session: Not on file    Stress: Not on file   Relationships    Social connections:     Talks on phone: Not on file     Gets together: Not on file     Attends Zoroastrian service: Not on file     Active member of club or organization: Not on file     Attends meetings of clubs or organizations: Not on file     Relationship status: Not on file   Other Topics Concern    Not on file   Social History Narrative    Not on file       Objective:     Vitals:    11/20/19 1414   BP: (!) 139/94   Pulse: 78   Resp: 18   Temp: 99.3 °F (37.4 °C)   Weight: 101.6 kg (224 lb)   Height: 6' 2" (1.88 m)   PainSc:   8       GEN:  Well developed, well nourished.  No acute distress. No pain behavior.  HEENT:  No trauma.  Mucous membranes moist.  Nares patent bilaterally.  PSYCH: Normal affect. Thought content appropriate.  CHEST:  Breathing symmetric.  No audible wheezing.  ABD: Soft, non-distended.  SKIN:  Warm, pink, dry.  No rash on exposed areas.    EXT:  No cyanosis, clubbing, or edema.  No color change or changes in nail or hair growth.  NEURO/MUSCULOSKELETAL:  Fully alert, oriented, and appropriate. Speech normal audelia. No cranial nerve deficits.   Gait:  Antalgic.  Present trendelenburg sign bilaterally.   Motor Strength: 5/5 motor strength throughout lower extremities.   Sensory:  No sensory deficit in the lower extremities.   Reflexes:  2+ and symmetric throughout.  Downgoing Babinski's bilaterally.  No clonus or spasticity.  L-Spine:  Markedly decreased ROM with pain on all planes of motion.  Positive pain with axial/facet loading bilaterally.  Negative SLR bilaterally.    SI Joint/Hip:  Positive RYLAN bilaterally.  Positive FADIR bilaterally.  Diffuse TTP over lumbar paraspinals, bilateral SI joints, piriformis muscles, and GTB.        Imaging:        The imaging studies listed below were independently reviewed by me, and I agree with the findings as documented " below.     Narrative     EXAMINATION:  CT LUMBAR SPINE WITHOUT CONTRAST    CLINICAL HISTORY:  s/p l4-s1 revision;Other intervertebral disc degeneration, lumbosacral region    TECHNIQUE:  Low dose axial images, sagittal and coronal reformations were performed though the lumbar spine.  Contrast was not administered.    COMPARISON:  CT lumbar spine without contrast 11/05/2018    FINDINGS:  Interval removal of previously applied posterior fusion hardware at the L3 through S1 level with revision.  There is now posterior fusion at the L4 through S1 levels with L5-S1 arthrodesis and L4 and L5 laminectomies.  Soft tissue density with scattered calcifications in the operative bed.  Pedicle screws are well seated with the left S1 pedicle screw extending approximately 9 mm past the cortical margin into the presacral tissues.  Bilateral lucencies in the L3 vertebral body at the location of previously placed pedicle screws.  The vertebral body heights appear well-maintained.  There is no evidence of fracture or osseous lytic or blastic process.  There is intervertebral disc space narrowing at the L4-L5 level with remainder of the disc heights well-maintained.  Focal degenerative changes are described below.    T12-L1: There is mild facet arthropathy.  There is no neural foraminal narrowing or central canal stenosis.    L1-L2: There is mild facet arthropathy. There is no neural foraminal narrowing or central canal stenosis.    L2-L3: There is mild posterior disc bulge, and facet arthropathy resulting in mild neural foraminal narrowing.  There is mild central canal stenosis.    L3-L4: Bilateral facet arthropathy with mild right and moderate left neural foraminal narrowing.  No central canal stenosis.    L4-L5: There is no posterior disc bulge.  Operative change of laminectomy with mild bilateral neural foraminal narrowing.  No central canal stenosis.    L5-S1: There is no posterior disc bulge.  Operative change of laminectomy with  bilateral neural foraminal narrowing.  No central canal stenosis.    The spinal canal otherwise appears unremarkable.  No intradural abnormalities are identified.  There is unchanged sclerotic focus in the right ilium likely a bone island, and sclerotic changes about the sacroiliac joints bilaterally.  Right psoas muscle atrophy.  Surrounding soft tissues are otherwise unremarkable.      Impression       Operative change of L3-S1 PLIF revision, now involving the L4 through S1 levels with L5-S1 arthrodesis, and L4 and L5 laminectomies.  Of note, left S1 pedicle screw extends past the cortical margin as above.    Degenerative changes without significant spinal canal stenosis.  There is neural foraminal narrowing.    Atrophic right psoas    Additional findings as detailed above.    Electronically signed by resident: Buddy Aaron MD  Date: 01/16/2019  Time: 11:00    Electronically signed by: Ezequiel Burks MD  Date: 01/16/2019  Time: 17:20            Assessment:     Encounter Diagnoses   Name Primary?    Chronic pain disorder Yes    DDD (degenerative disc disease), lumbar     S/P lumbar fusion     Myofascial pain     Physical deconditioning        Plan:     Diagnoses and all orders for this visit:    Chronic pain disorder  -     meloxicam (MOBIC) 7.5 MG tablet; Take 1-2 tablets (7.5-15 mg total) by mouth once daily.  -     methocarbamol (ROBAXIN) 500 MG Tab; Take 1-2 tablets (500-1,000 mg total) by mouth nightly as needed (muscle spasms).    DDD (degenerative disc disease), lumbar  -     meloxicam (MOBIC) 7.5 MG tablet; Take 1-2 tablets (7.5-15 mg total) by mouth once daily.  -     methocarbamol (ROBAXIN) 500 MG Tab; Take 1-2 tablets (500-1,000 mg total) by mouth nightly as needed (muscle spasms).    S/P lumbar fusion  -     meloxicam (MOBIC) 7.5 MG tablet; Take 1-2 tablets (7.5-15 mg total) by mouth once daily.  -     methocarbamol (ROBAXIN) 500 MG Tab; Take 1-2 tablets (500-1,000 mg total) by mouth nightly as  needed (muscle spasms).    Myofascial pain  -     methocarbamol (ROBAXIN) 500 MG Tab; Take 1-2 tablets (500-1,000 mg total) by mouth nightly as needed (muscle spasms).    Physical deconditioning      His pain is consistent with the above.    The overwhelming picture here is one of deconditioning.  We had a long discussion regarding chronic pain, and in particular chronic spine pain.  We discussed the recommendations, which include symptom control with injections, none narcotic medications, and lifestyle modifications to allow physical reconditioning to occur.  This is generally done through prolonged physical therapy.  Given his current symptoms, I would recommend our functional restoration program    We discussed the assessment and recommendations.  All available images were reviewed. We discussed the disease process, prognosis, treatment plan, and risks and benefits. The patient is aware of the risks and benefits of the medications being prescribed, common side effects, and proper usage. The following is the plan we agreed on:     1. Recommend the functional restoration program.  2. Trial meloxicam 7.5-15 mg daily. Stay at most comfortable dose.  Take medication with meals.  If you experience any upset stomach, nausea, or vomiting, stop taking this medication. Patient denies history of kidney problems or ulcers.  3. Trial methocarbamol 500 mg, 1-2 tablets at bedtime as needed  4. Recommend using Tylenol 1000 mg every 8 hours as needed for pain.  Do not take this with any other medications containing acetaminophen.  Do not exceed 3000 mg of acetaminophen in 24 hours.  5. I do not recommend narcotics for the ongoing treatment his chronic pain at this time. Taper schedule given.  6. If he were to continue narcotics per his primary care physician, I would recommend frequent reassessment with urine drug screens and frequent follow ups.  I do not recommend the concomitant use of narcotic medications with marijuana,  which he is actively using  7. RTC in 3-6 weeks or sooner if needed.    Thank you for allowing me to participate in the care of this patient.   Please do not hesitate to call me at [phone] with any questions or concerns.    Dionne Purvis MD  11/20/2019     The above plan and management options were discussed at length with patient. Patient is in agreement with the above and verbalized understanding. It will be communicated with the referring physician via electronic record, fax, or mail.

## 2019-12-18 ENCOUNTER — TELEPHONE (OUTPATIENT)
Dept: PAIN MEDICINE | Facility: CLINIC | Age: 59
End: 2019-12-18

## 2019-12-18 ENCOUNTER — OFFICE VISIT (OUTPATIENT)
Dept: PAIN MEDICINE | Facility: CLINIC | Age: 59
End: 2019-12-18
Payer: COMMERCIAL

## 2019-12-18 VITALS
OXYGEN SATURATION: 100 % | TEMPERATURE: 98 F | SYSTOLIC BLOOD PRESSURE: 127 MMHG | RESPIRATION RATE: 18 BRPM | BODY MASS INDEX: 28.32 KG/M2 | DIASTOLIC BLOOD PRESSURE: 81 MMHG | WEIGHT: 220.69 LBS | HEART RATE: 80 BPM | HEIGHT: 74 IN

## 2019-12-18 DIAGNOSIS — M54.16 LUMBAR RADICULOPATHY: ICD-10-CM

## 2019-12-18 DIAGNOSIS — M51.36 DDD (DEGENERATIVE DISC DISEASE), LUMBAR: ICD-10-CM

## 2019-12-18 DIAGNOSIS — Z98.1 S/P LUMBAR FUSION: ICD-10-CM

## 2019-12-18 DIAGNOSIS — M79.18 MYOFASCIAL PAIN: ICD-10-CM

## 2019-12-18 DIAGNOSIS — R53.81 PHYSICAL DECONDITIONING: ICD-10-CM

## 2019-12-18 DIAGNOSIS — G89.4 CHRONIC PAIN DISORDER: Primary | ICD-10-CM

## 2019-12-18 DIAGNOSIS — M47.816 LUMBAR SPONDYLOSIS: ICD-10-CM

## 2019-12-18 DIAGNOSIS — G89.4 CHRONIC PAIN DISORDER: ICD-10-CM

## 2019-12-18 PROCEDURE — 99999 PR PBB SHADOW E&M-EST. PATIENT-LVL III: CPT | Mod: PBBFAC,,, | Performed by: NURSE PRACTITIONER

## 2019-12-18 PROCEDURE — 3008F BODY MASS INDEX DOCD: CPT | Mod: CPTII,S$GLB,, | Performed by: NURSE PRACTITIONER

## 2019-12-18 PROCEDURE — 3008F PR BODY MASS INDEX (BMI) DOCUMENTED: ICD-10-PCS | Mod: CPTII,S$GLB,, | Performed by: NURSE PRACTITIONER

## 2019-12-18 PROCEDURE — 99213 PR OFFICE/OUTPT VISIT, EST, LEVL III, 20-29 MIN: ICD-10-PCS | Mod: S$GLB,,, | Performed by: NURSE PRACTITIONER

## 2019-12-18 PROCEDURE — 99213 OFFICE O/P EST LOW 20 MIN: CPT | Mod: S$GLB,,, | Performed by: NURSE PRACTITIONER

## 2019-12-18 PROCEDURE — 99999 PR PBB SHADOW E&M-EST. PATIENT-LVL III: ICD-10-PCS | Mod: PBBFAC,,, | Performed by: NURSE PRACTITIONER

## 2019-12-18 RX ORDER — TIZANIDINE 4 MG/1
4 TABLET ORAL 3 TIMES DAILY PRN
Qty: 270 TABLET | Refills: 0 | Status: SHIPPED | OUTPATIENT
Start: 2019-12-18 | End: 2020-01-09

## 2019-12-18 RX ORDER — ETODOLAC 200 MG/1
200 CAPSULE ORAL 2 TIMES DAILY
Qty: 60 CAPSULE | Refills: 0 | Status: SHIPPED | OUTPATIENT
Start: 2019-12-18 | End: 2019-12-18 | Stop reason: SDUPTHER

## 2019-12-18 RX ORDER — ETODOLAC 200 MG/1
200 CAPSULE ORAL 2 TIMES DAILY
Qty: 180 CAPSULE | Refills: 0 | Status: SHIPPED | OUTPATIENT
Start: 2019-12-18 | End: 2020-01-17

## 2019-12-18 RX ORDER — TIZANIDINE 4 MG/1
4 TABLET ORAL 3 TIMES DAILY PRN
Qty: 90 TABLET | Refills: 1 | Status: SHIPPED | OUTPATIENT
Start: 2019-12-18 | End: 2019-12-18 | Stop reason: SDUPTHER

## 2019-12-18 NOTE — TELEPHONE ENCOUNTER
Contacted and spoke with patient regarding wanting to have his medication refill sent to [facility].    Staff informed patient that the provider will send the medication to [facility], but she would have to wait until the order for [facility] go thru before she send the refill order to [facility].    Provider POLY Bragg states that she is waiting for the order at [facility] to clear, if she call in the refill order for [facility] it will cancel the order at [facility].    Patient said that [facility] is going to send a message to Dr. Purvis regarding his medication to make sure that Dr. Purvis answer.    Staff informed patient that POLY Bragg and Dr. Purvis will be notified.    Patient verbalized understanding.

## 2019-12-18 NOTE — PROGRESS NOTES
Subjective:     Patient ID: Ezekiel Noyola is a 59 y.o. male.    Chief Complaint: Pain    Consulted by: Dr. Pedroza     Disclaimer: This note was generated using voice recognition software.  There may be typographical errors that were missed during proofreading.    HPI:    Ezekiel Noyola is a 59 y.o. male who presents today with chronic low back pain. This pain started in 2005 when he acutely lost feeling in his lower body.  He then had a lumbar surgery that helped with his symptoms at the time. He continued to have pain off and on, worsening in 2013 and resulting in a lumbar fusion in 2015.  Subsequently had a revision of the fusion in 12/2018.  He describes a low back pain that is sharp, burning, throbbing, tingling, shooting, constant.  This pain is bilateral and radiates into his bilateral legs along the posterior aspect to his feet.  This is associated with numbness and tingling in the same distribution.  He reports that he is largely inactive because of this pain.  He spends most of his day in bed.  This pain is described in detail below.    Of note, he reports smoking marijuana prior to coming to this office visit.  He reports he smokes marijuana most days whenever he knows he is going to leave the house    Aggravating factors: Sitting, standing, bending, morning, lifting, getting up from a seated position    Mitigating factors: marijuana, Percocet    Previously seeing: Dr. Castillo, Dr. Pedroza    Interval History (12/18/2019):  The patient returns to clinic today for follow up. He reports limited relief with medications started last month. He continues to report low back pain that radiates down the back of both legs to his feet. He does report numbness to his feet. He cannot sit due to pain. His pain is better with standing and laying flat. He is frustrated with his continued pain. He reports that he doesn't understand why he cannot have Percocet. He denies any other health changes. His pain today is 9/10.    Physical  "Therapy: No relief.  Aquatherapy    Non-pharmacologic Treatment:     · Ice/Heat: No help  · TENS: No help  · Massage: No help  · Chiropractic care: "Don't want to see any of them any more"  · Acupuncture: "I wanted to take that doctor and choke him"  · Other: Wears a back brace         Pain Medications:         · Currently taking: Percocet 10/325 mg TID PRN, methocarbamol, Mobic    · Has tried in the past:    · Opioids: Hydrocodone  · NSAIDS: Celebrex (no help), ibuprofen (messes with his stomach),  · Tylenol: No benefit  · Muscle relaxants: Cyclobenzaprine, methocarbamol  · TCAs: Not tried  · SNRIs: [place] of Cymbalta  · Anticonvulsants: Gabapentin (caused rectal bleeding per his report)  · topical creams: Voltaren gel does not help (used on his knees)  · Other: Marijuana    Blood thinners: None    Interventional Therapies:   · Epidural steroid injections: "Last one was the last time I threatened a doctor"  · 3/22/2019: Right SI joint injection: No benefit, not even the day of  · SCS Trial: "The worst five days of my life"    Relevant Surgeries:   · 2005, lumbar surgery  · 2015, lumbar fusion    Affecting sleep? Yes, 4-5 hours per night.      Affecting daily activities? Yes, "I don't do much of anything." He mostly lies in the bed    Depressive symptoms? No          · SI/HI? 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    Last 3 PDI Scores 12/18/2019 11/20/2019   Pain Disability Index (PDI) 9 26       GENERAL:  No weight loss, malaise or fevers.  HEENT:   No recent changes in vision or hearing  NECK:  Negative for lumps, no difficulty with swallowing.  RESPIRATORY:  Negative for cough, wheezing or shortness of breath, patient denies any recent URI.  CARDIOVASCULAR:  Negative for chest pain, leg swelling or palpitations. HTN  GI:  Negative for abdominal discomfort, blood in stools or black " stools or change in bowel habits.  MUSCULOSKELETAL:  See HPI.  SKIN:  Negative for lesions, rash, and itching.  PSYCH:  No mood disorder or recent psychosocial stressors.    HEMATOLOGY/LYMPHOLOGY:  Negative for prolonged bleeding, bruising easily or swollen nodes.    ENDO: No history of diabetes or thyroid dysfunction  NEURO:   No history of headaches, syncope, paralysis, seizures or tremors.  ENDO: Diabetes  All other reviewed and negative other than HPI.          Past Medical History:   Diagnosis Date    Diabetes mellitus     Gout     Hepatitis C     Hypercholesteremia     Hypertension     Positive cardiac stress test     Positive D dimer        Past Surgical History:   Procedure Laterality Date    BACK SURGERY      FRACTURE SURGERY Left     leg    FUSION OF SPINE WITH INSTRUMENTATION N/A 12/20/2018    Procedure: FUSION, SPINE, WITH INSTRUMENTATION Removal of spinal hardware, Bilateral Jamison-White Osteotomy L5-S1, L5-S1 Interbody Arthrodesis, L4-S1 Segmental Instrumentation with Posterior Lateral Arthrodesis;  Surgeon: Ezequiel Pedroza MD;  Location: [facility] OR;  Service: Neurosurgery;  Laterality: N/A;  Procedure: Removal of spinal hardware, Revision of Posterolateral fusion from L3-S1, L5-S1 In    HIP SURGERY Right     hip fusion    INJECTION OF STEROID Right 3/22/2019    Procedure: INJECTION, STEROID Right SI Joint Block and Steroid Injection;  Surgeon: Ezequiel Pedroza MD;  Location: [facility] OR;  Service: Neurosurgery;  Laterality: Right;  Procedure: Right SI Joint Block and Steroid Injection  Surgery Time: 15 Min  LOS:0  Anesthesia: General  Radiology: C-Arm  Bed: Regular Bed  Position: Prone    KNEE ARTHROSCOPY      LAMINECTOMY      Lumbar    MYELOGRAPHY N/A 11/5/2018    Procedure: Myelogram;  Surgeon: Mel Diagnostic Provider;  Location: [address];  Service: Radiology;  Laterality: N/A;    right wrist surgery      TRIAL OF SPINAL CORD NERVE STIMULATOR N/A 9/20/2019    Procedure: Trial,  "Neurostimulator, Spinal Cord Spinal Cord Stimulator Trial using 2 Percutaneous Least at T9-10;  Surgeon: Ezequiel Pedroza MD;  Location: [facility] OR [address] [place] [address];  Service: Neurosurgery;  Laterality: N/A;  Procedure: Spinal Cord Stimulator Trial using 2 Percutaneous Least at T9-10  Surgery Time: 1 Hr  LOS: 0  Anesthesia: General  Radiology: C-arm  Bed: [place] [address] Poster  Position: Prone  Eq       Review of patient's allergies indicates:   Allergen Reactions    Lactose     Gabapentin Itching     C/O med causing bump like rash on lower trunk    Penicillins Rash     Pt states a "a couple of years ago" he took penicillin and broke out in "red spots" with no itching or difficulty breathing       Current Outpatient Medications   Medication Sig Dispense Refill    aspirin (ECOTRIN) 81 MG EC tablet Take 81 mg by mouth once daily.      atorvastatin (LIPITOR) 40 MG tablet Take 40 mg by mouth once daily.      meloxicam (MOBIC) 7.5 MG tablet Take 1-2 tablets (7.5-15 mg total) by mouth once daily. 30 tablet 3    metFORMIN (GLUMETZA) 500 MG (MOD) 24 hr tablet Take 500 mg by mouth 2 (two) times daily.      methocarbamol (ROBAXIN) 500 MG Tab Take 1-2 tablets (500-1,000 mg total) by mouth nightly as needed (muscle spasms). 30 tablet 3    olmesartan-hydrochlorothiazide (BENICAR HCT) 20-12.5 mg per tablet Take 1 tablet by mouth once daily.      oxyCODONE-acetaminophen (PERCOCET)  mg per tablet Take 1 tablet by mouth every 8 (eight) hours as needed for Pain. Medically necessary 90 tablet 0     No current facility-administered medications for this visit.        Family History   Problem Relation Age of Onset    Diabetes Sister     Hypertension Sister     Diabetes Brother     Hypertension Brother        Social History     Socioeconomic History    Marital status:      Spouse name: Not on file    Number of children: Not on file    Years of education: Not on file    Highest education level: Not on file   Occupational " "History    Not on file   Social Needs    Financial resource strain: Not on file    Food insecurity:     Worry: Not on file     Inability: Not on file    Transportation needs:     Medical: Not on file     Non-medical: Not on file   Tobacco Use    Smoking status: Former Smoker   Substance and Sexual Activity    Alcohol use: No    Drug use: Yes     Types: Marijuana    Sexual activity: Not on file   Lifestyle    Physical activity:     Days per week: Not on file     Minutes per session: Not on file    Stress: Not on file   Relationships    Social connections:     Talks on phone: Not on file     Gets together: Not on file     Attends Rastafari service: Not on file     Active member of club or organization: Not on file     Attends meetings of clubs or organizations: Not on file     Relationship status: Not on file   Other Topics Concern    Not on file   Social History Narrative    Not on file       Objective:     Vitals:    12/18/19 0834   BP: 127/81   Pulse: 80   Resp: 18   Temp: 97.9 °F (36.6 °C)   SpO2: 100%   Weight: 100.1 kg (220 lb 10.9 oz)   Height: 6' 2" (1.88 m)   PainSc:   9       GEN:  Well developed, well nourished.  No acute distress. No pain behavior.  HEENT:  No trauma.  Mucous membranes moist.  Nares patent bilaterally.  PSYCH: Normal affect. Thought content appropriate.  CHEST:  Breathing symmetric.  No audible wheezing.  ABD: Soft, non-distended.  SKIN:  Warm, pink, dry.  No rash on exposed areas.    EXT:  No cyanosis, clubbing, or edema.  No color change or changes in nail or hair growth.  NEURO/MUSCULOSKELETAL:  Fully alert, oriented, and appropriate. Speech normal audelia. No cranial nerve deficits.   Gait:  Antalgic- ambulates without assistance.  Present trendelenburg sign bilaterally.   Exam limited due to pain and patient cooperation.   L-Spine:  Markedly decreased ROM with pain on all planes of motion.  Positive pain with axial/facet loading bilaterally.  Negative SLR on the left, " unable to do right due to patient cooperation.  SI Joint/Hip: Unable to perform due to patient cooperation.   Diffuse TTP over lumbar paraspinals, bilateral SI joints, piriformis muscles, and GTB.        Imaging:        The imaging studies listed below were independently reviewed by me, and I agree with the findings as documented below.     Narrative     EXAMINATION:  CT LUMBAR SPINE WITHOUT CONTRAST    CLINICAL HISTORY:  s/p l4-s1 revision;Other intervertebral disc degeneration, lumbosacral region    TECHNIQUE:  Low dose axial images, sagittal and coronal reformations were performed though the lumbar spine.  Contrast was not administered.    COMPARISON:  CT lumbar spine without contrast 11/05/2018    FINDINGS:  Interval removal of previously applied posterior fusion hardware at the L3 through S1 level with revision.  There is now posterior fusion at the L4 through S1 levels with L5-S1 arthrodesis and L4 and L5 laminectomies.  Soft tissue density with scattered calcifications in the operative bed.  Pedicle screws are well seated with the left S1 pedicle screw extending approximately 9 mm past the cortical margin into the presacral tissues.  Bilateral lucencies in the L3 vertebral body at the location of previously placed pedicle screws.  The vertebral body heights appear well-maintained.  There is no evidence of fracture or osseous lytic or blastic process.  There is intervertebral disc space narrowing at the L4-L5 level with remainder of the disc heights well-maintained.  Focal degenerative changes are described below.    T12-L1: There is mild facet arthropathy.  There is no neural foraminal narrowing or central canal stenosis.    L1-L2: There is mild facet arthropathy. There is no neural foraminal narrowing or central canal stenosis.    L2-L3: There is mild posterior disc bulge, and facet arthropathy resulting in mild neural foraminal narrowing.  There is mild central canal stenosis.    L3-L4: Bilateral facet  arthropathy with mild right and moderate left neural foraminal narrowing.  No central canal stenosis.    L4-L5: There is no posterior disc bulge.  Operative change of laminectomy with mild bilateral neural foraminal narrowing.  No central canal stenosis.    L5-S1: There is no posterior disc bulge.  Operative change of laminectomy with bilateral neural foraminal narrowing.  No central canal stenosis.    The spinal canal otherwise appears unremarkable.  No intradural abnormalities are identified.  There is unchanged sclerotic focus in the right ilium likely a bone island, and sclerotic changes about the sacroiliac joints bilaterally.  Right psoas muscle atrophy.  Surrounding soft tissues are otherwise unremarkable.      Impression       Operative change of L3-S1 PLIF revision, now involving the L4 through S1 levels with L5-S1 arthrodesis, and L4 and L5 laminectomies.  Of note, left S1 pedicle screw extends past the cortical margin as above.    Degenerative changes without significant spinal canal stenosis.  There is neural foraminal narrowing.    Atrophic right psoas    Additional findings as detailed above.    Electronically signed by resident: Buddy Aaron MD  Date: 01/16/2019  Time: 11:00    Electronically signed by: Ezequiel Burks MD  Date: 01/16/2019  Time: 17:20            Assessment:     Encounter Diagnoses   Name Primary?    Chronic pain disorder Yes    S/P lumbar fusion     Lumbar radiculopathy     Lumbar spondylosis     DDD (degenerative disc disease), lumbar     Myofascial pain     Physical deconditioning        Plan:     Ezekiel was seen today for low-back pain.    Diagnoses and all orders for this visit:    Chronic pain disorder  -     etodolac (LODINE) 200 MG Cap; Take 1 capsule (200 mg total) by mouth 2 (two) times daily.    S/P lumbar fusion  -     etodolac (LODINE) 200 MG Cap; Take 1 capsule (200 mg total) by mouth 2 (two) times daily.    Lumbar radiculopathy  -     etodolac (LODINE) 200 MG  Cap; Take 1 capsule (200 mg total) by mouth 2 (two) times daily.    Lumbar spondylosis  -     etodolac (LODINE) 200 MG Cap; Take 1 capsule (200 mg total) by mouth 2 (two) times daily.    DDD (degenerative disc disease), lumbar  -     etodolac (LODINE) 200 MG Cap; Take 1 capsule (200 mg total) by mouth 2 (two) times daily.    Myofascial pain  -     tiZANidine (ZANAFLEX) 4 MG tablet; Take 1 tablet (4 mg total) by mouth 3 (three) times daily as needed.    Physical deconditioning      His pain is consistent with the above.    The overwhelming picture here is one of deconditioning.  We had a long discussion regarding chronic pain, and in particular chronic spine pain.  We discussed the recommendations, which include symptom control with injections, none narcotic medications, and lifestyle modifications to allow physical reconditioning to occur.  This is generally done through prolonged physical therapy.  Given his current symptoms, I would recommend our functional restoration program    We discussed the assessment and recommendations.  All available images were reviewed. We discussed the disease process, prognosis, treatment plan, and risks and benefits. The patient is aware of the risks and benefits of the medications being prescribed, common side effects, and proper usage. The following is the plan we agreed on:     1. Recommend the functional restoration program. He is not interested as he cannot wake up that early in the morning.   2. Discontinue Mobic. Trial Etodolac 200 mg BID PRN. I encouraged him to take with food.   3. Discontinue Methocarbamol. Trial Zanaflex 4 mg TID PRN muscle pain.   4. Recommend using Tylenol 1000 mg every 8 hours as needed for pain.  Do not take this with any other medications containing acetaminophen.  Do not exceed 3000 mg of acetaminophen in 24 hours.  5. I do not recommend narcotics for the ongoing treatment his chronic pain at this time. Taper schedule given.  6. If he were to  continue narcotics per his primary care physician, I would recommend frequent reassessment with urine drug screens and frequent follow ups.  I do not recommend the concomitant use of narcotic medications with marijuana, which he is actively using  7. RTC in 1 month.     - Dr. Purvis was consulted on the patient and agrees with this plan.    The above plan and management options were discussed at length with patient. Patient is in agreement with the above and verbalized understanding.     Deena Astorga NP  12/18/2019

## 2019-12-18 NOTE — TELEPHONE ENCOUNTER
----- Message from Flor Albrecht sent at 12/18/2019 10:20 AM CST -----  Contact: MARILYN DWYER [[id number]]  Type: Patient Call Back    Who called:MARILYN DWYER [[id number]]    What is the request in detail: Patient is requesting a call back. He states that [facility] is going to contact them regarding his new medication. He states that he would like this prescriptions to be sent the his "[facility] mail pharmacy.   Please contact to further advise.    Can the clinic reply by [facility]? No    Best call back number: [phone]    Additional Information: N/A

## 2019-12-20 ENCOUNTER — TELEPHONE (OUTPATIENT)
Dept: PAIN MEDICINE | Facility: CLINIC | Age: 59
End: 2019-12-20

## 2019-12-20 NOTE — TELEPHONE ENCOUNTER
Staff contacted [facility] and informed the pharmacy tech that the Mobic has been discontinued.

## 2019-12-20 NOTE — TELEPHONE ENCOUNTER
----- Message from Mary Ann Torrez sent at 12/20/2019  8:34 AM CST -----  Contact: [facility]      PHARMACY  REFILL  REQUEST      Please refill the medication(s) listed below. Please call the patient when the prescription(s) is ready for  at this phone number    [phone] (H)      Medication #1   meloxicam (MOBIC) 7.5 MG tablet         Preferred Pharmacy:   [facility] - [address]     [phone] (Phone)  [phone] (Fax)

## 2020-01-09 DIAGNOSIS — M79.18 MYOFASCIAL PAIN: ICD-10-CM

## 2020-01-09 RX ORDER — TIZANIDINE 4 MG/1
4 TABLET ORAL 3 TIMES DAILY PRN
Qty: 90 TABLET | Refills: 1 | Status: SHIPPED | OUTPATIENT
Start: 2020-01-09 | End: 2020-02-08

## 2020-01-20 ENCOUNTER — TELEPHONE (OUTPATIENT)
Dept: PAIN MEDICINE | Facility: CLINIC | Age: 60
End: 2020-01-20

## 2020-01-20 NOTE — TELEPHONE ENCOUNTER
Staff contacted the patient to confirm his 01/23/20 8 am 1 month follow up appointment with Dr. Purvis. Patient can contact our office at [phone] to reschedule or cancel if needed.     Patient confirmed appointment and verbalized understanding of appointment time, date and location.     Please review and advise.

## 2020-01-21 ENCOUNTER — TELEPHONE (OUTPATIENT)
Dept: PAIN MEDICINE | Facility: CLINIC | Age: 60
End: 2020-01-21

## 2020-01-21 NOTE — TELEPHONE ENCOUNTER
Staff contacted pt to r/s appt for 1/23/20 with Dr. Purvis as she is out sick,    Pt r/s for 2/19/20 @ 8:15am

## 2020-02-19 ENCOUNTER — OFFICE VISIT (OUTPATIENT)
Dept: PAIN MEDICINE | Facility: CLINIC | Age: 60
End: 2020-02-19
Attending: ANESTHESIOLOGY
Payer: COMMERCIAL

## 2020-02-19 VITALS
BODY MASS INDEX: 28.38 KG/M2 | TEMPERATURE: 98 F | HEART RATE: 88 BPM | OXYGEN SATURATION: 100 % | HEIGHT: 74 IN | WEIGHT: 221.13 LBS | DIASTOLIC BLOOD PRESSURE: 93 MMHG | RESPIRATION RATE: 18 BRPM | SYSTOLIC BLOOD PRESSURE: 138 MMHG

## 2020-02-19 DIAGNOSIS — G89.4 CHRONIC PAIN DISORDER: Primary | ICD-10-CM

## 2020-02-19 DIAGNOSIS — M47.816 LUMBAR SPONDYLOSIS: ICD-10-CM

## 2020-02-19 DIAGNOSIS — M54.16 LUMBAR RADICULOPATHY: ICD-10-CM

## 2020-02-19 DIAGNOSIS — M79.18 MYOFASCIAL PAIN: ICD-10-CM

## 2020-02-19 DIAGNOSIS — Z98.1 S/P LUMBAR FUSION: ICD-10-CM

## 2020-02-19 DIAGNOSIS — R53.81 PHYSICAL DECONDITIONING: ICD-10-CM

## 2020-02-19 DIAGNOSIS — M51.36 DDD (DEGENERATIVE DISC DISEASE), LUMBAR: ICD-10-CM

## 2020-02-19 PROCEDURE — 99214 OFFICE O/P EST MOD 30 MIN: CPT | Mod: S$GLB,,, | Performed by: ANESTHESIOLOGY

## 2020-02-19 PROCEDURE — 3008F BODY MASS INDEX DOCD: CPT | Mod: CPTII,S$GLB,, | Performed by: ANESTHESIOLOGY

## 2020-02-19 PROCEDURE — 99999 PR PBB SHADOW E&M-EST. PATIENT-LVL IV: CPT | Mod: PBBFAC,,, | Performed by: ANESTHESIOLOGY

## 2020-02-19 PROCEDURE — 99214 PR OFFICE/OUTPT VISIT, EST, LEVL IV, 30-39 MIN: ICD-10-PCS | Mod: S$GLB,,, | Performed by: ANESTHESIOLOGY

## 2020-02-19 PROCEDURE — 3008F PR BODY MASS INDEX (BMI) DOCUMENTED: ICD-10-PCS | Mod: CPTII,S$GLB,, | Performed by: ANESTHESIOLOGY

## 2020-02-19 PROCEDURE — 99999 PR PBB SHADOW E&M-EST. PATIENT-LVL IV: ICD-10-PCS | Mod: PBBFAC,,, | Performed by: ANESTHESIOLOGY

## 2020-02-19 RX ORDER — LANCETS 33 GAUGE
EACH MISCELLANEOUS
COMMUNITY
Start: 2019-12-24 | End: 2023-02-09

## 2020-02-19 RX ORDER — BACLOFEN 10 MG/1
10 TABLET ORAL 2 TIMES DAILY PRN
Qty: 60 TABLET | Refills: 2 | Status: SHIPPED | OUTPATIENT
Start: 2020-02-19 | End: 2020-04-01

## 2020-02-19 RX ORDER — ISOPROPYL ALCOHOL 0.75 G/1
SWAB TOPICAL
COMMUNITY
Start: 2019-12-24 | End: 2023-02-09

## 2020-02-19 RX ORDER — DULOXETIN HYDROCHLORIDE 30 MG/1
30 CAPSULE, DELAYED RELEASE ORAL DAILY
Qty: 30 CAPSULE | Refills: 5 | Status: SHIPPED | OUTPATIENT
Start: 2020-02-19 | End: 2020-04-01

## 2020-02-19 RX ORDER — CALCIUM CITRATE/VITAMIN D3 200MG-6.25
TABLET ORAL
COMMUNITY
Start: 2019-12-24 | End: 2023-02-09

## 2020-02-19 NOTE — PROGRESS NOTES
Subjective:     Patient ID: Ezekiel Noyola is a 59 y.o. male.    Chief Complaint: Pain    Consulted by: Dr. Pedroza     Disclaimer: This note was generated using voice recognition software.  There may be typographical errors that were missed during proofreading.    HPI:    Ezekiel Noyola is a 59 y.o. male who presents today with chronic low back pain. This pain started in 2005 when he acutely lost feeling in his lower body.  He then had a lumbar surgery that helped with his symptoms at the time. He continued to have pain off and on, worsening in 2013 and resulting in a lumbar fusion in 2015.  Subsequently had a revision of the fusion in 12/2018.  He describes a low back pain that is sharp, burning, throbbing, tingling, shooting, constant.  This pain is bilateral and radiates into his bilateral legs along the posterior aspect to his feet.  This is associated with numbness and tingling in the same distribution.  He reports that he is largely inactive because of this pain.  He spends most of his day in bed.  This pain is described in detail below.    Of note, he reports smoking marijuana prior to coming to this office visit.  He reports he smokes marijuana most days whenever he knows he is going to leave the house    Aggravating factors: Sitting, standing, bending, morning, lifting, getting up from a seated position    Mitigating factors: marijuana, Percocet    Previously seeing: Dr. Castillo, Dr. Pedroza    Interval History (12/18/2019):  The patient returns to clinic today for follow up. He reports limited relief with medications started last month. He continues to report low back pain that radiates down the back of both legs to his feet. He does report numbness to his feet. He cannot sit due to pain. His pain is better with standing and laying flat. He is frustrated with his continued pain. He reports that he doesn't understand why he cannot have Percocet. He denies any other health changes. His pain today is 9/10.    Interval  "History (2/19/2020):  He returns today for follow up.  He reports that the etodolac cause diarrhea, so stop this medication.  The tizanidine is not helping.  He reports that nothing is helping his pain.  He is not willing to entertain injections at this point.  He is willing to entertain physical therapy, but he has issues with transportation.  Because of his pain, he is unable to use public transportation or services such as ride share services    Of note, he is also reporting myofascial neck pain.  He asks about a brace for his neck and shoulders.  He is currently wearing a lumbar support orthotic      Physical Therapy: No relief.  Aquatherapy    Non-pharmacologic Treatment:     · Ice/Heat: No help  · TENS: No help  · Massage: No help  · Chiropractic care: "Don't want to see any of them any more"  · Acupuncture: "I wanted to take that doctor and choke him"  · Other: Wears a back brace         Pain Medications:         · Currently taking: Etodolac 400 mg BID PRN (caused diarrhea), Tizanidine 4 mg (no help)    · Has tried in the past:    · Opioids: Percocet 10/325 mg TID PRN, Hydrocodone  · NSAIDS: Celebrex (no help), ibuprofen (messes with his stomach), Mobic  · Tylenol: No benefit  · Muscle relaxants: Cyclobenzaprine, methocarbamol   · TCAs: Not tried  · SNRIs: McDonald of Cymbalta  · Anticonvulsants: Gabapentin (caused rectal bleeding per his report)  · topical creams: Voltaren gel does not help (used on his knees)  · Other: Marijuana    Blood thinners: None    Interventional Therapies:   · Epidural steroid injections: "Last one was the last time I threatened a doctor"  · 3/22/2019: Right SI joint injection: No benefit, not even the day of  · SCS Trial: "The worst five days of my life"    Relevant Surgeries:   · 2005, lumbar surgery  · 2015, lumbar fusion    Affecting sleep? Yes, 4-5 hours per night.      Affecting daily activities? Yes, "I don't do much of anything." He mostly lies in the bed    Depressive " symptoms? No          · SI/HI? No    Work status: He is disabled due to pain.  He worked [employer]    Prescription Monitoring Program database:  Reviewed and consistent with medication use as prescribed.    Last 3 PDI Scores 2/19/2020 12/18/2019 11/20/2019   Pain Disability Index (PDI) [phone]       GENERAL:  No weight loss, malaise or fevers.  HEENT:   No recent changes in vision or hearing  NECK:  Negative for lumps, no difficulty with swallowing.  RESPIRATORY:  Negative for cough, wheezing or shortness of breath, patient denies any recent URI.  CARDIOVASCULAR:  Negative for chest pain, leg swelling or palpitations. HTN  GI:  Negative for abdominal discomfort, blood in stools or black stools or change in bowel habits.  MUSCULOSKELETAL:  See HPI.  SKIN:  Negative for lesions, rash, and itching.  PSYCH:  No mood disorder or recent psychosocial stressors.    HEMATOLOGY/LYMPHOLOGY:  Negative for prolonged bleeding, bruising easily or swollen nodes.    ENDO: No history of diabetes or thyroid dysfunction  NEURO:   No history of headaches, syncope, paralysis, seizures or tremors.  ENDO: Diabetes  All other reviewed and negative other than HPI.          Past Medical History:   Diagnosis Date    Diabetes mellitus     Gout     Hepatitis C     Hypercholesteremia     Hypertension     Positive cardiac stress test     Positive D dimer        Past Surgical History:   Procedure Laterality Date    BACK SURGERY      FRACTURE SURGERY Left     leg    FUSION OF SPINE WITH INSTRUMENTATION N/A 12/20/2018    Procedure: FUSION, SPINE, WITH INSTRUMENTATION Removal of spinal hardware, Bilateral Jamison-White Osteotomy L5-S1, L5-S1 Interbody Arthrodesis, L4-S1 Segmental Instrumentation with Posterior Lateral Arthrodesis;  Surgeon: Ezequiel Pedroza MD;  Location: [facility] OR;  Service: Neurosurgery;  Laterality: N/A;  Procedure: Removal of spinal hardware, Revision of Posterolateral fusion from L3-S1, L5-S1 In    HIP SURGERY Right   "   hip fusion    INJECTION OF STEROID Right 3/22/2019    Procedure: INJECTION, STEROID Right SI Joint Block and Steroid Injection;  Surgeon: Ezequiel Pedroza MD;  Location: [facility];  Service: Neurosurgery;  Laterality: Right;  Procedure: Right SI Joint Block and Steroid Injection  Surgery Time: 15 Min  LOS:0  Anesthesia: General  Radiology: C-Arm  Bed: Regular Bed  Position: Prone    KNEE ARTHROSCOPY      LAMINECTOMY      Lumbar    MYELOGRAPHY N/A 11/5/2018    Procedure: Myelogram;  Surgeon: [facility];  Location: [address];  Service: Radiology;  Laterality: N/A;    right wrist surgery      TRIAL OF SPINAL CORD NERVE STIMULATOR N/A 9/20/2019    Procedure: Trial, Neurostimulator, Spinal Cord Spinal Cord Stimulator Trial using 2 Percutaneous Least at T9-10;  Surgeon: Ezequiel Pedroza MD;  Location: [address];  Service: Neurosurgery;  Laterality: N/A;  Procedure: Spinal Cord Stimulator Trial using 2 Percutaneous Least at T9-10  Surgery Time: 1 Hr  LOS: 0  Anesthesia: General  Radiology: C-arm  Bed: [place] 4 Poster  Position: Prone  Eq       Review of patient's allergies indicates:   Allergen Reactions    Lactose     Gabapentin Itching     C/O med causing bump like rash on lower trunk    Penicillins Rash     Pt states a "a couple of years ago" he took penicillin and broke out in "red spots" with no itching or difficulty breathing       Current Outpatient Medications   Medication Sig Dispense Refill    aspirin (ECOTRIN) 81 MG EC tablet Take 81 mg by mouth once daily.      atorvastatin (LIPITOR) 40 MG tablet Take 40 mg by mouth once daily.      metFORMIN (GLUMETZA) 500 MG (MOD) 24 hr tablet Take 500 mg by mouth 2 (two) times daily.      olmesartan-hydrochlorothiazide (BENICAR HCT) 20-12.5 mg per tablet Take 1 tablet by mouth once daily.      oxyCODONE-acetaminophen (PERCOCET)  mg per tablet Take 1 tablet by mouth every 8 (eight) hours as needed for Pain. Medically necessary " "90 tablet 0     No current facility-administered medications for this visit.        Family History   Problem Relation Age of Onset    Diabetes Sister     Hypertension Sister     Diabetes Brother     Hypertension Brother        Social History     Socioeconomic History    Marital status:      Spouse name: Not on file    Number of children: Not on file    Years of education: Not on file    Highest education level: Not on file   Occupational History    Not on file   Social Needs    Financial resource strain: Not on file    Food insecurity:     Worry: Not on file     Inability: Not on file    Transportation needs:     Medical: Not on file     Non-medical: Not on file   Tobacco Use    Smoking status: Former Smoker   Substance and Sexual Activity    Alcohol use: No    Drug use: Yes     Types: Marijuana    Sexual activity: Not on file   Lifestyle    Physical activity:     Days per week: Not on file     Minutes per session: Not on file    Stress: Not on file   Relationships    Social connections:     Talks on phone: Not on file     Gets together: Not on file     Attends Restorationism service: Not on file     Active member of club or organization: Not on file     Attends meetings of clubs or organizations: Not on file     Relationship status: Not on file   Other Topics Concern    Not on file   Social History Narrative    Not on file       Objective:     Vitals:    02/19/20 0821   BP: (!) 138/93   Pulse: 88   Resp: 18   Temp: 97.8 °F (36.6 °C)   SpO2: 100%   Weight: 100.3 kg (221 lb 1.9 oz)   Height: 6' 2" (1.88 m)   PainSc: 10-Worst pain ever   PainLoc: Back       GEN:  Well developed, well nourished.  No acute distress. No pain behavior.  HEENT:  No trauma.  Mucous membranes moist.  Nares patent bilaterally.  PSYCH: Normal affect. Thought content appropriate.  CHEST:  Breathing symmetric.  No audible wheezing.  ABD: Soft, non-distended.  SKIN:  Warm, pink, dry.  No rash on exposed areas.    EXT:  No " cyanosis, clubbing, or edema.  No color change or changes in nail or hair growth.  NEURO/MUSCULOSKELETAL:  Fully alert, oriented, and appropriate. Speech normal audelia. No cranial nerve deficits.   Gait:  Antalgic- ambulates without assistance.  Present trendelenburg sign bilaterally.   Exam limited due to pain and patient cooperation.   L-Spine:  Markedly decreased ROM with pain on all planes of motion.  Positive pain with axial/facet loading bilaterally.      Previous physical exam:  Negative SLR on the left, unable to do right due to patient cooperation.  SI Joint/Hip: Unable to perform due to patient cooperation.   Diffuse TTP over lumbar paraspinals, bilateral SI joints, piriformis muscles, and GTB.        Imaging:        The imaging studies listed below were independently reviewed by me, and I agree with the findings as documented below.     Narrative     EXAMINATION:  CT LUMBAR SPINE WITHOUT CONTRAST    CLINICAL HISTORY:  s/p l4-s1 revision;Other intervertebral disc degeneration, lumbosacral region    TECHNIQUE:  Low dose axial images, sagittal and coronal reformations were performed though the lumbar spine.  Contrast was not administered.    COMPARISON:  CT lumbar spine without contrast 11/05/2018    FINDINGS:  Interval removal of previously applied posterior fusion hardware at the L3 through S1 level with revision.  There is now posterior fusion at the L4 through S1 levels with L5-S1 arthrodesis and L4 and L5 laminectomies.  Soft tissue density with scattered calcifications in the operative bed.  Pedicle screws are well seated with the left S1 pedicle screw extending approximately 9 mm past the cortical margin into the presacral tissues.  Bilateral lucencies in the L3 vertebral body at the location of previously placed pedicle screws.  The vertebral body heights appear well-maintained.  There is no evidence of fracture or osseous lytic or blastic process.  There is intervertebral disc space narrowing at  the L4-L5 level with remainder of the disc heights well-maintained.  Focal degenerative changes are described below.    T12-L1: There is mild facet arthropathy.  There is no neural foraminal narrowing or central canal stenosis.    L1-L2: There is mild facet arthropathy. There is no neural foraminal narrowing or central canal stenosis.    L2-L3: There is mild posterior disc bulge, and facet arthropathy resulting in mild neural foraminal narrowing.  There is mild central canal stenosis.    L3-L4: Bilateral facet arthropathy with mild right and moderate left neural foraminal narrowing.  No central canal stenosis.    L4-L5: There is no posterior disc bulge.  Operative change of laminectomy with mild bilateral neural foraminal narrowing.  No central canal stenosis.    L5-S1: There is no posterior disc bulge.  Operative change of laminectomy with bilateral neural foraminal narrowing.  No central canal stenosis.    The spinal canal otherwise appears unremarkable.  No intradural abnormalities are identified.  There is unchanged sclerotic focus in the right ilium likely a bone island, and sclerotic changes about the sacroiliac joints bilaterally.  Right psoas muscle atrophy.  Surrounding soft tissues are otherwise unremarkable.      Impression       Operative change of L3-S1 PLIF revision, now involving the L4 through S1 levels with L5-S1 arthrodesis, and L4 and L5 laminectomies.  Of note, left S1 pedicle screw extends past the cortical margin as above.    Degenerative changes without significant spinal canal stenosis.  There is neural foraminal narrowing.    Atrophic right psoas    Additional findings as detailed above.    Electronically signed by resident: Buddy Aaron MD  Date: 01/16/2019  Time: 11:00    Electronically signed by: Ezequiel Burks MD  Date: 01/16/2019  Time: 17:20            Assessment:     Encounter Diagnoses   Name Primary?    Chronic pain disorder Yes    S/P lumbar fusion     Lumbar radiculopathy      Lumbar spondylosis     DDD (degenerative disc disease), lumbar     Myofascial pain     Physical deconditioning        Plan:     Ezekiel was seen today for neck pain, back pain, leg pain, arm pain, foot pain, toe pain and hand pain.    Diagnoses and all orders for this visit:    Chronic pain disorder  -     Ambulatory referral/consult to Home Health; Future  -     DULoxetine (CYMBALTA) 30 MG capsule; Take 1 capsule (30 mg total) by mouth once daily.    S/P lumbar fusion  -     Ambulatory referral/consult to Home Health; Future  -     DULoxetine (CYMBALTA) 30 MG capsule; Take 1 capsule (30 mg total) by mouth once daily.    Lumbar radiculopathy  -     Ambulatory referral/consult to Home Health; Future  -     DULoxetine (CYMBALTA) 30 MG capsule; Take 1 capsule (30 mg total) by mouth once daily.    Lumbar spondylosis  -     Ambulatory referral/consult to Home Health; Future  -     DULoxetine (CYMBALTA) 30 MG capsule; Take 1 capsule (30 mg total) by mouth once daily.    DDD (degenerative disc disease), lumbar  -     Ambulatory referral/consult to Home Health; Future  -     DULoxetine (CYMBALTA) 30 MG capsule; Take 1 capsule (30 mg total) by mouth once daily.    Myofascial pain  -     Ambulatory referral/consult to Home Health; Future  -     DULoxetine (CYMBALTA) 30 MG capsule; Take 1 capsule (30 mg total) by mouth once daily.  -     baclofen (LIORESAL) 10 MG tablet; Take 1 tablet (10 mg total) by mouth 2 (two) times daily as needed (pain).    Physical deconditioning  -     Ambulatory referral/consult to Home Health; Future  -     DULoxetine (CYMBALTA) 30 MG capsule; Take 1 capsule (30 mg total) by mouth once daily.      His pain is consistent with the above.    The overwhelming picture here is one of deconditioning.  We had a long discussion regarding chronic pain, and in particular chronic spine pain.  We discussed the recommendations, which include symptom control with injections, none narcotic medications, and  lifestyle modifications to allow physical reconditioning to occur.  This is generally done through prolonged physical therapy.  Given his current symptoms, I would recommend our functional restoration program    We discussed the assessment and recommendations.  All available images were reviewed. We discussed the disease process, prognosis, treatment plan, and risks and benefits. The patient is aware of the risks and benefits of the medications being prescribed, common side effects, and proper usage. The following is the plan we agreed on:     1. Recommend the functional restoration program. He is not interested as he cannot wake up that early in the morning and is now reporting transportation issues.   2. Discontinue Etodolac.  Trial Cymbalta  3. Discontinue Zanaflex.  Trial baclofen as above  4. Referral to home health physical therapy.  I feel he is an appropriate candidate for home health physical therapy given that he is house bound due to his pain condition, being unable to safely take public transportation or ride share services.  5. Recommend using Tylenol 1000 mg every 8 hours as needed for pain.  Do not take this with any other medications containing acetaminophen.  Do not exceed 3000 mg of acetaminophen in 24 hours.  6. I do not recommend narcotics for the ongoing treatment his chronic pain at this time. Taper schedule given.  7. If he were to continue narcotics per his primary care physician, I would recommend frequent reassessment with urine drug screens and frequent follow ups.  I do not recommend the concomitant use of narcotic medications with marijuana, which he is actively using as above  8. RTC in 1 month or sooner if needed.       The above plan and management options were discussed at length with patient. Patient is in agreement with the above and verbalized understanding.     Dionne Purvis MD  02/19/2020

## 2020-02-19 NOTE — PATIENT INSTRUCTIONS
Recommend starting Cymbalta 30 mg daily for your pain.  Start this medication once daily in the morning.  If it makes you sleepy, you may take this at night.  Side effects and generally mild and include upset stomach, sleepiness, lightheadedness.  Serious side effects are rare, but can include worsening mood or thoughts of harming yourself or others.  If either these occur, please discontinue this medication immediate, and let me know.    Recommend starting baclofen 5-10 mg mg at bedtime for pain as needed for muscle spasm/pain. If tolerated, you may increase this up to twice daily.  This medication may make you sleepy or cause unsteady gait (walking), so do not drive until you know how it affect you.  Some people choose to only take this at night.      Recommend using Tylenol 1000 mg every 8 hours as needed for pain.  Do not take this with any other medications containing acetaminophen.  Do not exceed 3000 mg of acetaminophen in 24 hours.

## 2020-02-20 ENCOUNTER — TELEPHONE (OUTPATIENT)
Dept: PAIN MEDICINE | Facility: CLINIC | Age: 60
End: 2020-02-20

## 2020-02-20 NOTE — TELEPHONE ENCOUNTER
"Staff contacted the patient to inform him of Dr. Purvis's recommendation which was to continue with physical therapy and move forward from there or to discuss procedure options if he is interested.     Patient states,  "The physical therapy lady said I do not need PT and she cannot do anything for me on the out patient bases. About the procedures I have tried everything even the implant that did not help. What other type of procedures."    Staff offered to make the patient an appointment.     Patient stated he has one with Dr. Purvis next month and will keep that appointment.     Staff offered sooner available but patient wanted to keep his standing appointment.    Patient expressed thanks for the call.     "

## 2020-02-20 NOTE — TELEPHONE ENCOUNTER
"Staff contacted the patient to discuss his concerns regarding his Cymbalta prescription.     Patient states," I have been taking this medication since yesterday and woke up feeling down and do no want to do anything at all. I feel like it is making me feel depressed. I am not taking that medication. Is there something else she can prescribe that would not make me feel this way?"    Staff informed the patient that his concerns will be presented to you for review. As soon as your response is received a member of staff will contact him to notify him of your recommendation.     Patient verbalized understanding and expressed thanks.       "

## 2020-02-20 NOTE — TELEPHONE ENCOUNTER
----- Message from Abi Chan sent at 2/20/2020  8:32 AM CST -----  Contact: MARILYN DWYER   Name of Who is Calling: MARILYN DWYER       What is the request in detail: Patient is requesting a call back he states he has some questions concerning the DULoxetine (CYMBALTA) 30 MG capsule medication       Can the clinic reply by [facility]: no      What Number to Call Back [facility] not [facility]: [phone]

## 2020-02-20 NOTE — TELEPHONE ENCOUNTER
Unfortunately, no, I do not have any additional medication options at this point.  I would recommend proceeding with the physical therapy and going from there.  Procedures are always an option if he is interested.

## 2020-03-19 ENCOUNTER — TELEPHONE (OUTPATIENT)
Dept: PAIN MEDICINE | Facility: CLINIC | Age: 60
End: 2020-03-19

## 2020-03-19 DIAGNOSIS — G89.4 CHRONIC PAIN DISORDER: Primary | ICD-10-CM

## 2020-03-19 RX ORDER — NABUMETONE 500 MG/1
500 TABLET, FILM COATED ORAL 2 TIMES DAILY PRN
Qty: 60 TABLET | Refills: 5 | Status: SHIPPED | OUTPATIENT
Start: 2020-03-19 | End: 2020-04-01

## 2020-03-19 NOTE — TELEPHONE ENCOUNTER
----- Message from Adriana Waite sent at 3/19/2020  9:19 AM CDT -----  Contact: MARILYN DWYER [[id number]]  Name of Who is Calling: MARILYN DWYER [[id number]]      What is the request in detail: Pt is calling to speak to staff in regards to the status of a script being changed .... Please call to further assist .       Can the clinic reply by [facility]: N       What Number to Call Back [facility] not in [facility]: [phone]

## 2020-03-19 NOTE — TELEPHONE ENCOUNTER
Staff contacted the patient to inform him of Dr. Purvis's response noted below this note..     Patient verbalized understanding and expressed thanks for the information.

## 2020-03-19 NOTE — TELEPHONE ENCOUNTER
"Staff contacted the patient to further discuss his concerns.    Patient states," The cymbalta that Dr. Purvis prescribed was making my head spine. I was calling to see if she can change it to something else and send it to [facility]. I do not have anything for pain right now."    Staff informed the patient that his concerns would be presented to you for review.     Patient verbalized understanding. Patient also requested the option to receive a telephone call for his follow up appointment next week.      "

## 2020-03-19 NOTE — TELEPHONE ENCOUNTER
Ok, stop Cymbalta. Nabumetone sent to pharmacy. Stay at most comfortable dose.  Take medication with meals.  If you experience any upset stomach, nausea, or vomiting, stop taking this medication. Patient denies history of kidney problems or ulcers.

## 2020-03-30 ENCOUNTER — TELEPHONE (OUTPATIENT)
Dept: PAIN MEDICINE | Facility: CLINIC | Age: 60
End: 2020-03-30

## 2020-03-30 NOTE — TELEPHONE ENCOUNTER
----- Message from Eli Walker sent at 3/30/2020  9:05 AM CDT -----  Contact: Pt   Name of Who is Calling: MARILYN DWYER [[id number]]    What is the request in detail: Patient is requesting to speak with Danette regards to his medication ..............Please contact to further discuss and advise      Can the clinic reply by [facility]: No     What Number to Call Back [facility] not in [facility]:  [phone] (home)

## 2020-03-30 NOTE — TELEPHONE ENCOUNTER
Spoke to patient regarding his request. He reports he picked up the prescription that was sent to him last week and its not working.     He was offered to schedule a telephone visit, next available was Thursday.     Mr. Noyola declined, he reports he need to speak to someone today, he can't keep purchasing medication that is not helping him.

## 2020-04-01 ENCOUNTER — TELEPHONE (OUTPATIENT)
Dept: PAIN MEDICINE | Facility: CLINIC | Age: 60
End: 2020-04-01

## 2020-04-01 DIAGNOSIS — Z98.1 S/P LUMBAR FUSION: ICD-10-CM

## 2020-04-01 DIAGNOSIS — M79.18 MYOFASCIAL PAIN: ICD-10-CM

## 2020-04-01 DIAGNOSIS — G89.4 CHRONIC PAIN DISORDER: Primary | ICD-10-CM

## 2020-04-01 DIAGNOSIS — M54.16 LUMBAR RADICULOPATHY: ICD-10-CM

## 2020-04-01 DIAGNOSIS — M47.816 LUMBAR SPONDYLOSIS: ICD-10-CM

## 2020-04-01 RX ORDER — NORTRIPTYLINE HYDROCHLORIDE 10 MG/1
10-20 CAPSULE ORAL NIGHTLY
Qty: 60 CAPSULE | Refills: 5 | Status: SHIPPED | OUTPATIENT
Start: 2020-04-01 | End: 2021-02-01

## 2020-04-01 RX ORDER — ORPHENADRINE CITRATE 100 MG/1
100 TABLET, EXTENDED RELEASE ORAL 2 TIMES DAILY
Qty: 30 TABLET | Refills: 3 | Status: SHIPPED | OUTPATIENT
Start: 2020-04-01 | End: 2020-05-31

## 2020-04-01 NOTE — TELEPHONE ENCOUNTER
Telephone encounter due to Covid 19 concerns    Spoke to him on the phone.  Nabumetone was not helping.  Baclofen was not either, so he stopped both of these.  Heat and rest are the only things that help.    The following up for planned right great upon:  1.  Nortriptyline 10-20 mg at night  2.  Orphenadrine 100 mg twice daily as needed  3.  Call with progress

## 2020-04-09 ENCOUNTER — TELEPHONE (OUTPATIENT)
Dept: PAIN MEDICINE | Facility: CLINIC | Age: 60
End: 2020-04-09

## 2020-04-09 NOTE — TELEPHONE ENCOUNTER
----- Message from Tavon Parks sent at 4/9/2020 10:58 AM CDT -----  Contact: pt  Name of Who is Calling: pt    What is the request in detail: is needing to discuss the Rx that was just given to him. Please contact to further discuss and advise      Can the clinic reply by [facility]: no    What Number to Call Back [facility]: [phone]

## 2020-04-09 NOTE — TELEPHONE ENCOUNTER
Spoke with patient regarding new medications Pamelor and Norflex, patient stated that both medication are making him sick to his stomach and causing headaches, requesting to new medication, message sent to Dr Purvis

## 2020-04-09 NOTE — TELEPHONE ENCOUNTER
I don't have any additional medication options at this time.  Please schedule for a visit to discuss options other than medications.    Thanks!  LW

## 2020-04-13 ENCOUNTER — TELEPHONE (OUTPATIENT)
Dept: PAIN MEDICINE | Facility: CLINIC | Age: 60
End: 2020-04-13

## 2020-04-14 ENCOUNTER — HOSPITAL ENCOUNTER (EMERGENCY)
Facility: OTHER | Age: 60
Discharge: HOME | End: 2020-04-14
Attending: EMERGENCY MEDICINE
Payer: COMMERCIAL

## 2020-04-14 VITALS
WEIGHT: 220 LBS | OXYGEN SATURATION: 100 % | DIASTOLIC BLOOD PRESSURE: 76 MMHG | HEART RATE: 90 BPM | HEIGHT: 75 IN | BODY MASS INDEX: 27.35 KG/M2 | RESPIRATION RATE: 19 BRPM | TEMPERATURE: 98 F | SYSTOLIC BLOOD PRESSURE: 126 MMHG

## 2020-04-14 DIAGNOSIS — U07.1 COVID-19 VIRUS INFECTION: Primary | ICD-10-CM

## 2020-04-14 LAB — SARS-COV-2 RDRP RESP QL NAA+PROBE: POSITIVE

## 2020-04-14 PROCEDURE — U0002 COVID-19 LAB TEST NON-CDC: HCPCS

## 2020-04-14 PROCEDURE — 99283 EMERGENCY DEPT VISIT LOW MDM: CPT | Mod: 25

## 2020-04-14 RX ORDER — BENZONATATE 100 MG/1
100 CAPSULE ORAL 3 TIMES DAILY PRN
Qty: 20 CAPSULE | Refills: 0 | Status: SHIPPED | OUTPATIENT
Start: 2020-04-14 | End: 2020-04-24

## 2020-04-14 NOTE — ED NOTES
Bed: Incoming ED Transfer 1  Expected date:   Expected time:   Means of arrival:   Comments:  59m cough

## 2020-04-14 NOTE — ED TRIAGE NOTES
Patient presents to the ER via EMS with c/o chest pain, sob, cough, body aches and chills since last night.  Patient only reports taking the medications he has prescribed for his back pain.

## 2020-04-14 NOTE — ED PROVIDER NOTES
"Encounter Date: 4/14/2020    SCRIBE #1 NOTE: IMonique, smooth scribing for, and in the presence of, Dr. Mace.       History     Chief Complaint   Patient presents with    Cough     EMS reports pt w/ cough, nasal congestion, and fever that started last night     Time seen by provider: 7:25 AM    This is a 59 y.o. male who presents with complaint of productive cough that began yesterday. Patient reports left sided shoulder pain only with coughing. He denies any falls or trauma. He reports low grade fever at 99 degrees. He denies any known positive sick contacts, but states he did go to [employer]. He denies chest pain, SOB, nausea, or vomiting.  Also complains of some diarrhea and abdominal pain is been going on for last couple of days.    The history is provided by the patient.     Review of patient's allergies indicates:   Allergen Reactions    Lactose     Gabapentin Itching     C/O med causing bump like rash on lower trunk    Penicillins Rash     Pt states a "a couple of years ago" he took penicillin and broke out in "red spots" with no itching or difficulty breathing     Past Medical History:   Diagnosis Date    Diabetes mellitus     Gout     Hepatitis C     Hypercholesteremia     Hypertension     Positive cardiac stress test     Positive D dimer      Past Surgical History:   Procedure Laterality Date    BACK SURGERY      FRACTURE SURGERY Left     leg    FUSION OF SPINE WITH INSTRUMENTATION N/A 12/20/2018    Procedure: FUSION, SPINE, WITH INSTRUMENTATION Removal of spinal hardware, Bilateral Jamison-White Osteotomy L5-S1, L5-S1 Interbody Arthrodesis, L4-S1 Segmental Instrumentation with Posterior Lateral Arthrodesis;  Surgeon: Ezequiel Pedroza MD;  Location: [facility];  Service: Neurosurgery;  Laterality: N/A;  Procedure: Removal of spinal hardware, Revision of Posterolateral fusion from L3-S1, L5-S1 In    HIP SURGERY Right     hip fusion    INJECTION OF STEROID Right 3/22/2019    Procedure: " INJECTION, STEROID Right SI Joint Block and Steroid Injection;  Surgeon: Ezequiel Pedroza MD;  Location: [facility];  Service: Neurosurgery;  Laterality: Right;  Procedure: Right SI Joint Block and Steroid Injection  Surgery Time: 15 Min  LOS:0  Anesthesia: General  Radiology: C-Arm  Bed: Regular Bed  Position: Prone    KNEE ARTHROSCOPY      LAMINECTOMY      Lumbar    MYELOGRAPHY N/A 11/5/2018    Procedure: Myelogram;  [facility];  Location: [address];  Service: Radiology;  Laterality: N/A;    right wrist surgery      TRIAL OF SPINAL CORD NERVE STIMULATOR N/A 9/20/2019    Procedure: Trial, Neurostimulator, Spinal Cord Spinal Cord Stimulator Trial using 2 Percutaneous Least at T9-10;  Surgeon: Ezequiel Pedroza MD;  Location: [facility] OR [address] [place] [address];  Service: Neurosurgery;  Laterality: N/A;  Procedure: Spinal Cord Stimulator Trial using 2 Percutaneous Least at T9-10  Surgery Time: 1 Hr  LOS: 0  Anesthesia: General  Radiology: C-arm  Bed: [place] 4 Poster  Position: Prone  Eq     Family History   Problem Relation Age of Onset    Diabetes Sister     Hypertension Sister     Diabetes Brother     Hypertension Brother      Social History     Tobacco Use    Smoking status: Former Smoker   Substance Use Topics    Alcohol use: No    Drug use: Yes     Types: Marijuana     Review of Systems   Constitutional: Positive for fever. Negative for chills.   HENT: Negative for congestion, sore throat and trouble swallowing.    Eyes: Negative for photophobia and visual disturbance.   Respiratory: Positive for cough. Negative for shortness of breath.    Cardiovascular: Negative for chest pain.   Gastrointestinal: Negative for abdominal pain, nausea and vomiting.   Genitourinary: Negative for dysuria and hematuria.   Musculoskeletal: Positive for arthralgias (left shoulder). Negative for back pain and neck pain.   Skin: Negative for rash and wound.   Neurological: Negative for weakness and headaches.    Psychiatric/Behavioral: Negative.        Physical Exam     Initial Vitals   BP Pulse Resp Temp SpO2   04/14/20 0702 04/14/20 0702 04/14/20 0724 04/14/20 0724 04/14/20 0702   126/76 86 19 97.7 °F (36.5 °C) 98 %      MAP       --                Physical Exam    Nursing note and vitals reviewed.  Constitutional: He appears well-developed and well-nourished. No distress.   HENT:   Head: Normocephalic and atraumatic.   Eyes: EOM are normal.   Neck: Normal range of motion.   Cardiovascular: Normal rate, regular rhythm and intact distal pulses.   Pulmonary/Chest: No respiratory distress.   Speaking full sentences.  No tachypnea.  No accessory muscle use.  No respiratory distress.   Abdominal: Soft.   Musculoskeletal: Normal range of motion.   Trapezius muscle spasm on left.   Neurological: He is alert and oriented to person, place, and time.   Skin: Skin is warm. No rash noted.   Psychiatric: He has a normal mood and affect. Thought content normal.         ED Course   Procedures  Labs Reviewed   SARS-COV-2 RNA AMPLIFICATION, QUAL - Abnormal; Notable for the following components:       Result Value    SARS-CoV-2 RNA, Amplification, Qual Positive (*)     All other components within normal limits    Narrative:     What symptom criteria does the patient meet?->Cough  What symptom criteria does the patient meet?->Difficulty  breathing          Imaging Results          X-Ray Chest AP Portable (Final result)  Result time 04/14/20 08:01:35    Final result by Neal Kahn MD (04/14/20 08:01:35)                 Impression:      No acute abnormality.Stable appearance.      Electronically signed by: Neal Kahn MD  Date:    04/14/2020  Time:    08:01             Narrative:    EXAMINATION:  XR CHEST AP PORTABLE    CLINICAL HISTORY:  Suspected Covid-19 Virus Infection;    TECHNIQUE:  Single frontal view of the chest was performed.    COMPARISON:  11/11/2009    FINDINGS:  The lungs are clear with normal appearance of  pulmonary vasculature. No pleural effusion. No evident pneumothorax.    The cardiac silhouette is normal in size. The hilar and mediastinal contours are unremarkable.    Bones are intact.                              X-Rays:   Independently Interpreted Readings:   Chest X-Ray: Chest x-ray independently interpreted by myself shows normal heart size, no infiltrate, no bony abnormalities.  Overall impression negative chest x-ray.     Medical Decision Making:   History:   Old Medical Records: I decided to obtain old medical records.  Initial Assessment:   59-year-old male cough, diarrhea congestion.  Started yesterday.  Symptoms suggestive of COVID-19.  Point of care of COVID-19 testing was positive.  Chest x-ray shows no infiltrate.  Vital signs otherwise normal and oxygen saturations are 99%.  In no respiratory distress.  Do not feel any but further a blood work is indicated at this time.  I discussed the need for self quarantine with the patient.  Will give him the [facility] handout regarding recommendations.  Given return precautions including shortness of breath or difficulty breathing.    This is the extent to the patients complaints today here in the emergency department.  Independently Interpreted Test(s):   I have ordered and independently interpreted X-rays - see prior notes.  Clinical Tests:   Lab Tests: Ordered and Reviewed  Radiological Study: Ordered and Reviewed            Scribe Attestation:   Scribe #1: I performed the above scribed service and the documentation accurately describes the services I performed. I attest to the accuracy of the note.    Attending Attestation:           Physician Attestation for Scribe:  Physician Attestation Statement for Scribe #1: I, Dr. Mace, reviewed documentation, as scribed by Monique Baez in my presence, and it is both accurate and complete.                 ED Course as of Apr 14 0852   Tue Apr 14, 2020   0812 Chest x-ray is normal.  COVID-19 positive.  Updated the  patient.  The patient's vital signs are otherwise stable.  Do not feel he needs to stay in the hospital.  Make him aware of the suspected time which he may have symptoms which can last up to 2-3 weeks.  Will discharge with Tessalon Perles for cough.  Also recommend Tylenol as needed.    Patient discharged home in stable condition. Diagnosis and treatment plan explained to patient and/or family member who is at bedside. I have answered all questions and the patient is satisfied with the plan of care. The patient demonstrates understanding of the care plan. This is the extent to the patients complaints today here in the emergency department.    [SM]      ED Course User Index  [SM] Heath Mace DO                Clinical Impression:     1. COVID-19 virus infection              ED Disposition Condition    Discharge Stable        ED Prescriptions     Medication Sig Dispense Start Date End Date Auth. Provider    benzonatate (TESSALON) 100 MG capsule Take 1 capsule (100 mg total) by mouth 3 (three) times daily as needed for Cough. 20 capsule 4/14/2020 4/24/2020 Heath Mace DO        Follow-up Information     Follow up With Specialties Details Why Contact Info    Dionisio Avila MD Internal Medicine Schedule an appointment as soon as possible for a visit   [id number] [facility] [address]  [facility] [address]  [phone]                                       Heath Mace DO  04/14/20 0864

## 2020-04-14 NOTE — DISCHARGE INSTRUCTIONS
"Please review these CDC informational handout entitled What do you are sick with corona virus disease 2019 (COVID19)" for details on how to properly quarantine yourself at home.       You have COVID-19.  There is no treatment for it.  Self isolate until 3 days after you no longer have any symptoms such as fever, body aches, cough, shortness of breath. Use tylenol as needed for aches/pains or fever, as needed.  "

## 2020-04-15 ENCOUNTER — PES CALL (OUTPATIENT)
Dept: ADMINISTRATIVE | Facility: CLINIC | Age: 60
End: 2020-04-15

## 2020-04-16 ENCOUNTER — OFFICE VISIT (OUTPATIENT)
Dept: PAIN MEDICINE | Facility: CLINIC | Age: 60
End: 2020-04-16
Attending: ANESTHESIOLOGY
Payer: COMMERCIAL

## 2020-04-16 DIAGNOSIS — M47.816 LUMBAR SPONDYLOSIS: ICD-10-CM

## 2020-04-16 DIAGNOSIS — M54.16 LUMBAR RADICULOPATHY: ICD-10-CM

## 2020-04-16 DIAGNOSIS — M51.36 DDD (DEGENERATIVE DISC DISEASE), LUMBAR: ICD-10-CM

## 2020-04-16 DIAGNOSIS — M79.18 MYOFASCIAL PAIN: ICD-10-CM

## 2020-04-16 DIAGNOSIS — R53.81 PHYSICAL DECONDITIONING: ICD-10-CM

## 2020-04-16 DIAGNOSIS — G89.4 CHRONIC PAIN DISORDER: Primary | ICD-10-CM

## 2020-04-16 DIAGNOSIS — Z98.1 S/P LUMBAR FUSION: ICD-10-CM

## 2020-04-16 PROCEDURE — 99442 PR PHYSICIAN TELEPHONE EVALUATION 11-20 MIN: ICD-10-PCS | Mod: 95,,, | Performed by: ANESTHESIOLOGY

## 2020-04-16 PROCEDURE — 99442 PR PHYSICIAN TELEPHONE EVALUATION 11-20 MIN: CPT | Mod: 95,,, | Performed by: ANESTHESIOLOGY

## 2020-04-16 NOTE — PROGRESS NOTES
Subjective:     Patient ID: Ezekiel Noyola is a 59 y.o. male.    Chief Complaint: Pain    Consulted by: Dr. Pedroza     Disclaimer: This note was generated using voice recognition software.  There may be typographical errors that were missed during proofreading.    The patient location is: Home   The chief complaint leading to consultation is: low back pain   Visit type: Virtual visit with audio only   Total time spent with patient: 12 min   Each patient to whom he or she provides medical services by telemedicine is: (1) informed of the relationship between the physician and patient and the respective role of any other health care provider with respect to management of the patient; and (2) notified that he or she may decline to receive medical services by telemedicine and may withdraw from such care at any time.     HPI:    Ezekiel Noyola is a 59 y.o. male who presents today with chronic low back pain. This pain started in 2005 when he acutely lost feeling in his lower body.  He then had a lumbar surgery that helped with his symptoms at the time. He continued to have pain off and on, worsening in 2013 and resulting in a lumbar fusion in 2015.  Subsequently had a revision of the fusion in 12/2018.  He describes a low back pain that is sharp, burning, throbbing, tingling, shooting, constant.  This pain is bilateral and radiates into his bilateral legs along the posterior aspect to his feet.  This is associated with numbness and tingling in the same distribution.  He reports that he is largely inactive because of this pain.  He spends most of his day in bed.  This pain is described in detail below.    Of note, he reports smoking marijuana prior to coming to this office visit.  He reports he smokes marijuana most days whenever he knows he is going to leave the house    Aggravating factors: Sitting, standing, bending, morning, lifting, getting up from a seated position    Mitigating factors: marijuana, Percocet    Previously  "seeing: Dr. Castillo, Dr. Pedroza    Interval History (12/18/2019):  The patient returns to clinic today for follow up. He reports limited relief with medications started last month. He continues to report low back pain that radiates down the back of both legs to his feet. He does report numbness to his feet. He cannot sit due to pain. His pain is better with standing and laying flat. He is frustrated with his continued pain. He reports that he doesn't understand why he cannot have Percocet. He denies any other health changes. His pain today is 9/10.    Interval History (2/19/2020):  He returns today for follow up.  He reports that the etodolac cause diarrhea, so stop this medication.  The tizanidine is not helping.  He reports that nothing is helping his pain.  He is not willing to entertain injections at this point.  He is willing to entertain physical therapy, but he has issues with transportation.  Because of his pain, he is unable to use public transportation or services such as [employer] services    Of note, he is also reporting myofascial neck pain.  He asks about a brace for his neck and shoulders.  He is currently wearing a lumbar support orthotic    Interval History (4/1/2020):  Spoke to him on the phone.  Nabumetone was not helping.  Baclofen was not either, so he stopped both of these.  Heat and rest are the only things that help.    Interval History (4/16/2020):  He returns today for follow up via audio visit.  He reports that the nortriptyline and orphenadrine caused nausea. He only wants Percocet.  He asks if I can send him to a doctor to get Percocet    Physical Therapy: No relief.  Aquatherapy    Non-pharmacologic Treatment:     · Ice/Heat: No help  · TENS: No help  · Massage: No help  · Chiropractic care: "Don't want to see any of them any more"  · Acupuncture: "I wanted to take that doctor and choke him"  · Other: Wears a back brace         Pain Medications:         · Currently taking:      · Has " "tried in the past:    · Opioids: Percocet 10/325 mg TID PRN, Hydrocodone  · NSAIDS: Etodolac 400 mg BID PRN (caused diarrhea), Celebrex (no help), ibuprofen (messes with his stomach), Mobic  · Tylenol: No benefit  · Muscle relaxants: Tizanidine 4 mg (no help), Cyclobenzaprine, methocarbamol   · TCAs: Not tried  · SNRIs: Grundy of Cymbalta  · Anticonvulsants: Gabapentin (caused rectal bleeding per his report)  · topical creams: Voltaren gel does not help (used on his knees)  · Other: Marijuana    Blood thinners: None    Interventional Therapies:   · Epidural steroid injections: "Last one was the last time I threatened a doctor"  · 3/22/2019: Right SI joint injection: No benefit, not even the day of  · SCS Trial: "The worst five days of my life"    Relevant Surgeries:   · 2005, lumbar surgery  · 2015, lumbar fusion    Affecting sleep? Yes, 4-5 hours per night.      Affecting daily activities? Yes, "I don't do much of anything." He mostly lies in the bed    Depressive symptoms? No          · SI/HI? No    Work status: He is disabled due to pain.  He worked [employer]    Prescription Monitoring Program database:  Reviewed and consistent with medication use as prescribed.    Last 3 PDI Scores 2/19/2020 12/18/2019 11/20/2019   Pain Disability Index (PDI) 66 [phone]       GENERAL:  No weight loss, malaise or fevers.  HEENT:   No recent changes in vision or hearing  NECK:  Negative for lumps, no difficulty with swallowing.  RESPIRATORY:  Negative for cough, wheezing or shortness of breath, patient denies any recent URI.  CARDIOVASCULAR:  Negative for chest pain, leg swelling or palpitations. HTN  GI:  Negative for abdominal discomfort, blood in stools or black stools or change in bowel habits.  MUSCULOSKELETAL:  See HPI.  SKIN:  Negative for lesions, rash, and itching.  PSYCH:  No mood disorder or recent psychosocial stressors.    HEMATOLOGY/LYMPHOLOGY:  Negative for prolonged bleeding, bruising easily or swollen nodes.  "   ENDO: No history of diabetes or thyroid dysfunction  NEURO:   No history of headaches, syncope, paralysis, seizures or tremors.  ENDO: Diabetes  All other reviewed and negative other than HPI.          Past Medical History:   Diagnosis Date    Diabetes mellitus     Gout     Hepatitis C     Hypercholesteremia     Hypertension     Positive cardiac stress test     Positive D dimer        Past Surgical History:   Procedure Laterality Date    BACK SURGERY      FRACTURE SURGERY Left     leg    FUSION OF SPINE WITH INSTRUMENTATION N/A 12/20/2018    Procedure: FUSION, SPINE, WITH INSTRUMENTATION Removal of spinal hardware, Bilateral Jamison-White Osteotomy L5-S1, L5-S1 Interbody Arthrodesis, L4-S1 Segmental Instrumentation with Posterior Lateral Arthrodesis;  Surgeon: Ezequiel Pedroza MD;  [facility];  Service: Neurosurgery;  Laterality: N/A;  Procedure: Removal of spinal hardware, Revision of Posterolateral fusion from L3-S1, L5-S1 In    HIP SURGERY Right     hip fusion    INJECTION OF STEROID Right 3/22/2019    Procedure: INJECTION, STEROID Right SI Joint Block and Steroid Injection;  Surgeon: Ezequiel Pedroza MD;  Location: [facility] OR;  Service: Neurosurgery;  Laterality: Right;  Procedure: Right SI Joint Block and Steroid Injection  Surgery Time: 15 Min  LOS:0  Anesthesia: General  Radiology: C-Arm  Bed: Regular Bed  Position: Prone    KNEE ARTHROSCOPY      LAMINECTOMY      Lumbar    MYELOGRAPHY N/A 11/5/2018    Procedure: Myelogram;  Surgeon: [facility] Diagnostic Provider;  Location: [facility] OR [address] [place] [address];  Service: Radiology;  Laterality: N/A;    right wrist surgery      TRIAL OF SPINAL CORD NERVE STIMULATOR N/A 9/20/2019    Procedure: Trial, Neurostimulator, Spinal Cord Spinal Cord Stimulator Trial using 2 Percutaneous Least at T9-10;  Surgeon: Ezequiel Pedroza MD;  Location: [facility] OR [address] [place] [address];  Service: Neurosurgery;  Laterality: N/A;  Procedure: Spinal Cord Stimulator Trial using 2 Percutaneous Least at  "T9-10  Surgery Time: 1 Hr  LOS: 0  Anesthesia: General  Radiology: C-arm  Bed: [place] 4 Poster  Position: Prone  Eq       Review of patient's allergies indicates:   Allergen Reactions    Lactose     Gabapentin Itching     C/O med causing bump like rash on lower trunk    Penicillins Rash     Pt states a "a couple of years ago" he took penicillin and broke out in "red spots" with no itching or difficulty breathing       Current Outpatient Medications   Medication Sig Dispense Refill    aspirin (ECOTRIN) 81 MG EC tablet Take 81 mg by mouth once daily.      atorvastatin (LIPITOR) 40 MG tablet Take 40 mg by mouth once daily.      BD ALCOHOL SWABS PadM       benzonatate (TESSALON) 100 MG capsule Take 1 capsule (100 mg total) by mouth 3 (three) times daily as needed for Cough. 20 capsule 0    metFORMIN (GLUMETZA) 500 MG (MOD) 24 hr tablet Take 500 mg by mouth 2 (two) times daily.      nortriptyline (PAMELOR) 10 MG capsule Take 1-2 capsules (10-20 mg total) by mouth every evening. 60 capsule 5    olmesartan-hydrochlorothiazide (BENICAR HCT) 20-12.5 mg per tablet Take 1 tablet by mouth once daily.      orphenadrine (NORFLEX) 100 mg tablet Take 1 tablet (100 mg total) by mouth 2 (two) times daily. 30 tablet 3    TRUE METRIX GLUCOSE TEST STRIP Strp       TRUEPLUS LANCETS 33 gauge Misc        No current facility-administered medications for this visit.        Family History   Problem Relation Age of Onset    Diabetes Sister     Hypertension Sister     Diabetes Brother     Hypertension Brother        Social History     Socioeconomic History    Marital status:      Spouse name: Not on file    Number of children: Not on file    Years of education: Not on file    Highest education level: Not on file   Occupational History    Not on file   Social Needs    Financial resource strain: Not on file    Food insecurity:     Worry: Not on file     Inability: Not on file    Transportation needs:     Medical: " Not on file     Non-medical: Not on file   Tobacco Use    Smoking status: Former Smoker   Substance and Sexual Activity    Alcohol use: No    Drug use: Yes     Types: Marijuana    Sexual activity: Not on file   Lifestyle    Physical activity:     Days per week: Not on file     Minutes per session: Not on file    Stress: Not on file   Relationships    Social connections:     Talks on phone: Not on file     Gets together: Not on file     Attends [employer] service: Not on file     Active member of club or organization: Not on file     Attends meetings of clubs or organizations: Not on file     Relationship status: Not on file   Other Topics Concern    Not on file   Social History Narrative    Not on file       Objective:     There were no vitals filed for this visit.    No physical exam performed due to audio visit only.    Previous physical exam:    GEN:  Well developed, well nourished.  No acute distress. No pain behavior.  HEENT:  No trauma.  Mucous membranes moist.  Nares patent bilaterally.  PSYCH: Normal affect. Thought content appropriate.  CHEST:  Breathing symmetric.  No audible wheezing.  ABD: Soft, non-distended.  SKIN:  Warm, pink, dry.  No rash on exposed areas.    EXT:  No cyanosis, clubbing, or edema.  No color change or changes in nail or hair growth.  NEURO/MUSCULOSKELETAL:  Fully alert, oriented, and appropriate. Speech normal audelia. No cranial nerve deficits.   Gait:  Antalgic- ambulates without assistance.  Present trendelenburg sign bilaterally.   Exam limited due to pain and patient cooperation.   L-Spine:  Markedly decreased ROM with pain on all planes of motion.  Positive pain with axial/facet loading bilaterally.      Previous physical exam:  Negative SLR on the left, unable to do right due to patient cooperation.  SI Joint/Hip: Unable to perform due to patient cooperation.   Diffuse TTP over lumbar paraspinals, bilateral SI joints, piriformis muscles, and GTB.        Imaging:         The imaging studies listed below were independently reviewed by me, and I agree with the findings as documented below.     Narrative     EXAMINATION:  CT LUMBAR SPINE WITHOUT CONTRAST    CLINICAL HISTORY:  s/p l4-s1 revision;Other intervertebral disc degeneration, lumbosacral region    TECHNIQUE:  Low dose axial images, sagittal and coronal reformations were performed though the lumbar spine.  Contrast was not administered.    COMPARISON:  CT lumbar spine without contrast 11/05/2018    FINDINGS:  Interval removal of previously applied posterior fusion hardware at the L3 through S1 level with revision.  There is now posterior fusion at the L4 through S1 levels with L5-S1 arthrodesis and L4 and L5 laminectomies.  Soft tissue density with scattered calcifications in the operative bed.  Pedicle screws are well seated with the left S1 pedicle screw extending approximately 9 mm past the cortical margin into the presacral tissues.  Bilateral lucencies in the L3 vertebral body at the location of previously placed pedicle screws.  The vertebral body heights appear well-maintained.  There is no evidence of fracture or osseous lytic or blastic process.  There is intervertebral disc space narrowing at the L4-L5 level with remainder of the disc heights well-maintained.  Focal degenerative changes are described below.    T12-L1: There is mild facet arthropathy.  There is no neural foraminal narrowing or central canal stenosis.    L1-L2: There is mild facet arthropathy. There is no neural foraminal narrowing or central canal stenosis.    L2-L3: There is mild posterior disc bulge, and facet arthropathy resulting in mild neural foraminal narrowing.  There is mild central canal stenosis.    L3-L4: Bilateral facet arthropathy with mild right and moderate left neural foraminal narrowing.  No central canal stenosis.    L4-L5: There is no posterior disc bulge.  Operative change of laminectomy with mild bilateral neural foraminal  narrowing.  No central canal stenosis.    L5-S1: There is no posterior disc bulge.  Operative change of laminectomy with bilateral neural foraminal narrowing.  No central canal stenosis.    The spinal canal otherwise appears unremarkable.  No intradural abnormalities are identified.  There is unchanged sclerotic focus in the right ilium likely a bone island, and sclerotic changes about the sacroiliac joints bilaterally.  Right psoas muscle atrophy.  Surrounding soft tissues are otherwise unremarkable.      Impression       Operative change of L3-S1 PLIF revision, now involving the L4 through S1 levels with L5-S1 arthrodesis, and L4 and L5 laminectomies.  Of note, left S1 pedicle screw extends past the cortical margin as above.    Degenerative changes without significant spinal canal stenosis.  There is neural foraminal narrowing.    Atrophic right psoas    Additional findings as detailed above.    Electronically signed by resident: Buddy Aaron MD  Date: 01/16/2019  Time: 11:00    Electronically signed by: Ezequiel Burks MD  Date: 01/16/2019  Time: 17:20            Assessment:     Encounter Diagnoses   Name Primary?    Chronic pain disorder Yes    S/P lumbar fusion     Lumbar radiculopathy     Lumbar spondylosis     Myofascial pain     DDD (degenerative disc disease), lumbar     Physical deconditioning        Plan:     Diagnoses and all orders for this visit:    Chronic pain disorder    S/P lumbar fusion    Lumbar radiculopathy    Lumbar spondylosis    Myofascial pain    DDD (degenerative disc disease), lumbar    Physical deconditioning      His pain is consistent with the above.    The overwhelming picture here is one of deconditioning.  We had a long discussion regarding chronic pain, and in particular chronic spine pain.  We discussed the recommendations, which include symptom control with injections, none narcotic medications, and lifestyle modifications to allow physical reconditioning to occur.  This  is generally done through prolonged physical therapy.  Given his current symptoms, I would recommend our functional restoration program    We discussed the assessment and recommendations.  All available images were reviewed. We discussed the disease process, prognosis, treatment plan, and risks and benefits. The patient is aware of the risks and benefits of the medications being prescribed, common side effects, and proper usage. The following is the plan we agreed on:     1. Recommend the functional restoration program. He is not interested as he cannot wake up that early in the morning and is now reporting transportation issues.   2. Given his adverse reaction to multiple medications, I do not recommend any additional medications at this time.  3. Referral to home health physical therapy.  I feel he is an appropriate candidate for home health physical therapy given that he is house bound due to his pain condition, being unable to safely take public transportation or ride share services.  4. Recommend using Tylenol 1000 mg every 8 hours as needed for pain.  Do not take this with any other medications containing acetaminophen.  Do not exceed 3000 mg of acetaminophen in 24 hours.  5. I do not recommend narcotics for the ongoing treatment his chronic pain at this time.   6. If he were to continue narcotics per his primary care physician, I would recommend frequent reassessment with urine drug screens and frequent follow ups.  I do not recommend the concomitant use of narcotic medications with marijuana, which he is actively using  7. RTC as needed.       The above plan and management options were discussed at length with patient. Patient is in agreement with the above and verbalized understanding.     Dionne Purvis MD  04/16/2020

## 2020-07-10 ENCOUNTER — OFFICE VISIT (OUTPATIENT)
Dept: DERMATOLOGY | Facility: CLINIC | Age: 60
End: 2020-07-10
Payer: MEDICAID

## 2020-07-10 ENCOUNTER — TELEPHONE (OUTPATIENT)
Dept: DERMATOLOGY | Facility: CLINIC | Age: 60
End: 2020-07-10

## 2020-07-10 DIAGNOSIS — I87.2 STASIS DERMATITIS OF BOTH LEGS: ICD-10-CM

## 2020-07-10 DIAGNOSIS — L30.4 INTERTRIGO: Primary | ICD-10-CM

## 2020-07-10 PROCEDURE — 99202 PR OFFICE/OUTPT VISIT, NEW, LEVL II, 15-29 MIN: ICD-10-PCS | Mod: 25,S$PBB,, | Performed by: DERMATOLOGY

## 2020-07-10 PROCEDURE — 99202 OFFICE O/P NEW SF 15 MIN: CPT | Mod: 25,S$PBB,, | Performed by: DERMATOLOGY

## 2020-07-10 PROCEDURE — 99213 OFFICE O/P EST LOW 20 MIN: CPT | Mod: PBBFAC | Performed by: DERMATOLOGY

## 2020-07-10 PROCEDURE — 87220 TISSUE EXAM FOR FUNGI: CPT | Mod: PBBFAC | Performed by: DERMATOLOGY

## 2020-07-10 PROCEDURE — 99999 PR PBB SHADOW E&M-EST. PATIENT-LVL III: CPT | Mod: PBBFAC,,, | Performed by: DERMATOLOGY

## 2020-07-10 PROCEDURE — 99999 PR PBB SHADOW E&M-EST. PATIENT-LVL III: ICD-10-PCS | Mod: PBBFAC,,, | Performed by: DERMATOLOGY

## 2020-07-10 RX ORDER — TRIAMCINOLONE ACETONIDE 5 MG/G
CREAM TOPICAL 2 TIMES DAILY
Qty: 30 G | Refills: 1 | Status: SHIPPED | OUTPATIENT
Start: 2020-07-10 | End: 2021-07-15 | Stop reason: CLARIF

## 2020-07-10 RX ORDER — TRIAMCINOLONE ACETONIDE 1 MG/G
CREAM TOPICAL
Qty: 1 BOTTLE | Refills: 1 | Status: SHIPPED | OUTPATIENT
Start: 2020-07-10 | End: 2020-07-10 | Stop reason: SDUPTHER

## 2020-07-10 RX ORDER — TRIAMCINOLONE ACETONIDE 1 MG/G
CREAM TOPICAL
Qty: 1 BOTTLE | Refills: 1 | Status: SHIPPED | OUTPATIENT
Start: 2020-07-10 | End: 2020-07-15

## 2020-07-10 NOTE — LETTER
July 12, 2020      Dionisio Avila MD  [id number] [address]  [facility] [address]           [facility] - Dermatology  [address]  [facility] [address]  Phone: [phone]  Fax: [phone]          Patient: Ezekiel Noyola   MR Number: [id number]   YOB: 1960   Date of Visit: 7/10/2020       Dear Dr. Dionisio Avila:    Thank you for referring Ezekiel Noyola to me for evaluation. Attached you will find relevant portions of my assessment and plan of care.    If you have questions, please do not hesitate to call me. I look forward to following Ezekiel Noyola along with you.    Sincerely,    Caitlin Denis MD    Enclosure  CC:  No Recipients    If you would like to receive this communication electronically, please contact [email or web address] or [phone] to request more information on [employer] Link access.    For providers and/or their staff who would like to refer a patient to [facility], please contact us through our one-stop-shop provider referral line, [facility], at [phone].    If you feel you have received this communication in error or would no longer like to receive these types of communications, please e-mail [email or web address]

## 2020-07-10 NOTE — PROGRESS NOTES
Subjective:       Patient ID:  Ezekiel Noyola is a 60 y.o. male who presents for   Chief Complaint   Patient presents with    Itching     HPI  Pt presents today for itching in groin, buttocks, and left leg x 1 year.   Treated itching with vaseline and otc anti itch lotions.   6-8 months after back fusion surgery in 2018, the itching started.  Denies any other concerns on skin today.       Review of Systems   Constitutional: Positive for night sweats. Negative for fever, chills, weight loss, weight gain, fatigue and malaise.   Skin: Negative for daily sunscreen use, activity-related sunscreen use and recent sunburn.   Hematologic/Lymphatic: Does not bruise/bleed easily.        Objective:    Physical Exam   Constitutional: He appears well-developed and well-nourished. No distress.   Neurological: He is alert and oriented to person, place, and time. He is not disoriented.   Psychiatric: He has a normal mood and affect.   Skin:   Areas Examined (abnormalities noted in diagram):   Head / Face Inspection Performed  Neck Inspection Performed  Genitals / Buttocks / Groin Inspection Performed  Back Inspection Performed  RLE Inspected  LLE Inspection Performed              Diagram Legend     Erythematous scaling macule/papule c/w actinic keratosis       Vascular papule c/w angioma      Pigmented verrucoid papule/plaque c/w seborrheic keratosis      Yellow umbilicated papule c/w sebaceous hyperplasia      Irregularly shaped tan macule c/w lentigo     1-2 mm smooth white papules consistent with Milia      Movable subcutaneous cyst with punctum c/w epidermal inclusion cyst      Subcutaneous movable cyst c/w pilar cyst      Firm pink to brown papule c/w dermatofibroma      Pedunculated fleshy papule(s) c/w skin tag(s)      Evenly pigmented macule c/w junctional nevus     Mildly variegated pigmented, slightly irregular-bordered macule c/w mildly atypical nevus      Flesh colored to evenly pigmented papule c/w intradermal nevus        Pink pearly papule/plaque c/w basal cell carcinoma      Erythematous hyperkeratotic cursted plaque c/w SCC      Surgical scar with no sign of skin cancer recurrence      Open and closed comedones      Inflammatory papules and pustules      Verrucoid papule consistent consistent with wart     Erythematous eczematous patches and plaques     Dystrophic onycholytic nail with subungual debris c/w onychomycosis     Umbilicated papule    Erythematous-base heme-crusted tan verrucoid plaque consistent with inflamed seborrheic keratosis     Erythematous Silvery Scaling Plaque c/w Psoriasis     See annotation      Assessment / Plan:        Intertrigo  -     triamcinolone acetonide 0.1% (KENALOG) 0.1 % SHAKE LOTION; Apply to affected areas in groin and on buttocks BID after cool blow dry  Dispense: 1 Bottle; Refill: 1    Cool blow dry after showering. Once clear, use Zeasorb AF powder for maintenance.    Stasis dermatitis of both legs  -     triamcinolone acetonide 0.5% (KENALOG) 0.5 % Crea; Apply topically 2 (two) times daily. To affected areas on legs x 1-2 wks then prn flares only  Dispense: 30 g; Refill: 1    rtc prn

## 2020-07-10 NOTE — TELEPHONE ENCOUNTER
Returned the pharmacy call. They don't have Loprox cream in stock. The pt was only able to receive one of the medications.       TB       ----- Message from Efrain Good sent at 7/10/2020  3:56 PM CDT -----  Contact: pt @ [phone]  Pt states pharmacy cannot mix triamcinolone acetonide bc they do not have both medications to mix together, pt asking if there's something else he can use. Pt asking for call back before the doctor sends to pharmacy.    [id number] - [place], [place] - [address]  [address] [facility] [address]  Phone: [phone] Fax: [phone]

## 2020-07-14 ENCOUNTER — TELEPHONE (OUTPATIENT)
Dept: DERMATOLOGY | Facility: CLINIC | Age: 60
End: 2020-07-14

## 2020-07-14 DIAGNOSIS — L30.4 INTERTRIGO: ICD-10-CM

## 2020-07-14 NOTE — TELEPHONE ENCOUNTER
----- Message from Kimberly Ballard sent at 7/14/2020 10:15 AM CDT -----  Regarding: Pt  Reason: Pt stated his insurance doesn't cover medication for his compound cream at [facility]. Pt stated it needs to be sent to [facility]     [facility]   Communication:[phone]  Fax [phone]

## 2020-07-15 ENCOUNTER — TELEPHONE (OUTPATIENT)
Dept: DERMATOLOGY | Facility: CLINIC | Age: 60
End: 2020-07-15

## 2020-07-15 DIAGNOSIS — L30.4 INTERTRIGO: ICD-10-CM

## 2020-07-15 RX ORDER — TRIAMCINOLONE ACETONIDE 1 MG/G
CREAM TOPICAL
Qty: 30 G | Refills: 0 | Status: SHIPPED | OUTPATIENT
Start: 2020-07-15 | End: 2021-07-15 | Stop reason: CLARIF

## 2020-07-15 RX ORDER — CICLOPIROX OLAMINE 7.7 MG/G
CREAM TOPICAL 2 TIMES DAILY
Qty: 30 G | Refills: 0 | Status: SHIPPED | OUTPATIENT
Start: 2020-07-15 | End: 2021-07-15 | Stop reason: CLARIF

## 2020-07-15 RX ORDER — TRIAMCINOLONE ACETONIDE 1 MG/G
CREAM TOPICAL
Qty: 1 BOTTLE | Refills: 1 | Status: SHIPPED | OUTPATIENT
Start: 2020-07-15 | End: 2020-07-15

## 2020-07-15 NOTE — TELEPHONE ENCOUNTER
----- Message from Yfn Min MA sent at 7/15/2020  2:41 PM CDT -----  Regarding: new rx  Heshonda Sharma pt would like meds sent to [facility] on [address]. Pt says he called [facility] and that told him they could not fill the rx an that they cancelled it .

## 2020-07-16 NOTE — TELEPHONE ENCOUNTER
I sent them today to [facility]. He will have to mix it himself, and the instructions should be on the box label.

## 2020-09-28 ENCOUNTER — HOSPITAL ENCOUNTER (EMERGENCY)
Facility: OTHER | Age: 60
Discharge: HOME | End: 2020-09-28
Attending: EMERGENCY MEDICINE
Payer: COMMERCIAL

## 2020-09-28 VITALS
TEMPERATURE: 98 F | HEART RATE: 61 BPM | RESPIRATION RATE: 18 BRPM | SYSTOLIC BLOOD PRESSURE: 145 MMHG | BODY MASS INDEX: 28.49 KG/M2 | HEIGHT: 74 IN | DIASTOLIC BLOOD PRESSURE: 91 MMHG | OXYGEN SATURATION: 96 % | WEIGHT: 222 LBS

## 2020-09-28 DIAGNOSIS — R10.9 FLANK PAIN: ICD-10-CM

## 2020-09-28 DIAGNOSIS — L02.219 SUPRAPUBIC ABSCESS: Primary | ICD-10-CM

## 2020-09-28 LAB
ALBUMIN SERPL BCP-MCNC: 4.2 G/DL (ref 3.5–5.2)
ALP SERPL-CCNC: 79 U/L (ref 55–135)
ALT SERPL W/O P-5'-P-CCNC: 18 U/L (ref 10–44)
ANION GAP SERPL CALC-SCNC: 12 MMOL/L (ref 8–16)
AST SERPL-CCNC: 17 U/L (ref 10–40)
BASOPHILS # BLD AUTO: 0.07 K/UL (ref 0–0.2)
BASOPHILS NFR BLD: 1 % (ref 0–1.9)
BILIRUB SERPL-MCNC: 0.7 MG/DL (ref 0.1–1)
BILIRUB UR QL STRIP: NEGATIVE
BUN SERPL-MCNC: 13 MG/DL (ref 6–20)
CALCIUM SERPL-MCNC: 9.2 MG/DL (ref 8.7–10.5)
CHLORIDE SERPL-SCNC: 106 MMOL/L (ref 95–110)
CLARITY UR: CLEAR
CO2 SERPL-SCNC: 20 MMOL/L (ref 23–29)
COLOR UR: YELLOW
CREAT SERPL-MCNC: 1.1 MG/DL (ref 0.5–1.4)
DIFFERENTIAL METHOD: ABNORMAL
EOSINOPHIL # BLD AUTO: 0.4 K/UL (ref 0–0.5)
EOSINOPHIL NFR BLD: 5 % (ref 0–8)
ERYTHROCYTE [DISTWIDTH] IN BLOOD BY AUTOMATED COUNT: 14.5 % (ref 11.5–14.5)
EST. GFR  (AFRICAN AMERICAN): >60 ML/MIN/1.73 M^2
EST. GFR  (NON AFRICAN AMERICAN): >60 ML/MIN/1.73 M^2
GLUCOSE SERPL-MCNC: 163 MG/DL (ref 70–110)
GLUCOSE UR QL STRIP: NEGATIVE
HCT VFR BLD AUTO: 49.1 % (ref 40–54)
HGB BLD-MCNC: 15.7 G/DL (ref 14–18)
HGB UR QL STRIP: NEGATIVE
HIV 1+2 AB+HIV1 P24 AG SERPL QL IA: NEGATIVE
IMM GRANULOCYTES # BLD AUTO: 0.03 K/UL (ref 0–0.04)
IMM GRANULOCYTES NFR BLD AUTO: 0.4 % (ref 0–0.5)
KETONES UR QL STRIP: NEGATIVE
LEUKOCYTE ESTERASE UR QL STRIP: NEGATIVE
LIPASE SERPL-CCNC: 24 U/L (ref 4–60)
LYMPHOCYTES # BLD AUTO: 1.8 K/UL (ref 1–4.8)
LYMPHOCYTES NFR BLD: 25.5 % (ref 18–48)
MCH RBC QN AUTO: 26.7 PG (ref 27–31)
MCHC RBC AUTO-ENTMCNC: 32 G/DL (ref 32–36)
MCV RBC AUTO: 83 FL (ref 82–98)
MONOCYTES # BLD AUTO: 0.8 K/UL (ref 0.3–1)
MONOCYTES NFR BLD: 10.7 % (ref 4–15)
NEUTROPHILS # BLD AUTO: 4.1 K/UL (ref 1.8–7.7)
NEUTROPHILS NFR BLD: 57.4 % (ref 38–73)
NITRITE UR QL STRIP: NEGATIVE
NRBC BLD-RTO: 0 /100 WBC
PH UR STRIP: 6 [PH] (ref 5–8)
PLATELET # BLD AUTO: 163 K/UL (ref 150–350)
PMV BLD AUTO: 10.6 FL (ref 9.2–12.9)
POTASSIUM SERPL-SCNC: 4.2 MMOL/L (ref 3.5–5.1)
PROT SERPL-MCNC: 7.4 G/DL (ref 6–8.4)
PROT UR QL STRIP: NEGATIVE
RBC # BLD AUTO: 5.89 M/UL (ref 4.6–6.2)
SODIUM SERPL-SCNC: 138 MMOL/L (ref 136–145)
SP GR UR STRIP: 1.01 (ref 1–1.03)
URN SPEC COLLECT METH UR: NORMAL
UROBILINOGEN UR STRIP-ACNC: NEGATIVE EU/DL
WBC # BLD AUTO: 7.21 K/UL (ref 3.9–12.7)

## 2020-09-28 PROCEDURE — 99284 EMERGENCY DEPT VISIT MOD MDM: CPT | Mod: 25

## 2020-09-28 PROCEDURE — 63600175 PHARM REV CODE 636 W HCPCS: Performed by: EMERGENCY MEDICINE

## 2020-09-28 PROCEDURE — 86703 HIV-1/HIV-2 1 RESULT ANTBDY: CPT

## 2020-09-28 PROCEDURE — 80053 COMPREHEN METABOLIC PANEL: CPT

## 2020-09-28 PROCEDURE — 96361 HYDRATE IV INFUSION ADD-ON: CPT | Mod: 59

## 2020-09-28 PROCEDURE — 25000003 PHARM REV CODE 250: Performed by: EMERGENCY MEDICINE

## 2020-09-28 PROCEDURE — 10060 I&D ABSCESS SIMPLE/SINGLE: CPT

## 2020-09-28 PROCEDURE — 83690 ASSAY OF LIPASE: CPT

## 2020-09-28 PROCEDURE — 96374 THER/PROPH/DIAG INJ IV PUSH: CPT

## 2020-09-28 PROCEDURE — 85025 COMPLETE CBC W/AUTO DIFF WBC: CPT

## 2020-09-28 PROCEDURE — 81003 URINALYSIS AUTO W/O SCOPE: CPT

## 2020-09-28 RX ORDER — KETOROLAC TROMETHAMINE 30 MG/ML
30 INJECTION, SOLUTION INTRAMUSCULAR; INTRAVENOUS
Status: COMPLETED | OUTPATIENT
Start: 2020-09-28 | End: 2020-09-28

## 2020-09-28 RX ORDER — LIDOCAINE HYDROCHLORIDE 10 MG/ML
5 INJECTION INFILTRATION; PERINEURAL
Status: COMPLETED | OUTPATIENT
Start: 2020-09-28 | End: 2020-09-28

## 2020-09-28 RX ADMIN — KETOROLAC TROMETHAMINE 30 MG: 30 INJECTION, SOLUTION INTRAMUSCULAR at 06:09

## 2020-09-28 RX ADMIN — SODIUM CHLORIDE 1000 ML: 0.9 INJECTION, SOLUTION INTRAVENOUS at 06:09

## 2020-09-28 RX ADMIN — LIDOCAINE HYDROCHLORIDE 5 ML: 10 INJECTION, SOLUTION INFILTRATION; PERINEURAL at 06:09

## 2020-09-28 NOTE — ED TRIAGE NOTES
Pt presents to ED c/o stabbing pain in groin area. Pt reports knot forming and getting larger. Pt reports abd pain. Pt reports n/v/d last night. Pt denies any other symptoms.

## 2020-09-28 NOTE — ED NOTES
Pt AAOx4, awake, calm and cooperative in bed. Pt has even/nonlabored breathing. Dr. Reagan at bedside performing I&D to lower abdomen. Pt denies any needs at this time. Will continue to monitor.

## 2020-09-28 NOTE — ED PROVIDER NOTES
"Encounter Date: 9/28/2020    SCRIBE #1 NOTE: I, Dior Olivier, am scribing for, and in the presence of, Dr. Reagan.       History     Chief Complaint   Patient presents with    Male  Problem     pt has a knot in his groin that has gotten bigger overnight and hurt when he rolled over on it this morning     Time seen by provider: 6:07 AM    This is a 60 y.o. male with a history of HTN, DM, and HLD who presents with complaint of intermittent "stabbing" pain to the right flank for the past month. He notes exacerbation of pain with eating. He reports one episode of vomiting yesterday and some diarrhea this morning. He also reports "knot" to the right groin for the past 2 days with associated pain beginning yesterday evening. He feels that it grew in size overnight. He tried heat which worsened pain and ice without significant relief. He denies any recent fever, dysuria, urinary frequency, testicular pain, or testicular swelling. He denies additional complaints at this time.    The history is provided by the patient.     Review of patient's allergies indicates:   Allergen Reactions    Lactose     Gabapentin Itching     C/O med causing bump like rash on lower trunk    Penicillins Rash     Pt states a "a couple of years ago" he took penicillin and broke out in "red spots" with no itching or difficulty breathing     Past Medical History:   Diagnosis Date    Diabetes mellitus     Gout     Hepatitis C     Hypercholesteremia     Hypertension     Positive cardiac stress test     Positive D dimer      Past Surgical History:   Procedure Laterality Date    BACK SURGERY      FRACTURE SURGERY Left     leg    FUSION OF SPINE WITH INSTRUMENTATION N/A 12/20/2018    Procedure: FUSION, SPINE, WITH INSTRUMENTATION Removal of spinal hardware, Bilateral Jamison-White Osteotomy L5-S1, L5-S1 Interbody Arthrodesis, L4-S1 Segmental Instrumentation with Posterior Lateral Arthrodesis;  Surgeon: Ezequiel Pedroza MD;  Location: [facility] OR; "  Service: Neurosurgery;  Laterality: N/A;  Procedure: Removal of spinal hardware, Revision of Posterolateral fusion from L3-S1, L5-S1 In    HIP SURGERY Right     hip fusion    INJECTION OF STEROID Right 3/22/2019    Procedure: INJECTION, STEROID Right SI Joint Block and Steroid Injection;  Surgeon: Ezequiel Pedroza MD;  Location: [facility];  Service: Neurosurgery;  Laterality: Right;  Procedure: Right SI Joint Block and Steroid Injection  Surgery Time: 15 Min  LOS:0  Anesthesia: General  Radiology: C-Arm  Bed: Regular Bed  Position: Prone    KNEE ARTHROSCOPY      LAMINECTOMY      Lumbar    MYELOGRAPHY N/A 11/5/2018    Procedure: Myelogram;  Surgeon: [facility] Diagnostic Provider;  Location: [facility] OR [address] [place] [address];  Service: Radiology;  Laterality: N/A;    right wrist surgery      TRIAL OF SPINAL CORD NERVE STIMULATOR N/A 9/20/2019    Procedure: Trial, Neurostimulator, Spinal Cord Spinal Cord Stimulator Trial using 2 Percutaneous Least at T9-10;  Surgeon: Ezequiel Pedroza MD;  Location: [facility] OR [address] [place] [address];  Service: Neurosurgery;  Laterality: N/A;  Procedure: Spinal Cord Stimulator Trial using 2 Percutaneous Least at T9-10  Surgery Time: 1 Hr  LOS: 0  Anesthesia: General  Radiology: C-arm  Bed: [place] [id number] Poster  Position: Prone  Eq     Family History   Problem Relation Age of Onset    Diabetes Sister     Hypertension Sister     Diabetes Brother     Hypertension Brother     Melanoma Neg Hx     Psoriasis Neg Hx     Lupus Neg Hx      Social History     Tobacco Use    Smoking status: Former Smoker   Substance Use Topics    Alcohol use: No    Drug use: Yes     Types: Marijuana     Review of Systems   Constitutional: Negative for chills and fever.   HENT: Negative for congestion and sore throat.    Respiratory: Negative for chest tightness and shortness of breath.    Cardiovascular: Negative for chest pain.   Gastrointestinal: Positive for diarrhea and vomiting.   Genitourinary: Positive for flank pain (right). Negative  "for dysuria, frequency, scrotal swelling and testicular pain.        Positive for "knot" to right groin.   Musculoskeletal: Negative for myalgias.   Skin: Negative for rash.   Neurological: Negative for dizziness, weakness and headaches.   Psychiatric/Behavioral: Negative for confusion.       Physical Exam     Initial Vitals [09/28/20 0550]   BP Pulse Resp Temp SpO2   (!) 171/99 90 18 98.2 °F (36.8 °C) 96 %      MAP       --         Physical Exam    Nursing note and vitals reviewed.  Constitutional: He appears well-developed and well-nourished.   HENT:   Head: Normocephalic and atraumatic.   Eyes: Conjunctivae and EOM are normal.   Neck: Normal range of motion.   Cardiovascular: Normal rate, regular rhythm and normal heart sounds.   Pulmonary/Chest: Breath sounds normal. No respiratory distress. He has no wheezes. He has no rales.   Abdominal: Soft. Bowel sounds are normal. He exhibits no distension. There is abdominal tenderness (RUQ). There is no CVA tenderness.   + Vazquez's sign   Musculoskeletal: Normal range of motion.   Neurological: He is alert and oriented to person, place, and time.   Ambulatory with steady gait.     Skin: Skin is warm and dry. Capillary refill takes less than 2 seconds. No rash noted.   2x2cm tender area of mild erythema and induration to lower suprapubic region. No obvious fluctuance or drainage.   Psychiatric: Judgment and thought content normal.         ED Course   I & D - Incision and Drainage    Date/Time: 9/28/2020 6:50 AM  Location procedure was performed: [facility] EMERGENCY DEPARTMENT  Performed by: Peggy Reagan MD  Authorized by: Peggy Reagan MD   Consent Done: Not Needed  Type: abscess  Body area: trunk  Location details: abdomen  Anesthesia: local infiltration    Anesthesia:  Local Anesthetic: lidocaine 1% without epinephrine  Anesthetic total: 3 mL  Patient sedated: no  Scalpel size: 11  Incision type: single straight  Complexity: simple  Drainage: pus  Drainage amount: " "small.  Wound treatment: incision,  wound left open,  drainage and  expression of material  Packing material: none  Complications: No  Specimens: No  Patient tolerance: Patient tolerated the procedure well with no immediate complications        Labs Reviewed   COMPREHENSIVE METABOLIC PANEL - Abnormal; Notable for the following components:       Result Value    CO2 20 (*)     Glucose 163 (*)     All other components within normal limits   CBC W/ AUTO DIFFERENTIAL - Abnormal; Notable for the following components:    Mean Corpuscular Hemoglobin 26.7 (*)     All other components within normal limits   HIV 1 / 2 ANTIBODY   LIPASE   URINALYSIS          Imaging Results          US Abdomen Limited (Final result)  Result time 09/28/20 07:49:34    Final result by Tien Denise III, MD (09/28/20 07:49:34)                 Impression:      Normal appearance of the gallbladder and biliary tree.      Electronically signed by: Tien Denise MD  Date:    09/28/2020  Time:    07:49             Narrative:    EXAMINATION:  US ABDOMEN LIMITED    CLINICAL HISTORY:  RUQ pain;    FINDINGS:  Pancreas demonstrates no abnormality.  Gallbladder demonstrates no stone wall thickening or Vazquez sign and the bile duct measures 2.9 mm.  No gallstones are seen.                                 Medical Decision Making:   History:   Old Medical Records: I decided to obtain old medical records.  Old Records Summarized: other records.  Initial Assessment:   6:07AM:  Patient is a 60-year-old male who presents to the emergency department with a "knot" in his groin for the past 2 days, along with intermittent right sided flank pain.  Patient appears well, nontoxic.  On exam he does have an area of induration, swelling and tenderness to the lower suprapubic region.  He does admit to shaving that area and initially may have started as a folliculitis.  I do not appreciate any drainage or fluctuance, though I suspect he has an abscess.  Will plan for a " bedside ultrasound.  Patient also has right-sided flank pain, intermittently over the past month, worsening after meals.  He does have right upper quadrant tenderness on exam.  Will plan for labs, right upper quadrant ultrasound, will continue to follow and reassess.  Clinical Tests:   Lab Tests: Ordered and Reviewed  Radiological Study: Ordered and Reviewed    6:22AM:  On bedside ultrasound, I do appreciate a fluid collection.  Will plan for an I and D at the bedside.  Will continue to follow.    7:11 AM:  Patient tolerated incision and drainage with no issues.  Purulent drainage was obtained.  Awaiting ultrasound and labs at this time.  Will continue to follow.    8:28 AM:  Pt doing well, remains stable.  His labs are negative for any acute findings.  His ultrasound is negative for any acute findings, including gallstones.  He is comfortable at this time and denies any abdominal or flank pain at this time.  He does have some back pain which is chronic in nature.  He has a follow-up appointment with his primary care physician next week.  Due to his reassuring workup, I do not feel a further workup any meds from is indicated at this time.  I counseled the patient regarding wound care management in regards to his abscess along with supportive care measures.  I have discussed with the pt ED return warnings and need for close PCP f/u.  Pt agreeable to plan and all questions answered.  I feel that pt is stable for discharge and management as an outpatient and no further intervention is needed at this time.  Pt is comfortable returning to the ED if needed.  Will DC home in stable condition.              Scribe Attestation:   Scribe #1: I performed the above scribed service and the documentation accurately describes the services I performed. I attest to the accuracy of the note.    Attending Attestation:           Physician Attestation for Scribe:  Physician Attestation Statement for Scribe #1: I, Dr. Reagan, reviewed  documentation, as scribed by Dior Olivier in my presence, and it is both accurate and complete.                           Clinical Impression:     1. Suprapubic abscess    2. Flank pain            ED Disposition Condition    Discharge Stable        ED Prescriptions     None        Follow-up Information     Follow up With Specialties Details Why Contact Info    Dionisio Avila MD Internal Medicine   [address] [facility] [place] [facility] [address]  [phone]                                         Peggy Reagan MD  09/28/20 2351

## 2020-09-28 NOTE — DISCHARGE INSTRUCTIONS
Use warm compresses to the area 3-4x/day.    Please return to the ER if you have chest pain, difficulty breathing, fevers, altered mental status, dizziness, weakness, or any other concerns.      Follow up with your primary care physician.

## 2021-02-01 ENCOUNTER — OFFICE VISIT (OUTPATIENT)
Dept: CARDIOLOGY | Facility: CLINIC | Age: 61
End: 2021-02-01
Payer: COMMERCIAL

## 2021-02-01 ENCOUNTER — TELEPHONE (OUTPATIENT)
Dept: DERMATOLOGY | Facility: CLINIC | Age: 61
End: 2021-02-01

## 2021-02-01 VITALS
SYSTOLIC BLOOD PRESSURE: 130 MMHG | HEART RATE: 80 BPM | WEIGHT: 235.88 LBS | DIASTOLIC BLOOD PRESSURE: 86 MMHG | HEIGHT: 74 IN | BODY MASS INDEX: 30.27 KG/M2 | OXYGEN SATURATION: 97 %

## 2021-02-01 DIAGNOSIS — E11.40 TYPE 2 DIABETES MELLITUS WITH DIABETIC NEUROPATHY, WITHOUT LONG-TERM CURRENT USE OF INSULIN: ICD-10-CM

## 2021-02-01 DIAGNOSIS — R07.2 PRECORDIAL PAIN: Primary | ICD-10-CM

## 2021-02-01 DIAGNOSIS — I10 ESSENTIAL HYPERTENSION: ICD-10-CM

## 2021-02-01 PROBLEM — E11.9 DIABETES MELLITUS, TYPE 2: Status: ACTIVE | Noted: 2021-02-01

## 2021-02-01 PROCEDURE — 93010 ELECTROCARDIOGRAM REPORT: CPT | Mod: S$GLB,,, | Performed by: INTERNAL MEDICINE

## 2021-02-01 PROCEDURE — 3008F BODY MASS INDEX DOCD: CPT | Mod: CPTII,S$GLB,, | Performed by: INTERNAL MEDICINE

## 2021-02-01 PROCEDURE — 3008F PR BODY MASS INDEX (BMI) DOCUMENTED: ICD-10-PCS | Mod: CPTII,S$GLB,, | Performed by: INTERNAL MEDICINE

## 2021-02-01 PROCEDURE — 3075F SYST BP GE 130 - 139MM HG: CPT | Mod: CPTII,S$GLB,, | Performed by: INTERNAL MEDICINE

## 2021-02-01 PROCEDURE — 3079F DIAST BP 80-89 MM HG: CPT | Mod: CPTII,S$GLB,, | Performed by: INTERNAL MEDICINE

## 2021-02-01 PROCEDURE — 3079F PR MOST RECENT DIASTOLIC BLOOD PRESSURE 80-89 MM HG: ICD-10-PCS | Mod: CPTII,S$GLB,, | Performed by: INTERNAL MEDICINE

## 2021-02-01 PROCEDURE — 93005 ELECTROCARDIOGRAM TRACING: CPT

## 2021-02-01 PROCEDURE — 3075F PR MOST RECENT SYSTOLIC BLOOD PRESS GE 130-139MM HG: ICD-10-PCS | Mod: CPTII,S$GLB,, | Performed by: INTERNAL MEDICINE

## 2021-02-01 PROCEDURE — 93010 EKG 12-LEAD: ICD-10-PCS | Mod: S$GLB,,, | Performed by: INTERNAL MEDICINE

## 2021-02-01 PROCEDURE — 99204 OFFICE O/P NEW MOD 45 MIN: CPT | Mod: S$GLB,,, | Performed by: INTERNAL MEDICINE

## 2021-02-01 PROCEDURE — 99204 PR OFFICE/OUTPT VISIT, NEW, LEVL IV, 45-59 MIN: ICD-10-PCS | Mod: S$GLB,,, | Performed by: INTERNAL MEDICINE

## 2021-02-01 PROCEDURE — 99999 PR PBB SHADOW E&M-EST. PATIENT-LVL III: ICD-10-PCS | Mod: PBBFAC,,, | Performed by: INTERNAL MEDICINE

## 2021-02-01 PROCEDURE — 99999 PR PBB SHADOW E&M-EST. PATIENT-LVL III: CPT | Mod: PBBFAC,,, | Performed by: INTERNAL MEDICINE

## 2021-02-01 RX ORDER — OLMESARTAN MEDOXOMIL, AMLODIPINE AND HYDROCHLOROTHIAZIDE TABLET 20/5/12.5 MG 20; 5; 12.5 MG/1; MG/1; MG/1
TABLET ORAL
COMMUNITY
Start: 2020-12-01 | End: 2021-07-15 | Stop reason: CLARIF

## 2021-02-08 ENCOUNTER — HOSPITAL ENCOUNTER (OUTPATIENT)
Dept: RADIOLOGY | Facility: OTHER | Age: 61
Discharge: HOME | End: 2021-02-08
Attending: INTERNAL MEDICINE
Payer: COMMERCIAL

## 2021-02-08 ENCOUNTER — HOSPITAL ENCOUNTER (OUTPATIENT)
Dept: CARDIOLOGY | Facility: OTHER | Age: 61
Discharge: HOME | End: 2021-02-08
Attending: INTERNAL MEDICINE
Payer: COMMERCIAL

## 2021-02-08 VITALS
WEIGHT: 235 LBS | HEART RATE: 75 BPM | DIASTOLIC BLOOD PRESSURE: 80 MMHG | SYSTOLIC BLOOD PRESSURE: 124 MMHG | HEIGHT: 74 IN | BODY MASS INDEX: 30.16 KG/M2

## 2021-02-08 DIAGNOSIS — R07.2 PRECORDIAL PAIN: ICD-10-CM

## 2021-02-08 LAB
CV STRESS BASE HR: 75 BPM
DIASTOLIC BLOOD PRESSURE: 80 MMHG
NUC REST EJECTION FRACTION: 49
NUC STRESS EJECTION FRACTION: 65 %
OHS CV CPX 85 PERCENT MAX PREDICTED HEART RATE MALE: 136
OHS CV CPX MAX PREDICTED HEART RATE: 160
OHS CV CPX PATIENT IS FEMALE: 0
OHS CV CPX PATIENT IS MALE: 1
OHS CV CPX PEAK DIASTOLIC BLOOD PRESSURE: 80 MMHG
OHS CV CPX PEAK HEAR RATE: 100 BPM
OHS CV CPX PEAK RATE PRESSURE PRODUCT: NORMAL
OHS CV CPX PEAK SYSTOLIC BLOOD PRESSURE: 124 MMHG
OHS CV CPX PERCENT MAX PREDICTED HEART RATE ACHIEVED: 63
OHS CV CPX RATE PRESSURE PRODUCT PRESENTING: 9300
SYSTOLIC BLOOD PRESSURE: 124 MMHG

## 2021-02-08 PROCEDURE — 93018 CV STRESS TEST I&R ONLY: CPT | Mod: ,,, | Performed by: INTERNAL MEDICINE

## 2021-02-08 PROCEDURE — 78452 HT MUSCLE IMAGE SPECT MULT: CPT | Mod: 26,,, | Performed by: INTERNAL MEDICINE

## 2021-02-08 PROCEDURE — 93016 STRESS TEST WITH MYOCARDIAL PERFUSION (CUPID ONLY): ICD-10-PCS | Mod: ,,, | Performed by: INTERNAL MEDICINE

## 2021-02-08 PROCEDURE — 93018 STRESS TEST WITH MYOCARDIAL PERFUSION (CUPID ONLY): ICD-10-PCS | Mod: ,,, | Performed by: INTERNAL MEDICINE

## 2021-02-08 PROCEDURE — 78452 STRESS TEST WITH MYOCARDIAL PERFUSION (CUPID ONLY): ICD-10-PCS | Mod: 26,,, | Performed by: INTERNAL MEDICINE

## 2021-02-08 PROCEDURE — 93016 CV STRESS TEST SUPVJ ONLY: CPT | Mod: ,,, | Performed by: INTERNAL MEDICINE

## 2021-02-10 ENCOUNTER — OFFICE VISIT (OUTPATIENT)
Dept: DERMATOLOGY | Facility: CLINIC | Age: 61
End: 2021-02-10
Payer: COMMERCIAL

## 2021-02-10 DIAGNOSIS — L29.9 PRURITUS: ICD-10-CM

## 2021-02-10 DIAGNOSIS — L98.9 DISEASE OF SKIN AND SUBCUTANEOUS TISSUE: Primary | ICD-10-CM

## 2021-02-10 PROCEDURE — 11104 PR PUNCH BIOPSY, SKIN, SINGLE LESION: ICD-10-PCS | Mod: S$GLB,,, | Performed by: DERMATOLOGY

## 2021-02-10 PROCEDURE — 88305 TISSUE EXAM BY PATHOLOGIST: CPT | Performed by: PATHOLOGY

## 2021-02-10 PROCEDURE — 99999 PR PBB SHADOW E&M-EST. PATIENT-LVL III: ICD-10-PCS | Mod: PBBFAC,,, | Performed by: DERMATOLOGY

## 2021-02-10 PROCEDURE — 1126F AMNT PAIN NOTED NONE PRSNT: CPT | Mod: S$GLB,,, | Performed by: DERMATOLOGY

## 2021-02-10 PROCEDURE — 88312 PR  SPECIAL STAINS,GROUP I: ICD-10-PCS | Mod: 26,,, | Performed by: PATHOLOGY

## 2021-02-10 PROCEDURE — 88305 TISSUE EXAM BY PATHOLOGIST: CPT | Mod: 26,,, | Performed by: PATHOLOGY

## 2021-02-10 PROCEDURE — 11104 PUNCH BX SKIN SINGLE LESION: CPT | Mod: S$GLB,,, | Performed by: DERMATOLOGY

## 2021-02-10 PROCEDURE — 99213 PR OFFICE/OUTPT VISIT, EST, LEVL III, 20-29 MIN: ICD-10-PCS | Mod: 25,S$GLB,, | Performed by: DERMATOLOGY

## 2021-02-10 PROCEDURE — 99999 PR PBB SHADOW E&M-EST. PATIENT-LVL III: CPT | Mod: PBBFAC,,, | Performed by: DERMATOLOGY

## 2021-02-10 PROCEDURE — 88312 SPECIAL STAINS GROUP 1: CPT | Mod: 26,,, | Performed by: PATHOLOGY

## 2021-02-10 PROCEDURE — 88312 SPECIAL STAINS GROUP 1: CPT | Mod: 59 | Performed by: PATHOLOGY

## 2021-02-10 PROCEDURE — 1126F PR PAIN SEVERITY QUANTIFIED, NO PAIN PRESENT: ICD-10-PCS | Mod: S$GLB,,, | Performed by: DERMATOLOGY

## 2021-02-10 PROCEDURE — 99213 OFFICE O/P EST LOW 20 MIN: CPT | Mod: 25,S$GLB,, | Performed by: DERMATOLOGY

## 2021-02-10 PROCEDURE — 88305 TISSUE EXAM BY PATHOLOGIST: ICD-10-PCS | Mod: 26,,, | Performed by: PATHOLOGY

## 2021-02-10 RX ORDER — TACROLIMUS 1 MG/G
OINTMENT TOPICAL 2 TIMES DAILY
Qty: 30 G | Refills: 1 | Status: SHIPPED | OUTPATIENT
Start: 2021-02-10 | End: 2021-02-24 | Stop reason: SDUPTHER

## 2021-02-18 LAB
FINAL PATHOLOGIC DIAGNOSIS: NORMAL
GROSS: NORMAL
MICROSCOPIC EXAM: NORMAL

## 2021-02-24 ENCOUNTER — OFFICE VISIT (OUTPATIENT)
Dept: DERMATOLOGY | Facility: CLINIC | Age: 61
End: 2021-02-24
Payer: COMMERCIAL

## 2021-02-24 DIAGNOSIS — L23.9 DERMAL HYPERSENSITIVITY REACTION: Primary | ICD-10-CM

## 2021-02-24 PROCEDURE — 99999 PR PBB SHADOW E&M-EST. PATIENT-LVL I: CPT | Mod: PBBFAC,,, | Performed by: DERMATOLOGY

## 2021-02-24 PROCEDURE — 99213 OFFICE O/P EST LOW 20 MIN: CPT | Mod: S$GLB,,, | Performed by: DERMATOLOGY

## 2021-02-24 PROCEDURE — 99213 PR OFFICE/OUTPT VISIT, EST, LEVL III, 20-29 MIN: ICD-10-PCS | Mod: S$GLB,,, | Performed by: DERMATOLOGY

## 2021-02-24 PROCEDURE — 99999 PR PBB SHADOW E&M-EST. PATIENT-LVL I: ICD-10-PCS | Mod: PBBFAC,,, | Performed by: DERMATOLOGY

## 2021-03-12 ENCOUNTER — TELEPHONE (OUTPATIENT)
Dept: DERMATOLOGY | Facility: CLINIC | Age: 61
End: 2021-03-12

## 2021-03-12 DIAGNOSIS — L29.9 PRURITUS: ICD-10-CM

## 2021-03-12 DIAGNOSIS — L73.9 FOLLICULITIS: ICD-10-CM

## 2021-03-12 RX ORDER — CLINDAMYCIN PHOSPHATE 10 UG/ML
LOTION TOPICAL
Qty: 60 ML | Refills: 3 | Status: SHIPPED | OUTPATIENT
Start: 2021-03-12 | End: 2021-07-15 | Stop reason: CLARIF

## 2021-03-12 RX ORDER — TACROLIMUS 1 MG/G
OINTMENT TOPICAL 2 TIMES DAILY
Qty: 30 G | Refills: 1 | Status: SHIPPED | OUTPATIENT
Start: 2021-03-12 | End: 2021-07-15 | Stop reason: CLARIF

## 2021-04-02 NOTE — PATIENT INSTRUCTIONS
Needs appt, also needs to establish with  A PCP Recommend looking into our Functional Restoration Program here at [facility].  This is the intensive outpatient program that has physical therapy, occupational therapy, and education on how pain affects sleep, mood, nutrition, and how these things affect pain.  This program is 4 days per week for 4 weeks.  Our web site is:    [email or web address]    Their phone number is [phone].  I recommend attending the screening visits at least.     Recommend starting meloxicam 7.5 mg daily.  If needed, you may increase this to 15 mg daily after 1 week.  Stay at most comfortable dose.  Take medication with meals.  If you experience any upset stomach, nausea, or vomiting, stop taking this medication. Do not take this with any other medications in the NSAID family, such as ibuprofen, aspirin, and naproxen. A topical NSAID gel is fine to use.    Recommend starting methocarbamol 500-1000 mg at bedtime for pain as needed for muscle spasm/pain. IThis medication may make you sleepy, so do not drive until you know how it affect you.     Decrease Percocet to twice daily as needed for 7 days  Then decrease Percocet to once daily for 7 days  Then decrease Percocet to 0.5 tablets once daily for 7 days  Then you can safely stop this medication.

## 2021-06-21 ENCOUNTER — LAB VISIT (OUTPATIENT)
Dept: LAB | Facility: OTHER | Age: 61
End: 2021-06-21
Attending: INTERNAL MEDICINE
Payer: COMMERCIAL

## 2021-06-21 DIAGNOSIS — R58 HEMORRHAGE OF BLOOD VESSEL: Primary | ICD-10-CM

## 2021-06-21 LAB
BASOPHILS # BLD AUTO: 0.08 K/UL (ref 0–0.2)
BASOPHILS NFR BLD: 1 % (ref 0–1.9)
DIFFERENTIAL METHOD: ABNORMAL
EOSINOPHIL # BLD AUTO: 0.8 K/UL (ref 0–0.5)
EOSINOPHIL NFR BLD: 9.7 % (ref 0–8)
ERYTHROCYTE [DISTWIDTH] IN BLOOD BY AUTOMATED COUNT: 14.3 % (ref 11.5–14.5)
HCT VFR BLD AUTO: 50 % (ref 40–54)
HGB BLD-MCNC: 16.2 G/DL (ref 14–18)
IMM GRANULOCYTES # BLD AUTO: 0.02 K/UL (ref 0–0.04)
IMM GRANULOCYTES NFR BLD AUTO: 0.2 % (ref 0–0.5)
LYMPHOCYTES # BLD AUTO: 2.5 K/UL (ref 1–4.8)
LYMPHOCYTES NFR BLD: 30 % (ref 18–48)
MCH RBC QN AUTO: 27 PG (ref 27–31)
MCHC RBC AUTO-ENTMCNC: 32.4 G/DL (ref 32–36)
MCV RBC AUTO: 83 FL (ref 82–98)
MONOCYTES # BLD AUTO: 1.1 K/UL (ref 0.3–1)
MONOCYTES NFR BLD: 14 % (ref 4–15)
NEUTROPHILS # BLD AUTO: 3.7 K/UL (ref 1.8–7.7)
NEUTROPHILS NFR BLD: 45.1 % (ref 38–73)
NRBC BLD-RTO: 0 /100 WBC
PLATELET # BLD AUTO: 198 K/UL (ref 150–450)
PMV BLD AUTO: 10.6 FL (ref 9.2–12.9)
RBC # BLD AUTO: 6.01 M/UL (ref 4.6–6.2)
WBC # BLD AUTO: 8.16 K/UL (ref 3.9–12.7)

## 2021-06-21 PROCEDURE — 36415 COLL VENOUS BLD VENIPUNCTURE: CPT | Performed by: INTERNAL MEDICINE

## 2021-06-21 PROCEDURE — 85025 COMPLETE CBC W/AUTO DIFF WBC: CPT | Performed by: INTERNAL MEDICINE

## 2021-06-25 NOTE — TELEPHONE ENCOUNTER
Left message for patient to call office back .  ----- Message from Dolores Rodriguez sent at 7/15/2019 11:36 AM CDT -----  Contact: [phone]/self  Patient is requesting a callback. Please advise.      
No

## 2021-07-01 ENCOUNTER — HOSPITAL ENCOUNTER (OUTPATIENT)
Dept: RADIOLOGY | Facility: OTHER | Age: 61
Discharge: HOME | End: 2021-07-01
Attending: INTERNAL MEDICINE
Payer: COMMERCIAL

## 2021-07-01 DIAGNOSIS — R10.9 LEFT FLANK PAIN: ICD-10-CM

## 2021-07-01 DIAGNOSIS — K57.30 DIVERTICULOSIS OF LARGE INTESTINE WITHOUT PERFORATION OR ABSCESS WITHOUT BLEEDING: ICD-10-CM

## 2021-07-01 PROCEDURE — 25500020 PHARM REV CODE 255: Performed by: INTERNAL MEDICINE

## 2021-07-01 PROCEDURE — 74177 CT ABDOMEN PELVIS WITH CONTRAST: ICD-10-PCS | Mod: 26,,, | Performed by: RADIOLOGY

## 2021-07-01 PROCEDURE — A9698 NON-RAD CONTRAST MATERIALNOC: HCPCS | Performed by: INTERNAL MEDICINE

## 2021-07-01 PROCEDURE — 74177 CT ABD & PELVIS W/CONTRAST: CPT | Mod: 26,,, | Performed by: RADIOLOGY

## 2021-07-01 PROCEDURE — 74177 CT ABD & PELVIS W/CONTRAST: CPT | Mod: TC

## 2021-07-01 RX ADMIN — IOHEXOL 100 ML: 350 INJECTION, SOLUTION INTRAVENOUS at 09:07

## 2021-07-01 RX ADMIN — IOHEXOL 1000 ML: 12 SOLUTION ORAL at 08:07

## 2021-07-15 ENCOUNTER — ANESTHESIA EVENT (OUTPATIENT)
Dept: SURGERY | Facility: OTHER | Age: 61
End: 2021-07-15
Payer: MEDICARE

## 2021-07-15 ENCOUNTER — HOSPITAL ENCOUNTER (OUTPATIENT)
Dept: PREADMISSION TESTING | Facility: OTHER | Age: 61
Discharge: HOME | End: 2021-07-15
Attending: ORTHOPAEDIC SURGERY
Payer: COMMERCIAL

## 2021-07-15 VITALS
OXYGEN SATURATION: 97 % | WEIGHT: 230 LBS | TEMPERATURE: 98 F | HEART RATE: 68 BPM | BODY MASS INDEX: 29.52 KG/M2 | SYSTOLIC BLOOD PRESSURE: 144 MMHG | DIASTOLIC BLOOD PRESSURE: 102 MMHG | HEIGHT: 74 IN

## 2021-07-15 DIAGNOSIS — Z01.818 PREOP TESTING: Primary | ICD-10-CM

## 2021-07-15 LAB
ABO + RH BLD: NORMAL
ALBUMIN SERPL BCP-MCNC: 4 G/DL (ref 3.5–5.2)
ALP SERPL-CCNC: 78 U/L (ref 55–135)
ALT SERPL W/O P-5'-P-CCNC: 23 U/L (ref 10–44)
ANION GAP SERPL CALC-SCNC: 9 MMOL/L (ref 8–16)
AST SERPL-CCNC: 19 U/L (ref 10–40)
BASOPHILS # BLD AUTO: 0.1 K/UL (ref 0–0.2)
BASOPHILS NFR BLD: 1.1 % (ref 0–1.9)
BILIRUB SERPL-MCNC: 0.4 MG/DL (ref 0.1–1)
BILIRUB UR QL STRIP: NEGATIVE
BLD GP AB SCN CELLS X3 SERPL QL: NORMAL
BLOOD GROUP ANTIBODIES SERPL: NORMAL
BUN SERPL-MCNC: 14 MG/DL (ref 8–23)
CALCIUM SERPL-MCNC: 9.5 MG/DL (ref 8.7–10.5)
CHLORIDE SERPL-SCNC: 106 MMOL/L (ref 95–110)
CLARITY UR: CLEAR
CO2 SERPL-SCNC: 21 MMOL/L (ref 23–29)
COLOR UR: YELLOW
CREAT SERPL-MCNC: 0.9 MG/DL (ref 0.5–1.4)
DIFFERENTIAL METHOD: ABNORMAL
EOSINOPHIL # BLD AUTO: 0.8 K/UL (ref 0–0.5)
EOSINOPHIL NFR BLD: 8.4 % (ref 0–8)
ERYTHROCYTE [DISTWIDTH] IN BLOOD BY AUTOMATED COUNT: 15.2 % (ref 11.5–14.5)
EST. GFR  (AFRICAN AMERICAN): >60 ML/MIN/1.73 M^2
EST. GFR  (NON AFRICAN AMERICAN): >60 ML/MIN/1.73 M^2
GLUCOSE SERPL-MCNC: 78 MG/DL (ref 70–110)
GLUCOSE UR QL STRIP: NEGATIVE
HCT VFR BLD AUTO: 48.5 % (ref 40–54)
HGB BLD-MCNC: 16 G/DL (ref 14–18)
HGB UR QL STRIP: NEGATIVE
IMM GRANULOCYTES # BLD AUTO: 0.03 K/UL (ref 0–0.04)
IMM GRANULOCYTES NFR BLD AUTO: 0.3 % (ref 0–0.5)
KETONES UR QL STRIP: NEGATIVE
LEUKOCYTE ESTERASE UR QL STRIP: NEGATIVE
LYMPHOCYTES # BLD AUTO: 2.5 K/UL (ref 1–4.8)
LYMPHOCYTES NFR BLD: 27.5 % (ref 18–48)
MCH RBC QN AUTO: 27.4 PG (ref 27–31)
MCHC RBC AUTO-ENTMCNC: 33 G/DL (ref 32–36)
MCV RBC AUTO: 83 FL (ref 82–98)
MONOCYTES # BLD AUTO: 1.2 K/UL (ref 0.3–1)
MONOCYTES NFR BLD: 12.6 % (ref 4–15)
NEUTROPHILS # BLD AUTO: 4.6 K/UL (ref 1.8–7.7)
NEUTROPHILS NFR BLD: 50.1 % (ref 38–73)
NITRITE UR QL STRIP: NEGATIVE
NRBC BLD-RTO: 0 /100 WBC
PH UR STRIP: 6 [PH] (ref 5–8)
PLATELET # BLD AUTO: 174 K/UL (ref 150–450)
PMV BLD AUTO: 10.9 FL (ref 9.2–12.9)
POTASSIUM SERPL-SCNC: 4.3 MMOL/L (ref 3.5–5.1)
PROT SERPL-MCNC: 7.8 G/DL (ref 6–8.4)
PROT UR QL STRIP: NEGATIVE
RBC # BLD AUTO: 5.85 M/UL (ref 4.6–6.2)
SODIUM SERPL-SCNC: 136 MMOL/L (ref 136–145)
SP GR UR STRIP: 1.02 (ref 1–1.03)
URN SPEC COLLECT METH UR: NORMAL
UROBILINOGEN UR STRIP-ACNC: NEGATIVE EU/DL
WBC # BLD AUTO: 9.22 K/UL (ref 3.9–12.7)

## 2021-07-15 PROCEDURE — 86870 RBC ANTIBODY IDENTIFICATION: CPT | Performed by: ORTHOPAEDIC SURGERY

## 2021-07-15 PROCEDURE — 36415 COLL VENOUS BLD VENIPUNCTURE: CPT | Performed by: ORTHOPAEDIC SURGERY

## 2021-07-15 PROCEDURE — 85025 COMPLETE CBC W/AUTO DIFF WBC: CPT | Performed by: ORTHOPAEDIC SURGERY

## 2021-07-15 PROCEDURE — 80053 COMPREHEN METABOLIC PANEL: CPT | Performed by: ORTHOPAEDIC SURGERY

## 2021-07-15 PROCEDURE — 81003 URINALYSIS AUTO W/O SCOPE: CPT | Performed by: ORTHOPAEDIC SURGERY

## 2021-07-15 PROCEDURE — 86900 BLOOD TYPING SEROLOGIC ABO: CPT | Performed by: ORTHOPAEDIC SURGERY

## 2021-07-15 RX ORDER — SODIUM CHLORIDE, SODIUM LACTATE, POTASSIUM CHLORIDE, CALCIUM CHLORIDE 600; 310; 30; 20 MG/100ML; MG/100ML; MG/100ML; MG/100ML
INJECTION, SOLUTION INTRAVENOUS CONTINUOUS
Status: CANCELLED | OUTPATIENT
Start: 2021-07-15

## 2021-07-26 ENCOUNTER — HOSPITAL ENCOUNTER (OUTPATIENT)
Facility: OTHER | Age: 61
Discharge: HOME HEALTH | End: 2021-07-27
Attending: ORTHOPAEDIC SURGERY | Admitting: ORTHOPAEDIC SURGERY
Payer: MEDICAID

## 2021-07-26 ENCOUNTER — ANESTHESIA (OUTPATIENT)
Dept: SURGERY | Facility: OTHER | Age: 61
End: 2021-07-26
Payer: MEDICARE

## 2021-07-26 DIAGNOSIS — M17.12 PRIMARY OSTEOARTHRITIS OF LEFT KNEE: Primary | ICD-10-CM

## 2021-07-26 DIAGNOSIS — M17.12 OSTEOARTHRITIS OF LEFT KNEE: ICD-10-CM

## 2021-07-26 LAB
POCT GLUCOSE: 109 MG/DL (ref 70–110)
POCT GLUCOSE: 134 MG/DL (ref 70–110)

## 2021-07-26 PROCEDURE — C1713 ANCHOR/SCREW BN/BN,TIS/BN: HCPCS | Performed by: ORTHOPAEDIC SURGERY

## 2021-07-26 PROCEDURE — 36000710: Performed by: ORTHOPAEDIC SURGERY

## 2021-07-26 PROCEDURE — 25000003 PHARM REV CODE 250: Performed by: ANESTHESIOLOGY

## 2021-07-26 PROCEDURE — 63600175 PHARM REV CODE 636 W HCPCS: Performed by: ORTHOPAEDIC SURGERY

## 2021-07-26 PROCEDURE — 37000009 HC ANESTHESIA EA ADD 15 MINS: Performed by: ORTHOPAEDIC SURGERY

## 2021-07-26 PROCEDURE — C1776 JOINT DEVICE (IMPLANTABLE): HCPCS | Performed by: ORTHOPAEDIC SURGERY

## 2021-07-26 PROCEDURE — 63600175 PHARM REV CODE 636 W HCPCS: Performed by: NURSE ANESTHETIST, CERTIFIED REGISTERED

## 2021-07-26 PROCEDURE — 97162 PT EVAL MOD COMPLEX 30 MIN: CPT

## 2021-07-26 PROCEDURE — 71000039 HC RECOVERY, EACH ADD'L HOUR: Performed by: ORTHOPAEDIC SURGERY

## 2021-07-26 PROCEDURE — 25000003 PHARM REV CODE 250: Performed by: ORTHOPAEDIC SURGERY

## 2021-07-26 PROCEDURE — 63600175 PHARM REV CODE 636 W HCPCS: Performed by: ANESTHESIOLOGY

## 2021-07-26 PROCEDURE — 36000711: Performed by: ORTHOPAEDIC SURGERY

## 2021-07-26 PROCEDURE — 25000003 PHARM REV CODE 250

## 2021-07-26 PROCEDURE — C9290 INJ, BUPIVACAINE LIPOSOME: HCPCS | Performed by: ORTHOPAEDIC SURGERY

## 2021-07-26 PROCEDURE — 71000033 HC RECOVERY, INTIAL HOUR: Performed by: ORTHOPAEDIC SURGERY

## 2021-07-26 PROCEDURE — 25000003 PHARM REV CODE 250: Performed by: NURSE PRACTITIONER

## 2021-07-26 PROCEDURE — 25000003 PHARM REV CODE 250: Performed by: NURSE ANESTHETIST, CERTIFIED REGISTERED

## 2021-07-26 PROCEDURE — 37000008 HC ANESTHESIA 1ST 15 MINUTES: Performed by: ORTHOPAEDIC SURGERY

## 2021-07-26 PROCEDURE — 27201423 OPTIME MED/SURG SUP & DEVICES STERILE SUPPLY: Performed by: ORTHOPAEDIC SURGERY

## 2021-07-26 PROCEDURE — 97116 GAIT TRAINING THERAPY: CPT

## 2021-07-26 DEVICE — CEMENT REFOBACIN BCR 1X40: Type: IMPLANTABLE DEVICE | Site: KNEE | Status: FUNCTIONAL

## 2021-07-26 DEVICE — BASEPLATE TIB SZ F 5 DEG LEFT: Type: IMPLANTABLE DEVICE | Site: KNEE | Status: FUNCTIONAL

## 2021-07-26 RX ORDER — SODIUM CHLORIDE 0.9 % (FLUSH) 0.9 %
3 SYRINGE (ML) INJECTION EVERY 6 HOURS PRN
Status: DISCONTINUED | OUTPATIENT
Start: 2021-07-26 | End: 2021-07-27 | Stop reason: HOSPADM

## 2021-07-26 RX ORDER — METFORMIN HYDROCHLORIDE 500 MG/1
500 TABLET, EXTENDED RELEASE ORAL
Status: DISCONTINUED | OUTPATIENT
Start: 2021-07-27 | End: 2021-07-27 | Stop reason: HOSPADM

## 2021-07-26 RX ORDER — IBUPROFEN 200 MG
24 TABLET ORAL
Status: DISCONTINUED | OUTPATIENT
Start: 2021-07-26 | End: 2021-07-27 | Stop reason: HOSPADM

## 2021-07-26 RX ORDER — HYDROMORPHONE HYDROCHLORIDE 1 MG/ML
0.5 INJECTION, SOLUTION INTRAMUSCULAR; INTRAVENOUS; SUBCUTANEOUS EVERY 4 HOURS PRN
Status: DISCONTINUED | OUTPATIENT
Start: 2021-07-26 | End: 2021-07-27 | Stop reason: HOSPADM

## 2021-07-26 RX ORDER — DIPHENHYDRAMINE HYDROCHLORIDE 50 MG/ML
25 INJECTION INTRAMUSCULAR; INTRAVENOUS EVERY 6 HOURS PRN
Status: DISCONTINUED | OUTPATIENT
Start: 2021-07-26 | End: 2021-07-27 | Stop reason: HOSPADM

## 2021-07-26 RX ORDER — HYDROMORPHONE HYDROCHLORIDE 2 MG/ML
0.4 INJECTION, SOLUTION INTRAMUSCULAR; INTRAVENOUS; SUBCUTANEOUS EVERY 5 MIN PRN
Status: DISCONTINUED | OUTPATIENT
Start: 2021-07-26 | End: 2021-07-26 | Stop reason: HOSPADM

## 2021-07-26 RX ORDER — CARVEDILOL 6.25 MG/1
6.25 TABLET ORAL 2 TIMES DAILY WITH MEALS
Status: DISCONTINUED | OUTPATIENT
Start: 2021-07-26 | End: 2021-07-27 | Stop reason: HOSPADM

## 2021-07-26 RX ORDER — CELECOXIB 200 MG/1
200 CAPSULE ORAL
Status: DISCONTINUED | OUTPATIENT
Start: 2021-07-26 | End: 2021-07-27 | Stop reason: HOSPADM

## 2021-07-26 RX ORDER — MEPERIDINE HYDROCHLORIDE 50 MG/ML
12.5 INJECTION INTRAMUSCULAR; INTRAVENOUS; SUBCUTANEOUS ONCE
Status: DISCONTINUED | OUTPATIENT
Start: 2021-07-26 | End: 2021-07-26

## 2021-07-26 RX ORDER — METOCLOPRAMIDE HYDROCHLORIDE 5 MG/ML
5 INJECTION INTRAMUSCULAR; INTRAVENOUS EVERY 6 HOURS PRN
Status: DISCONTINUED | OUTPATIENT
Start: 2021-07-26 | End: 2021-07-27 | Stop reason: HOSPADM

## 2021-07-26 RX ORDER — SODIUM CHLORIDE 0.9 % (FLUSH) 0.9 %
3 SYRINGE (ML) INJECTION
Status: DISCONTINUED | OUTPATIENT
Start: 2021-07-26 | End: 2021-07-27 | Stop reason: HOSPADM

## 2021-07-26 RX ORDER — LIDOCAINE HYDROCHLORIDE 20 MG/ML
INJECTION INTRAVENOUS
Status: DISCONTINUED | OUTPATIENT
Start: 2021-07-26 | End: 2021-07-26

## 2021-07-26 RX ORDER — SODIUM CHLORIDE, SODIUM LACTATE, POTASSIUM CHLORIDE, CALCIUM CHLORIDE 600; 310; 30; 20 MG/100ML; MG/100ML; MG/100ML; MG/100ML
INJECTION, SOLUTION INTRAVENOUS CONTINUOUS
Status: DISCONTINUED | OUTPATIENT
Start: 2021-07-26 | End: 2021-07-27 | Stop reason: HOSPADM

## 2021-07-26 RX ORDER — HYDRALAZINE HYDROCHLORIDE 20 MG/ML
10 INJECTION INTRAMUSCULAR; INTRAVENOUS EVERY 8 HOURS PRN
Status: DISCONTINUED | OUTPATIENT
Start: 2021-07-26 | End: 2021-07-27 | Stop reason: HOSPADM

## 2021-07-26 RX ORDER — NAPROXEN SODIUM 220 MG/1
81 TABLET, FILM COATED ORAL 2 TIMES DAILY
Status: DISCONTINUED | OUTPATIENT
Start: 2021-07-27 | End: 2021-07-27 | Stop reason: HOSPADM

## 2021-07-26 RX ORDER — HYDRALAZINE HYDROCHLORIDE 20 MG/ML
INJECTION INTRAMUSCULAR; INTRAVENOUS
Status: DISCONTINUED | OUTPATIENT
Start: 2021-07-26 | End: 2021-07-26

## 2021-07-26 RX ORDER — OXYCODONE HYDROCHLORIDE 5 MG/1
10 TABLET ORAL EVERY 4 HOURS PRN
Status: DISCONTINUED | OUTPATIENT
Start: 2021-07-26 | End: 2021-07-27 | Stop reason: HOSPADM

## 2021-07-26 RX ORDER — ATORVASTATIN CALCIUM 20 MG/1
40 TABLET, FILM COATED ORAL DAILY
Status: DISCONTINUED | OUTPATIENT
Start: 2021-07-27 | End: 2021-07-27 | Stop reason: HOSPADM

## 2021-07-26 RX ORDER — IBUPROFEN 200 MG
16 TABLET ORAL
Status: DISCONTINUED | OUTPATIENT
Start: 2021-07-26 | End: 2021-07-27 | Stop reason: HOSPADM

## 2021-07-26 RX ORDER — ESMOLOL HYDROCHLORIDE 10 MG/ML
INJECTION INTRAVENOUS
Status: DISCONTINUED | OUTPATIENT
Start: 2021-07-26 | End: 2021-07-26

## 2021-07-26 RX ORDER — OXYCODONE HYDROCHLORIDE 5 MG/1
5 TABLET ORAL
Status: DISCONTINUED | OUTPATIENT
Start: 2021-07-26 | End: 2021-07-26 | Stop reason: HOSPADM

## 2021-07-26 RX ORDER — CEFAZOLIN SODIUM 1 G/3ML
2 INJECTION, POWDER, FOR SOLUTION INTRAMUSCULAR; INTRAVENOUS
Status: ACTIVE | OUTPATIENT
Start: 2021-07-26 | End: 2021-07-26

## 2021-07-26 RX ORDER — OXYCODONE HYDROCHLORIDE 5 MG/1
5 TABLET ORAL
Status: CANCELLED | OUTPATIENT
Start: 2021-07-26

## 2021-07-26 RX ORDER — DEXTROSE MONOHYDRATE AND SODIUM CHLORIDE 5; .9 G/100ML; G/100ML
INJECTION, SOLUTION INTRAVENOUS CONTINUOUS
Status: DISCONTINUED | OUTPATIENT
Start: 2021-07-26 | End: 2021-07-26

## 2021-07-26 RX ORDER — ACETAMINOPHEN 500 MG
1000 TABLET ORAL EVERY 8 HOURS
Status: DISCONTINUED | OUTPATIENT
Start: 2021-07-26 | End: 2021-07-27 | Stop reason: HOSPADM

## 2021-07-26 RX ORDER — MEPERIDINE HYDROCHLORIDE 25 MG/ML
12.5 INJECTION INTRAMUSCULAR; INTRAVENOUS; SUBCUTANEOUS ONCE
Status: COMPLETED | OUTPATIENT
Start: 2021-07-26 | End: 2021-07-26

## 2021-07-26 RX ORDER — SODIUM CHLORIDE 9 MG/ML
INJECTION, SOLUTION INTRAVENOUS CONTINUOUS
Status: DISCONTINUED | OUTPATIENT
Start: 2021-07-26 | End: 2021-07-27 | Stop reason: HOSPADM

## 2021-07-26 RX ORDER — DIPHENHYDRAMINE HYDROCHLORIDE 50 MG/ML
12.5 INJECTION INTRAMUSCULAR; INTRAVENOUS EVERY 30 MIN PRN
Status: CANCELLED | OUTPATIENT
Start: 2021-07-26

## 2021-07-26 RX ORDER — POLYETHYLENE GLYCOL 3350 17 G/17G
17 POWDER, FOR SOLUTION ORAL DAILY
Status: DISCONTINUED | OUTPATIENT
Start: 2021-07-27 | End: 2021-07-27 | Stop reason: HOSPADM

## 2021-07-26 RX ORDER — GLUCAGON 1 MG
1 KIT INJECTION
Status: DISCONTINUED | OUTPATIENT
Start: 2021-07-26 | End: 2021-07-27 | Stop reason: HOSPADM

## 2021-07-26 RX ORDER — MUPIROCIN 20 MG/G
1 OINTMENT TOPICAL 2 TIMES DAILY
Status: DISCONTINUED | OUTPATIENT
Start: 2021-07-26 | End: 2021-07-27 | Stop reason: HOSPADM

## 2021-07-26 RX ORDER — ROCURONIUM BROMIDE 10 MG/ML
INJECTION, SOLUTION INTRAVENOUS
Status: DISCONTINUED | OUTPATIENT
Start: 2021-07-26 | End: 2021-07-26

## 2021-07-26 RX ORDER — SODIUM CHLORIDE 0.9 % (FLUSH) 0.9 %
3 SYRINGE (ML) INJECTION
Status: CANCELLED | OUTPATIENT
Start: 2021-07-26

## 2021-07-26 RX ORDER — PROPOFOL 10 MG/ML
VIAL (ML) INTRAVENOUS
Status: DISCONTINUED | OUTPATIENT
Start: 2021-07-26 | End: 2021-07-26

## 2021-07-26 RX ORDER — MIDAZOLAM HYDROCHLORIDE 1 MG/ML
INJECTION INTRAMUSCULAR; INTRAVENOUS
Status: DISCONTINUED | OUTPATIENT
Start: 2021-07-26 | End: 2021-07-26

## 2021-07-26 RX ORDER — DIPHENHYDRAMINE HYDROCHLORIDE 50 MG/ML
12.5 INJECTION INTRAMUSCULAR; INTRAVENOUS EVERY 30 MIN PRN
Status: DISCONTINUED | OUTPATIENT
Start: 2021-07-26 | End: 2021-07-26 | Stop reason: HOSPADM

## 2021-07-26 RX ORDER — HYDROMORPHONE HYDROCHLORIDE 2 MG/ML
0.4 INJECTION, SOLUTION INTRAMUSCULAR; INTRAVENOUS; SUBCUTANEOUS EVERY 5 MIN PRN
Status: CANCELLED | OUTPATIENT
Start: 2021-07-26

## 2021-07-26 RX ORDER — INSULIN ASPART 100 [IU]/ML
0-5 INJECTION, SOLUTION INTRAVENOUS; SUBCUTANEOUS
Status: DISCONTINUED | OUTPATIENT
Start: 2021-07-26 | End: 2021-07-27 | Stop reason: HOSPADM

## 2021-07-26 RX ORDER — FENTANYL CITRATE 50 UG/ML
INJECTION, SOLUTION INTRAMUSCULAR; INTRAVENOUS
Status: DISCONTINUED | OUTPATIENT
Start: 2021-07-26 | End: 2021-07-26

## 2021-07-26 RX ORDER — ACETAMINOPHEN 500 MG
1000 TABLET ORAL
Status: COMPLETED | OUTPATIENT
Start: 2021-07-26 | End: 2021-07-26

## 2021-07-26 RX ORDER — PROCHLORPERAZINE EDISYLATE 5 MG/ML
5 INJECTION INTRAMUSCULAR; INTRAVENOUS EVERY 30 MIN PRN
Status: CANCELLED | OUTPATIENT
Start: 2021-07-26

## 2021-07-26 RX ORDER — TRANEXAMIC ACID 100 MG/ML
INJECTION, SOLUTION INTRAVENOUS
Status: DISCONTINUED | OUTPATIENT
Start: 2021-07-26 | End: 2021-07-26

## 2021-07-26 RX ORDER — NEOSTIGMINE METHYLSULFATE 1 MG/ML
INJECTION, SOLUTION INTRAVENOUS
Status: DISCONTINUED | OUTPATIENT
Start: 2021-07-26 | End: 2021-07-26

## 2021-07-26 RX ORDER — ONDANSETRON 2 MG/ML
8 INJECTION INTRAMUSCULAR; INTRAVENOUS EVERY 8 HOURS PRN
Status: DISCONTINUED | OUTPATIENT
Start: 2021-07-26 | End: 2021-07-27 | Stop reason: HOSPADM

## 2021-07-26 RX ORDER — PROCHLORPERAZINE EDISYLATE 5 MG/ML
5 INJECTION INTRAMUSCULAR; INTRAVENOUS EVERY 30 MIN PRN
Status: DISCONTINUED | OUTPATIENT
Start: 2021-07-26 | End: 2021-07-26 | Stop reason: HOSPADM

## 2021-07-26 RX ORDER — KETOROLAC TROMETHAMINE 30 MG/ML
INJECTION, SOLUTION INTRAMUSCULAR; INTRAVENOUS
Status: DISCONTINUED | OUTPATIENT
Start: 2021-07-26 | End: 2021-07-26 | Stop reason: HOSPADM

## 2021-07-26 RX ORDER — OXYCODONE HYDROCHLORIDE 5 MG/1
5 TABLET ORAL EVERY 4 HOURS PRN
Status: DISCONTINUED | OUTPATIENT
Start: 2021-07-26 | End: 2021-07-27 | Stop reason: HOSPADM

## 2021-07-26 RX ORDER — ONDANSETRON 2 MG/ML
INJECTION INTRAMUSCULAR; INTRAVENOUS
Status: DISCONTINUED | OUTPATIENT
Start: 2021-07-26 | End: 2021-07-26

## 2021-07-26 RX ORDER — CEFAZOLIN SODIUM 2 G/50ML
2 SOLUTION INTRAVENOUS
Status: COMPLETED | OUTPATIENT
Start: 2021-07-26 | End: 2021-07-26

## 2021-07-26 RX ORDER — DOCUSATE SODIUM 100 MG/1
100 CAPSULE, LIQUID FILLED ORAL EVERY 12 HOURS
Status: DISCONTINUED | OUTPATIENT
Start: 2021-07-26 | End: 2021-07-27 | Stop reason: HOSPADM

## 2021-07-26 RX ORDER — BUPIVACAINE HYDROCHLORIDE AND EPINEPHRINE 2.5; 5 MG/ML; UG/ML
INJECTION, SOLUTION EPIDURAL; INFILTRATION; INTRACAUDAL; PERINEURAL
Status: DISCONTINUED | OUTPATIENT
Start: 2021-07-26 | End: 2021-07-26 | Stop reason: HOSPADM

## 2021-07-26 RX ADMIN — FENTANYL CITRATE 50 MCG: 50 INJECTION, SOLUTION INTRAMUSCULAR; INTRAVENOUS at 10:07

## 2021-07-26 RX ADMIN — HYDRALAZINE HYDROCHLORIDE 10 MG: 20 INJECTION INTRAMUSCULAR; INTRAVENOUS at 09:07

## 2021-07-26 RX ADMIN — ROCURONIUM BROMIDE 50 MG: 10 INJECTION, SOLUTION INTRAVENOUS at 09:07

## 2021-07-26 RX ADMIN — MEPERIDINE HYDROCHLORIDE 12.5 MG: 25 INJECTION INTRAMUSCULAR; INTRAVENOUS; SUBCUTANEOUS at 10:07

## 2021-07-26 RX ADMIN — GLYCOPYRROLATE 0.6 MG: 0.2 INJECTION, SOLUTION INTRAMUSCULAR; INTRAVITREAL at 10:07

## 2021-07-26 RX ADMIN — ACETAMINOPHEN 1000 MG: 500 TABLET, FILM COATED ORAL at 09:07

## 2021-07-26 RX ADMIN — OXYCODONE HYDROCHLORIDE 5 MG: 5 TABLET ORAL at 01:07

## 2021-07-26 RX ADMIN — NEOSTIGMINE METHYLSULFATE 5 MG: 1 INJECTION INTRAVENOUS at 10:07

## 2021-07-26 RX ADMIN — HYDROMORPHONE HYDROCHLORIDE 0.4 MG: 2 INJECTION INTRAMUSCULAR; INTRAVENOUS; SUBCUTANEOUS at 06:07

## 2021-07-26 RX ADMIN — HYDROMORPHONE HYDROCHLORIDE 0.4 MG: 2 INJECTION INTRAMUSCULAR; INTRAVENOUS; SUBCUTANEOUS at 04:07

## 2021-07-26 RX ADMIN — MIDAZOLAM HYDROCHLORIDE 2 MG: 1 INJECTION, SOLUTION INTRAMUSCULAR; INTRAVENOUS at 08:07

## 2021-07-26 RX ADMIN — PROPOFOL 200 MG: 10 INJECTION, EMULSION INTRAVENOUS at 09:07

## 2021-07-26 RX ADMIN — VANCOMYCIN HYDROCHLORIDE 1500 MG: 1.5 INJECTION, POWDER, LYOPHILIZED, FOR SOLUTION INTRAVENOUS at 10:07

## 2021-07-26 RX ADMIN — HYDROMORPHONE HYDROCHLORIDE 0.4 MG: 2 INJECTION INTRAMUSCULAR; INTRAVENOUS; SUBCUTANEOUS at 10:07

## 2021-07-26 RX ADMIN — OXYCODONE HYDROCHLORIDE 5 MG: 5 TABLET ORAL at 06:07

## 2021-07-26 RX ADMIN — TRANEXAMIC ACID 2000 MG: 100 INJECTION, SOLUTION INTRAVENOUS at 08:07

## 2021-07-26 RX ADMIN — CEFAZOLIN SODIUM 2 G: 2 SOLUTION INTRAVENOUS at 03:07

## 2021-07-26 RX ADMIN — SODIUM CHLORIDE, SODIUM LACTATE, POTASSIUM CHLORIDE, AND CALCIUM CHLORIDE: 600; 310; 30; 20 INJECTION, SOLUTION INTRAVENOUS at 08:07

## 2021-07-26 RX ADMIN — ESMOLOL HYDROCHLORIDE 10 MG: 10 INJECTION, SOLUTION INTRAVENOUS at 09:07

## 2021-07-26 RX ADMIN — ONDANSETRON HYDROCHLORIDE 4 MG: 2 INJECTION INTRAMUSCULAR; INTRAVENOUS at 09:07

## 2021-07-26 RX ADMIN — ACETAMINOPHEN 1000 MG: 500 TABLET, FILM COATED ORAL at 07:07

## 2021-07-26 RX ADMIN — OXYCODONE HYDROCHLORIDE 5 MG: 5 TABLET ORAL at 10:07

## 2021-07-26 RX ADMIN — SODIUM CHLORIDE: 0.9 INJECTION, SOLUTION INTRAVENOUS at 03:07

## 2021-07-26 RX ADMIN — HYDROMORPHONE HYDROCHLORIDE 0.4 MG: 2 INJECTION INTRAMUSCULAR; INTRAVENOUS; SUBCUTANEOUS at 07:07

## 2021-07-26 RX ADMIN — FENTANYL CITRATE 150 MCG: 50 INJECTION, SOLUTION INTRAMUSCULAR; INTRAVENOUS at 09:07

## 2021-07-26 RX ADMIN — DOCUSATE SODIUM 100 MG: 100 CAPSULE ORAL at 09:07

## 2021-07-26 RX ADMIN — OXYCODONE HYDROCHLORIDE 10 MG: 5 TABLET ORAL at 10:07

## 2021-07-26 RX ADMIN — FENTANYL CITRATE 50 MCG: 50 INJECTION, SOLUTION INTRAMUSCULAR; INTRAVENOUS at 09:07

## 2021-07-26 RX ADMIN — MUPIROCIN 1 G: 20 OINTMENT TOPICAL at 09:07

## 2021-07-26 RX ADMIN — CEFAZOLIN SODIUM 2 G: 2 SOLUTION INTRAVENOUS at 09:07

## 2021-07-26 RX ADMIN — LIDOCAINE HYDROCHLORIDE 50 MG: 20 INJECTION, SOLUTION INTRAVENOUS at 09:07

## 2021-07-26 RX ADMIN — VANCOMYCIN HYDROCHLORIDE 1500 MG: 1.5 INJECTION, POWDER, LYOPHILIZED, FOR SOLUTION INTRAVENOUS at 07:07

## 2021-07-27 VITALS
WEIGHT: 231.5 LBS | TEMPERATURE: 99 F | HEIGHT: 74 IN | HEART RATE: 91 BPM | SYSTOLIC BLOOD PRESSURE: 139 MMHG | OXYGEN SATURATION: 98 % | DIASTOLIC BLOOD PRESSURE: 87 MMHG | RESPIRATION RATE: 18 BRPM | BODY MASS INDEX: 29.71 KG/M2

## 2021-07-27 PROBLEM — M17.12 OSTEOARTHRITIS OF LEFT KNEE: Status: RESOLVED | Noted: 2021-07-26 | Resolved: 2021-07-27

## 2021-07-27 LAB
ERYTHROCYTE [DISTWIDTH] IN BLOOD BY AUTOMATED COUNT: 14.7 % (ref 11.5–14.5)
HCT VFR BLD AUTO: 43 % (ref 40–54)
HGB BLD-MCNC: 13.9 G/DL (ref 14–18)
MCH RBC QN AUTO: 26.7 PG (ref 27–31)
MCHC RBC AUTO-ENTMCNC: 32.3 G/DL (ref 32–36)
MCV RBC AUTO: 83 FL (ref 82–98)
PLATELET # BLD AUTO: 169 K/UL (ref 150–450)
PMV BLD AUTO: 10.5 FL (ref 9.2–12.9)
POCT GLUCOSE: 108 MG/DL (ref 70–110)
POCT GLUCOSE: 122 MG/DL (ref 70–110)
RBC # BLD AUTO: 5.21 M/UL (ref 4.6–6.2)
URATE SERPL-MCNC: 7.3 MG/DL (ref 3.4–7)
WBC # BLD AUTO: 10.6 K/UL (ref 3.9–12.7)

## 2021-07-27 PROCEDURE — 36415 COLL VENOUS BLD VENIPUNCTURE: CPT | Performed by: NURSE PRACTITIONER

## 2021-07-27 PROCEDURE — 97110 THERAPEUTIC EXERCISES: CPT | Mod: CQ

## 2021-07-27 PROCEDURE — 85027 COMPLETE CBC AUTOMATED: CPT | Performed by: ORTHOPAEDIC SURGERY

## 2021-07-27 PROCEDURE — 25000003 PHARM REV CODE 250: Performed by: ORTHOPAEDIC SURGERY

## 2021-07-27 PROCEDURE — 97530 THERAPEUTIC ACTIVITIES: CPT | Mod: CQ

## 2021-07-27 PROCEDURE — 97116 GAIT TRAINING THERAPY: CPT | Mod: CQ

## 2021-07-27 PROCEDURE — 97165 OT EVAL LOW COMPLEX 30 MIN: CPT

## 2021-07-27 PROCEDURE — 97530 THERAPEUTIC ACTIVITIES: CPT

## 2021-07-27 PROCEDURE — 97535 SELF CARE MNGMENT TRAINING: CPT

## 2021-07-27 PROCEDURE — 84550 ASSAY OF BLOOD/URIC ACID: CPT | Performed by: NURSE PRACTITIONER

## 2021-07-27 PROCEDURE — 25000003 PHARM REV CODE 250: Performed by: NURSE PRACTITIONER

## 2021-07-27 RX ORDER — ONDANSETRON 8 MG/1
8 TABLET, ORALLY DISINTEGRATING ORAL EVERY 8 HOURS PRN
Qty: 20 TABLET | Refills: 1 | Status: SHIPPED | OUTPATIENT
Start: 2021-07-27 | End: 2022-07-07

## 2021-07-27 RX ORDER — OXYCODONE AND ACETAMINOPHEN 5; 325 MG/1; MG/1
TABLET ORAL
Qty: 60 TABLET | Refills: 0 | Status: SHIPPED | OUTPATIENT
Start: 2021-07-27 | End: 2022-07-07

## 2021-07-27 RX ORDER — FAMOTIDINE 20 MG/1
20 TABLET, FILM COATED ORAL 2 TIMES DAILY
Status: DISCONTINUED | OUTPATIENT
Start: 2021-07-27 | End: 2021-07-27 | Stop reason: HOSPADM

## 2021-07-27 RX ORDER — NAPROXEN SODIUM 220 MG/1
81 TABLET, FILM COATED ORAL 2 TIMES DAILY
Qty: 60 TABLET | Refills: 0 | Status: SHIPPED | OUTPATIENT
Start: 2021-07-27 | End: 2023-02-16

## 2021-07-27 RX ADMIN — ASPIRIN 81 MG CHEWABLE TABLET 81 MG: 81 TABLET CHEWABLE at 09:07

## 2021-07-27 RX ADMIN — OXYCODONE HYDROCHLORIDE 10 MG: 5 TABLET ORAL at 09:07

## 2021-07-27 RX ADMIN — ATORVASTATIN CALCIUM 20 MG: 20 TABLET, FILM COATED ORAL at 09:07

## 2021-07-27 RX ADMIN — CELECOXIB 200 MG: 200 CAPSULE ORAL at 09:07

## 2021-07-27 RX ADMIN — DOCUSATE SODIUM 100 MG: 100 CAPSULE ORAL at 09:07

## 2021-07-27 RX ADMIN — OXYCODONE HYDROCHLORIDE 10 MG: 5 TABLET ORAL at 05:07

## 2021-07-27 RX ADMIN — ACETAMINOPHEN 1000 MG: 500 TABLET, FILM COATED ORAL at 05:07

## 2021-07-27 RX ADMIN — MUPIROCIN 1 G: 20 OINTMENT TOPICAL at 10:07

## 2021-07-27 RX ADMIN — FAMOTIDINE 20 MG: 20 TABLET ORAL at 09:07

## 2021-08-09 ENCOUNTER — EXTERNAL HOME HEALTH (OUTPATIENT)
Dept: HOME HEALTH SERVICES | Facility: HOSPITAL | Age: 61
End: 2021-08-09

## 2021-08-20 ENCOUNTER — DOCUMENT SCAN (OUTPATIENT)
Dept: HOME HEALTH SERVICES | Facility: HOSPITAL | Age: 61
End: 2021-08-20

## 2021-12-01 ENCOUNTER — TELEPHONE (OUTPATIENT)
Dept: NEUROSURGERY | Facility: CLINIC | Age: 61
End: 2021-12-01
Payer: MEDICAID

## 2021-12-01 DIAGNOSIS — R52 PAIN: Primary | ICD-10-CM

## 2022-01-24 ENCOUNTER — TELEPHONE (OUTPATIENT)
Dept: DERMATOLOGY | Facility: CLINIC | Age: 62
End: 2022-01-24
Payer: MEDICAID

## 2022-01-24 NOTE — TELEPHONE ENCOUNTER
Spoke with pt, inform pt that [facility] next available apt was 4/13 pt stated that he got bumps on lets that came back since last apt with [facility] which was 2/2021. Pt asked if [facility] had virtual . Next virtual was 2/16 , pt feel like he need to be seen soon. Offered derm res clinic apt for 1/31 at 3 pm . Pt confirm date and time    ----- Message from [place] sent at 1/24/2022  9:14 AM CST -----  Patient called to speak w/ someone in regards to scheduling a f/u appt for an skin check(bumps) callback [phone]

## 2022-01-31 ENCOUNTER — OFFICE VISIT (OUTPATIENT)
Dept: DERMATOLOGY | Facility: CLINIC | Age: 62
End: 2022-01-31
Payer: MEDICARE

## 2022-01-31 DIAGNOSIS — L30.9 DERMATITIS: ICD-10-CM

## 2022-01-31 DIAGNOSIS — L29.9 PRURITUS: Primary | ICD-10-CM

## 2022-01-31 PROCEDURE — 88312 PR  SPECIAL STAINS,GROUP I: ICD-10-PCS | Mod: 26,,, | Performed by: DERMATOLOGY

## 2022-01-31 PROCEDURE — 88312 SPECIAL STAINS GROUP 1: CPT | Performed by: DERMATOLOGY

## 2022-01-31 PROCEDURE — 99214 PR OFFICE/OUTPT VISIT, EST, LEVL IV, 30-39 MIN: ICD-10-PCS | Mod: 25,S$GLB,, | Performed by: DERMATOLOGY

## 2022-01-31 PROCEDURE — 99213 OFFICE O/P EST LOW 20 MIN: CPT | Mod: PBBFAC | Performed by: DERMATOLOGY

## 2022-01-31 PROCEDURE — 88305 TISSUE EXAM BY PATHOLOGIST: ICD-10-PCS | Mod: 26,,, | Performed by: DERMATOLOGY

## 2022-01-31 PROCEDURE — 88312 SPECIAL STAINS GROUP 1: CPT | Mod: 26,,, | Performed by: DERMATOLOGY

## 2022-01-31 PROCEDURE — 99214 OFFICE O/P EST MOD 30 MIN: CPT | Mod: 25,S$GLB,, | Performed by: DERMATOLOGY

## 2022-01-31 PROCEDURE — 11104 PR PUNCH BIOPSY, SKIN, SINGLE LESION: ICD-10-PCS | Mod: GC,S$GLB,, | Performed by: STUDENT IN AN ORGANIZED HEALTH CARE EDUCATION/TRAINING PROGRAM

## 2022-01-31 PROCEDURE — 99999 PR PBB SHADOW E&M-EST. PATIENT-LVL III: ICD-10-PCS | Mod: PBBFAC,,, | Performed by: DERMATOLOGY

## 2022-01-31 PROCEDURE — 88305 TISSUE EXAM BY PATHOLOGIST: CPT | Performed by: DERMATOLOGY

## 2022-01-31 PROCEDURE — 99999 PR PBB SHADOW E&M-EST. PATIENT-LVL III: CPT | Mod: PBBFAC,,, | Performed by: DERMATOLOGY

## 2022-01-31 PROCEDURE — 11104 PUNCH BX SKIN SINGLE LESION: CPT | Mod: PBBFAC | Performed by: STUDENT IN AN ORGANIZED HEALTH CARE EDUCATION/TRAINING PROGRAM

## 2022-01-31 PROCEDURE — 11104 PUNCH BX SKIN SINGLE LESION: CPT | Mod: GC,S$GLB,, | Performed by: STUDENT IN AN ORGANIZED HEALTH CARE EDUCATION/TRAINING PROGRAM

## 2022-01-31 PROCEDURE — 88305 TISSUE EXAM BY PATHOLOGIST: CPT | Mod: 26,,, | Performed by: DERMATOLOGY

## 2022-01-31 RX ORDER — HYDROXYZINE HYDROCHLORIDE 25 MG/1
25 TABLET, FILM COATED ORAL NIGHTLY PRN
Qty: 30 TABLET | Refills: 0 | Status: SHIPPED | OUTPATIENT
Start: 2022-01-31 | End: 2022-07-07

## 2022-01-31 RX ORDER — TRIAMCINOLONE ACETONIDE 1 MG/G
OINTMENT TOPICAL 2 TIMES DAILY
Qty: 454 G | Refills: 3 | Status: SHIPPED | OUTPATIENT
Start: 2022-01-31 | End: 2023-02-09

## 2022-01-31 NOTE — PROGRESS NOTES
Subjective:       Patient ID:  Ezekiel Noyola is a 61 y.o. male who presents for   Chief Complaint   Patient presents with    Rash    Itching     Here for evaluation of 1 week of rash and pruritus. Rash began on left ankle and spread to involve bilateral upper and lower extremities, trunk, neck, and head. He has applied vaseline and says that seems to be helping with rash fading in some areas. Denies any rash to feet or groin. Not taking antihistamines. Only recent change was the addition of terazosin in early December 2021 by pcp (outside facility). He stopped terazosin 2 days ago as he is concerned it is causing the rash.    Saw Dr. Denis almost a year ago for a pruritic rash to thighs/groin. Biopsy at that time showed spongiotic dermatitis with eosinophils. It quickly resolved after stopping another prescribed medicine by pcp (unsure whether statin or bp medication).       Review of Systems   Constitutional: Negative.    Skin: Positive for itching and rash.   All other systems reviewed and are negative.       Objective:    Physical Exam   Constitutional: He appears well-developed and well-nourished.   Neurological: He is alert and oriented to person, place, and time.   Psychiatric: He has a normal mood and affect.   Skin:   Areas Examined (abnormalities noted in diagram):   Head / Face Inspection Performed  Neck Inspection Performed  Chest / Axilla Inspection Performed  Abdomen Inspection Performed  Back Inspection Performed  RUE Inspected  LUE Inspection Performed  RLE Inspected  LLE Inspection Performed  Nails and Digits Inspection Performed              Diagram Legend     Erythematous scaling macule/papule c/w actinic keratosis       Vascular papule c/w angioma      Pigmented verrucoid papule/plaque c/w seborrheic keratosis      Yellow umbilicated papule c/w sebaceous hyperplasia      Irregularly shaped tan macule c/w lentigo     1-2 mm smooth white papules consistent with Milia      Movable subcutaneous  cyst with punctum c/w epidermal inclusion cyst      Subcutaneous movable cyst c/w pilar cyst      Firm pink to brown papule c/w dermatofibroma      Pedunculated fleshy papule(s) c/w skin tag(s)      Evenly pigmented macule c/w junctional nevus     Mildly variegated pigmented, slightly irregular-bordered macule c/w mildly atypical nevus      Flesh colored to evenly pigmented papule c/w intradermal nevus       Pink pearly papule/plaque c/w basal cell carcinoma      Erythematous hyperkeratotic cursted plaque c/w SCC      Surgical scar with no sign of skin cancer recurrence      Open and closed comedones      Inflammatory papules and pustules      Verrucoid papule consistent consistent with wart     Erythematous eczematous patches and plaques     Dystrophic onycholytic nail with subungual debris c/w onychomycosis     Umbilicated papule    Erythematous-base heme-crusted tan verrucoid plaque consistent with inflamed seborrheic keratosis     Erythematous Silvery Scaling Plaque c/w Psoriasis     See annotation                      Assessment / Plan:      Pathology Orders:     Normal Orders This Visit    Specimen to Pathology, Dermatology     Comments:    Number of Specimens:->1  ------------------------->-------------------------  Spec 1 Procedure:->Biopsy  Spec 1 Clinical Impression:->allergic drug vs viral exanthem  vs ID vs papular urticaria  Spec 1 Source:->left abdomen  Release to patient->Immediate    Questions:    Procedure Type: Dermatology and skin neoplasms    Number of Specimens: 1    ------------------------: -------------------------    Spec 1 Procedure: Biopsy    Spec 1 Clinical Impression: allergic drug vs viral exanthem vs ID vs papular urticaria    Spec 1 Source: left abdomen    Clinical Information: 62 y/o M with 1 week history of pruritic monomorphic erythematous papules to extremities and trunk. history of allergic eczematous dermatitis. no recent illnesses or contacts. did start terazosin 6 weeks prior  to onset    Release to patient: Immediate        Dermatitis  Patient with extensive pruritic monomorphic erythematous rash. ddx includes morbilliform drug vs viral exanthem vs ID.  - start TAC 0.1% ointment bid to affected areas. Avoid on face, groin, axillae  - start hydroxyzine 25 mg nightly prn  - will call patient with biopsy results and adjust therapy accordingly  - agree with patient's plan to stop terazosin for now and discuss with his primary care at follow up later this week    Punch biopsy procedure note:  Punch biopsy performed after verbal consent obtained. Area marked and prepped with alcohol. Approximately 1cc of 1% lidocaine with epinephrine injected. 4 mm disposable punch used to remove lesion. Hemostasis obtained and biopsy site closed with Gelfoam. Wound care instructions reviewed with patient and handout given.    -     triamcinolone acetonide 0.1% (KENALOG) 0.1 % ointment; Apply topically 2 (two) times daily.  Dispense: 454 g; Refill: 3  -     hydrOXYzine HCL (ATARAX) 25 MG tablet; Take 1 tablet (25 mg total) by mouth nightly as needed for Itching.  Dispense: 30 tablet; Refill: 0  -     Specimen to Pathology, Dermatology      Pruritus  -     triamcinolone acetonide 0.1% (KENALOG) 0.1 % ointment; Apply topically 2 (two) times daily.  Dispense: 454 g; Refill: 3  -     hydrOXYzine HCL (ATARAX) 25 MG tablet; Take 1 tablet (25 mg total) by mouth nightly as needed for Itching.  Dispense: 30 tablet; Refill: 0

## 2022-02-07 LAB
FINAL PATHOLOGIC DIAGNOSIS: NORMAL
GROSS: NORMAL
Lab: NORMAL
MICROSCOPIC EXAM: NORMAL

## 2022-07-07 ENCOUNTER — OFFICE VISIT (OUTPATIENT)
Dept: CARDIOLOGY | Facility: CLINIC | Age: 62
End: 2022-07-07
Payer: MEDICARE

## 2022-07-07 VITALS
HEIGHT: 74 IN | SYSTOLIC BLOOD PRESSURE: 144 MMHG | WEIGHT: 227.19 LBS | DIASTOLIC BLOOD PRESSURE: 98 MMHG | OXYGEN SATURATION: 98 % | BODY MASS INDEX: 29.16 KG/M2 | HEART RATE: 76 BPM

## 2022-07-07 DIAGNOSIS — I10 ESSENTIAL HYPERTENSION: Primary | ICD-10-CM

## 2022-07-07 DIAGNOSIS — I11.0 HYPERTENSIVE HEART DISEASE WITH ACUTE ON CHRONIC DIASTOLIC CONGESTIVE HEART FAILURE: ICD-10-CM

## 2022-07-07 DIAGNOSIS — E11.40 TYPE 2 DIABETES MELLITUS WITH DIABETIC NEUROPATHY, WITHOUT LONG-TERM CURRENT USE OF INSULIN: ICD-10-CM

## 2022-07-07 DIAGNOSIS — I50.33 HYPERTENSIVE HEART DISEASE WITH ACUTE ON CHRONIC DIASTOLIC CONGESTIVE HEART FAILURE: ICD-10-CM

## 2022-07-07 PROCEDURE — 99999 PR PBB SHADOW E&M-EST. PATIENT-LVL III: ICD-10-PCS | Mod: PBBFAC,,, | Performed by: INTERNAL MEDICINE

## 2022-07-07 PROCEDURE — 1160F RVW MEDS BY RX/DR IN RCRD: CPT | Mod: CPTII,S$GLB,, | Performed by: INTERNAL MEDICINE

## 2022-07-07 PROCEDURE — 99214 OFFICE O/P EST MOD 30 MIN: CPT | Mod: S$GLB,,, | Performed by: INTERNAL MEDICINE

## 2022-07-07 PROCEDURE — 3008F PR BODY MASS INDEX (BMI) DOCUMENTED: ICD-10-PCS | Mod: CPTII,S$GLB,, | Performed by: INTERNAL MEDICINE

## 2022-07-07 PROCEDURE — 99213 OFFICE O/P EST LOW 20 MIN: CPT | Mod: PBBFAC | Performed by: INTERNAL MEDICINE

## 2022-07-07 PROCEDURE — 3077F PR MOST RECENT SYSTOLIC BLOOD PRESSURE >= 140 MM HG: ICD-10-PCS | Mod: CPTII,S$GLB,, | Performed by: INTERNAL MEDICINE

## 2022-07-07 PROCEDURE — 99214 PR OFFICE/OUTPT VISIT, EST, LEVL IV, 30-39 MIN: ICD-10-PCS | Mod: S$GLB,,, | Performed by: INTERNAL MEDICINE

## 2022-07-07 PROCEDURE — 3008F BODY MASS INDEX DOCD: CPT | Mod: CPTII,S$GLB,, | Performed by: INTERNAL MEDICINE

## 2022-07-07 PROCEDURE — 99999 PR PBB SHADOW E&M-EST. PATIENT-LVL III: CPT | Mod: PBBFAC,,, | Performed by: INTERNAL MEDICINE

## 2022-07-07 PROCEDURE — 3080F PR MOST RECENT DIASTOLIC BLOOD PRESSURE >= 90 MM HG: ICD-10-PCS | Mod: CPTII,S$GLB,, | Performed by: INTERNAL MEDICINE

## 2022-07-07 PROCEDURE — 3080F DIAST BP >= 90 MM HG: CPT | Mod: CPTII,S$GLB,, | Performed by: INTERNAL MEDICINE

## 2022-07-07 PROCEDURE — 3077F SYST BP >= 140 MM HG: CPT | Mod: CPTII,S$GLB,, | Performed by: INTERNAL MEDICINE

## 2022-07-07 PROCEDURE — 1159F MED LIST DOCD IN RCRD: CPT | Mod: CPTII,S$GLB,, | Performed by: INTERNAL MEDICINE

## 2022-07-07 PROCEDURE — 1160F PR REVIEW ALL MEDS BY PRESCRIBER/CLIN PHARMACIST DOCUMENTED: ICD-10-PCS | Mod: CPTII,S$GLB,, | Performed by: INTERNAL MEDICINE

## 2022-07-07 PROCEDURE — 1159F PR MEDICATION LIST DOCUMENTED IN MEDICAL RECORD: ICD-10-PCS | Mod: CPTII,S$GLB,, | Performed by: INTERNAL MEDICINE

## 2022-07-07 RX ORDER — HYDROCHLOROTHIAZIDE 25 MG/1
25 TABLET ORAL DAILY
Qty: 30 TABLET | Refills: 11 | Status: SHIPPED | OUTPATIENT
Start: 2022-07-07 | End: 2023-02-09

## 2022-07-07 RX ORDER — EZETIMIBE 10 MG/1
10 TABLET ORAL DAILY
COMMUNITY
Start: 2022-05-07

## 2022-07-07 RX ORDER — HYDROCODONE BITARTRATE AND ACETAMINOPHEN 10; 325 MG/1; MG/1
1 TABLET ORAL
COMMUNITY
Start: 2022-06-09

## 2022-07-07 RX ORDER — METFORMIN HYDROCHLORIDE 500 MG/1
500 TABLET ORAL 2 TIMES DAILY
COMMUNITY
Start: 2022-04-23 | End: 2023-02-09

## 2022-07-07 NOTE — PROGRESS NOTES
[facility]    Chief Complaint  Chief Complaint   Patient presents with    Shortness of Breath       HPI:    For over a month, the patient has been waking up at night short of breath having to urinate.  When he gets back from his trip to the bathroom, he sits on the side of the bed for a while short of breath before going back to sleep.  It has been getting worse recently.  No associated chest tightness.  No dyspnea when he is up and around moving during the day.  No edema.    Medications  Current Outpatient Medications   Medication Sig Dispense Refill    aspirin 81 MG Chew Take 1 tablet (81 mg total) by mouth 2 (two) times daily. (Patient taking differently: Take 81 mg by mouth once daily.) 60 tablet 0    carvediloL (COREG) 6.25 MG tablet Take 6.25 mg by mouth 2 (two) times daily with meals.      ezetimibe (ZETIA) 10 mg tablet Take 10 mg by mouth once daily.      meloxicam (MOBIC) 15 MG tablet Take 1 tablet (15 mg total) by mouth once daily. 10 tablet 0    BD ALCOHOL SWABS PadM       hydroCHLOROthiazide (HYDRODIURIL) 25 MG tablet Take 1 tablet (25 mg total) by mouth once daily. 30 tablet 11    HYDROcodone-acetaminophen (NORCO)  mg per tablet Take 1 tablet by mouth after meals as needed.      metFORMIN (GLUCOPHAGE) 500 MG tablet Take 500 mg by mouth 2 (two) times daily.      triamcinolone acetonide 0.1% (KENALOG) 0.1 % ointment Apply topically 2 (two) times daily. 454 g 3    TRUE METRIX GLUCOSE TEST STRIP Strp       TRUEPLUS LANCETS 33 gauge Misc        No current facility-administered medications for this visit.        History  Past Medical History:   Diagnosis Date    Diabetes mellitus     Gout     Hepatitis C     Hypercholesteremia     Hypertension      Past Surgical History:   Procedure Laterality Date    ARTHROPLASTY, KNEE, TOTAL, SIGHT ASSISTED Left 7/26/2021    Procedure: ARTHROPLASTY, KNEE, TOTAL, SIGHT ASSISTED;  Surgeon: Jan Rust MD;  Location: [facility];   Service: Orthopedics;  Laterality: Left;    BACK SURGERY      CORONARY ANGIOGRAPHY  08/24/2016    no CAD    FRACTURE SURGERY Left     leg    FUSION OF SPINE WITH INSTRUMENTATION N/A 12/20/2018    Procedure: FUSION, SPINE, WITH INSTRUMENTATION Removal of spinal hardware, Bilateral Jamison-White Osteotomy L5-S1, L5-S1 Interbody Arthrodesis, L4-S1 Segmental Instrumentation with Posterior Lateral Arthrodesis;  Surgeon: Ezequiel Pedroza MD;  Location: [facility];  Service: Neurosurgery;  Laterality: N/A;  Procedure: Removal of spinal hardware, Revision of Posterolateral fusion from L3-S1, L5-S1 In    HIP SURGERY Right     hip fusion    INJECTION OF STEROID Right 3/22/2019    Procedure: INJECTION, STEROID Right SI Joint Block and Steroid Injection;  Surgeon: Ezequiel Pedroza MD;  Location: [facility] OR;  Service: Neurosurgery;  Laterality: Right;  Procedure: Right SI Joint Block and Steroid Injection  Surgery Time: 15 Min  LOS:0  Anesthesia: General  Radiology: C-Arm  Bed: Regular Bed  Position: Prone    KNEE ARTHROSCOPY      LAMINECTOMY      Lumbar    MYELOGRAPHY N/A 11/5/2018    Procedure: Myelogram;  Surgeon: Mel Diagnostic Provider;  Location: [address];  Service: Radiology;  Laterality: N/A;    right wrist surgery      TRIAL OF SPINAL CORD NERVE STIMULATOR N/A 9/20/2019    Procedure: Trial, Neurostimulator, Spinal Cord Spinal Cord Stimulator Trial using 2 Percutaneous Least at T9-10;  Surgeon: Ezequiel Pedroza MD;  Location: [address];  Service: Neurosurgery;  Laterality: N/A;  Procedure: Spinal Cord Stimulator Trial using 2 Percutaneous Least at T9-10  Surgery Time: 1 Hr  LOS: 0  Anesthesia: General  Radiology: C-arm  Bed: [place] 4 Poster  Position: Prone  Eq     Social History     Socioeconomic History    Marital status:    Tobacco Use    Smoking status: Former Smoker    Smokeless tobacco: Never Used    Tobacco comment: quit over 30yrs   Substance and Sexual Activity    Alcohol use:  "No    Drug use: Yes     Types: Marijuana     Comment: daily     Family History   Problem Relation Age of Onset    Diabetes Sister     Hypertension Sister     Diabetes Brother     Hypertension Brother     Melanoma Neg Hx     Psoriasis Neg Hx     Lupus Neg Hx         Allergies  Review of patient's allergies indicates:   Allergen Reactions    Lactose     Gabapentin Other (See Comments)     Patient states, "it makes me bleed from my rectum. Don't give it to me."    Atorvastatin Rash     Dye used    Penicillins Rash     Pt states a "a couple of years ago" he took penicillin and broke out in "red spots" with no itching or difficulty breathing       Review of Systems   Review of Systems   Constitutional: Negative for malaise/fatigue, weight gain and weight loss.   Eyes: Negative for visual disturbance.   Cardiovascular: Positive for dyspnea on exertion and paroxysmal nocturnal dyspnea. Negative for chest pain, claudication, cyanosis, irregular heartbeat, leg swelling, near-syncope, orthopnea, palpitations and syncope.   Respiratory: Positive for shortness of breath. Negative for cough, hemoptysis, sleep disturbances due to breathing and wheezing.    Hematologic/Lymphatic: Negative for bleeding problem. Does not bruise/bleed easily.   Skin: Negative for poor wound healing.   Musculoskeletal: Positive for back pain and joint pain. Negative for muscle cramps and myalgias.   Gastrointestinal: Negative for abdominal pain, anorexia, diarrhea, heartburn, hematemesis, hematochezia, melena, nausea and vomiting.   Genitourinary: Positive for frequency. Negative for hematuria and nocturia.   Neurological: Negative for excessive daytime sleepiness, dizziness, focal weakness, light-headedness and weakness.       Physical Exam  Vitals:    07/07/22 1435   BP: (!) 144/98   Pulse: 76     Wt Readings from Last 1 Encounters:   07/07/22 103.1 kg (227 lb 3.2 oz)     Physical Exam  Constitutional:       General: He is not in acute " distress.     Appearance: He is not toxic-appearing or diaphoretic.   HENT:      Head: Normocephalic and atraumatic.   Eyes:      General: No scleral icterus.     Conjunctiva/sclera: Conjunctivae normal.   Neck:      Thyroid: No thyromegaly.      Vascular: No carotid bruit, hepatojugular reflux or JVD.      Trachea: No tracheal deviation.   Cardiovascular:      Rate and Rhythm: Normal rate and regular rhythm.  No extrasystoles are present.     Chest Wall: PMI is not displaced.      Pulses:           Carotid pulses are 2+ on the right side and 2+ on the left side.       Radial pulses are 2+ on the right side and 2+ on the left side.        Dorsalis pedis pulses are 2+ on the right side and 2+ on the left side.        Posterior tibial pulses are 2+ on the right side and 2+ on the left side.      Heart sounds: S1 normal and S2 normal. No murmur heard.    No S3 or S4 sounds.   Pulmonary:      Effort: No accessory muscle usage or respiratory distress.      Breath sounds: No decreased breath sounds, wheezing, rhonchi or rales.   Abdominal:      General: Bowel sounds are normal.      Palpations: Abdomen is soft.      Tenderness: There is no abdominal tenderness.   Musculoskeletal:         General: No tenderness or deformity.      Cervical back: Neck supple.      Right lower leg: No edema.      Left lower leg: No edema.   Skin:     General: Skin is warm and dry.      Coloration: Skin is not pale.      Nails: There is no clubbing.   Neurological:      Mental Status: He is alert and oriented to person, place, and time.      Cranial Nerves: No cranial nerve deficit.      Sensory: No sensory deficit.   Psychiatric:         Speech: Speech normal.         Behavior: Behavior normal. Behavior is cooperative.         Labs  Office Visit on 01/31/2022   Component Date Value Ref Range Status    Final Pathologic Diagnosis 01/31/2022    Final                    Value:Skin, left abdomen, punch biopsy:  -SUPERFICIAL PERIVASCULAR  "DERMATITIS, see comment  Comment:  Clinical images in history reviewed. The changes found here in the  biopsy are consistent with a morbilliform drug reaction versus other viral  exanthem.      Interp By Caitlin Berg M.D., Signed on 02/07/2022 at 16:32    Gross 01/31/2022    Final                    Value:Pathology ID/Patient ID:  [id number]  The specimen is received in formalin labeled "left abdomen".  The specimen  consists of one punch biopsy of tan-yellow skin measuring 0.4 x 0.4 cm and  excised to a depth of 0.6 cm .  The specimen is bisected,  inked blue at the  resection margin and submitted entirely in cassette  XEG--1-A  Lexii Mcdaniel      Microscopic Exam 01/31/2022    Final                    Value:Sections show skin with mild superficial perivascuclar and perifollicular  infiltrate of lymphocytes and histiocytes, accompanied by vascular dilatation  and dermal edema.  The overlying epidermis shows mild basal vacuolar change,  mild spongiosis, and rare scattered dyskeratotic keratinocytes.  No  microorganisms are appreciated on PAS fungal stain with adequate positive  control.      Disclaimer 01/31/2022    Final                    Value:Unless the case is a 'gross only' or additional testing only, the final  diagnosis for each specimen is based on a microscopic examination of  appropriate tissue sections.         Imaging  X-Ray Wrist Complete Right    Result Date: 7/2/2022  EXAMINATION: XR WRIST COMPLETE 3 VIEWS RIGHT CLINICAL HISTORY: Unspecified injury of unspecified wrist, hand and finger(s), initial encounter TECHNIQUE: PA, lateral, and oblique views of the right wrist were performed. COMPARISON: None FINDINGS: No definite evidence of acute fracture or dislocation.  Joint spaces maintained.  Degenerative change at the thumb CMC and MCP joint suggested.  No definite radiopaque foreign body identified.     No convincing evidence of acute fracture or dislocation. Electronically signed " by: Henok Holland Date:    07/02/2022 Time:    08:04      Assessment  1. Essential hypertension  Needs better control  - Basic Metabolic Panel; Future  - Magnesium; Future    2. Hypertensive heart disease with acute on chronic diastolic congestive heart failure  I suspect this is etiology for his exertional dyspnea    3. Type 2 diabetes mellitus with diabetic neuropathy, without long-term current use of insulin  Dr. Avila      Plan and Discussion    Sounds like his dyspnea is related to volume overload.  Will start a thiazide diuretic.  Will likely help with his blood pressure is well.  Check labs 1-2 weeks after starting.  Follow-up in 1 month.  Discussed sodium restriction.    The ASCVD Risk score (Ricco DC Jr., et al., 2013) failed to calculate for the following reasons:    Cannot find a previous HDL lab    Cannot find a previous total cholesterol lab     Follow Up  Follow up in about 4 weeks (around 8/4/2022).      Elan Smith MD

## 2022-07-13 ENCOUNTER — TELEPHONE (OUTPATIENT)
Dept: NEUROSURGERY | Facility: CLINIC | Age: 62
End: 2022-07-13
Payer: MEDICARE

## 2022-07-13 NOTE — TELEPHONE ENCOUNTER
----- Message from Marta Chappell sent at 7/13/2022  3:31 PM CDT -----  Type:  Appointment Request      Name of Caller: Pt   When is the first available appointment?   Symptoms: back pain   Best Call Back Number [phone]  Additional Information:  pt fell about 2 wks ago and was advised to go back to Dr Pedroza to make sure nothing has come lose... Dr Pedroza did his back fusion

## 2022-07-21 ENCOUNTER — LAB VISIT (OUTPATIENT)
Dept: LAB | Facility: OTHER | Age: 62
End: 2022-07-21
Attending: INTERNAL MEDICINE
Payer: MEDICARE

## 2022-07-21 DIAGNOSIS — I10 ESSENTIAL HYPERTENSION: ICD-10-CM

## 2022-07-21 LAB
ANION GAP SERPL CALC-SCNC: 12 MMOL/L (ref 8–16)
BUN SERPL-MCNC: 16 MG/DL (ref 8–23)
CALCIUM SERPL-MCNC: 10 MG/DL (ref 8.7–10.5)
CHLORIDE SERPL-SCNC: 100 MMOL/L (ref 95–110)
CO2 SERPL-SCNC: 26 MMOL/L (ref 23–29)
CREAT SERPL-MCNC: 1.2 MG/DL (ref 0.5–1.4)
EST. GFR  (AFRICAN AMERICAN): >60 ML/MIN/1.73 M^2
EST. GFR  (NON AFRICAN AMERICAN): >60 ML/MIN/1.73 M^2
GLUCOSE SERPL-MCNC: 238 MG/DL (ref 70–110)
MAGNESIUM SERPL-MCNC: 1.9 MG/DL (ref 1.6–2.6)
POTASSIUM SERPL-SCNC: 4.3 MMOL/L (ref 3.5–5.1)
SODIUM SERPL-SCNC: 138 MMOL/L (ref 136–145)

## 2022-07-21 PROCEDURE — 36415 COLL VENOUS BLD VENIPUNCTURE: CPT | Performed by: INTERNAL MEDICINE

## 2022-07-21 PROCEDURE — 83735 ASSAY OF MAGNESIUM: CPT | Performed by: INTERNAL MEDICINE

## 2022-07-21 PROCEDURE — 80048 BASIC METABOLIC PNL TOTAL CA: CPT | Performed by: INTERNAL MEDICINE

## 2022-08-12 ENCOUNTER — HOSPITAL ENCOUNTER (OUTPATIENT)
Dept: RADIOLOGY | Facility: OTHER | Age: 62
Discharge: HOME | End: 2022-08-12
Attending: INTERNAL MEDICINE
Payer: MEDICARE

## 2022-08-12 ENCOUNTER — OFFICE VISIT (OUTPATIENT)
Dept: CARDIOLOGY | Facility: CLINIC | Age: 62
End: 2022-08-12
Payer: MEDICARE

## 2022-08-12 VITALS
WEIGHT: 226.88 LBS | SYSTOLIC BLOOD PRESSURE: 126 MMHG | DIASTOLIC BLOOD PRESSURE: 84 MMHG | BODY MASS INDEX: 29.13 KG/M2 | HEART RATE: 80 BPM

## 2022-08-12 DIAGNOSIS — I10 ESSENTIAL HYPERTENSION: Primary | ICD-10-CM

## 2022-08-12 DIAGNOSIS — I11.0 HYPERTENSIVE HEART DISEASE WITH ACUTE ON CHRONIC DIASTOLIC CONGESTIVE HEART FAILURE: ICD-10-CM

## 2022-08-12 DIAGNOSIS — I50.33 HYPERTENSIVE HEART DISEASE WITH ACUTE ON CHRONIC DIASTOLIC CONGESTIVE HEART FAILURE: ICD-10-CM

## 2022-08-12 DIAGNOSIS — R06.09 OTHER FORMS OF DYSPNEA: ICD-10-CM

## 2022-08-12 PROCEDURE — 3074F PR MOST RECENT SYSTOLIC BLOOD PRESSURE < 130 MM HG: ICD-10-PCS | Mod: CPTII,S$GLB,, | Performed by: INTERNAL MEDICINE

## 2022-08-12 PROCEDURE — 99213 OFFICE O/P EST LOW 20 MIN: CPT | Mod: S$GLB,,, | Performed by: INTERNAL MEDICINE

## 2022-08-12 PROCEDURE — 99999 PR PBB SHADOW E&M-EST. PATIENT-LVL III: ICD-10-PCS | Mod: PBBFAC,,, | Performed by: INTERNAL MEDICINE

## 2022-08-12 PROCEDURE — 3008F PR BODY MASS INDEX (BMI) DOCUMENTED: ICD-10-PCS | Mod: CPTII,S$GLB,, | Performed by: INTERNAL MEDICINE

## 2022-08-12 PROCEDURE — 71046 X-RAY EXAM CHEST 2 VIEWS: CPT | Mod: 26,,, | Performed by: RADIOLOGY

## 2022-08-12 PROCEDURE — 3079F DIAST BP 80-89 MM HG: CPT | Mod: CPTII,S$GLB,, | Performed by: INTERNAL MEDICINE

## 2022-08-12 PROCEDURE — 99213 OFFICE O/P EST LOW 20 MIN: CPT | Mod: PBBFAC | Performed by: INTERNAL MEDICINE

## 2022-08-12 PROCEDURE — 3008F BODY MASS INDEX DOCD: CPT | Mod: CPTII,S$GLB,, | Performed by: INTERNAL MEDICINE

## 2022-08-12 PROCEDURE — 3074F SYST BP LT 130 MM HG: CPT | Mod: CPTII,S$GLB,, | Performed by: INTERNAL MEDICINE

## 2022-08-12 PROCEDURE — 71046 X-RAY EXAM CHEST 2 VIEWS: CPT | Mod: TC,FY

## 2022-08-12 PROCEDURE — 1160F PR REVIEW ALL MEDS BY PRESCRIBER/CLIN PHARMACIST DOCUMENTED: ICD-10-PCS | Mod: CPTII,S$GLB,, | Performed by: INTERNAL MEDICINE

## 2022-08-12 PROCEDURE — 99213 PR OFFICE/OUTPT VISIT, EST, LEVL III, 20-29 MIN: ICD-10-PCS | Mod: S$GLB,,, | Performed by: INTERNAL MEDICINE

## 2022-08-12 PROCEDURE — 99999 PR PBB SHADOW E&M-EST. PATIENT-LVL III: CPT | Mod: PBBFAC,,, | Performed by: INTERNAL MEDICINE

## 2022-08-12 PROCEDURE — 71046 XR CHEST PA AND LATERAL: ICD-10-PCS | Mod: 26,,, | Performed by: RADIOLOGY

## 2022-08-12 PROCEDURE — 1160F RVW MEDS BY RX/DR IN RCRD: CPT | Mod: CPTII,S$GLB,, | Performed by: INTERNAL MEDICINE

## 2022-08-12 PROCEDURE — 1159F PR MEDICATION LIST DOCUMENTED IN MEDICAL RECORD: ICD-10-PCS | Mod: CPTII,S$GLB,, | Performed by: INTERNAL MEDICINE

## 2022-08-12 PROCEDURE — 3079F PR MOST RECENT DIASTOLIC BLOOD PRESSURE 80-89 MM HG: ICD-10-PCS | Mod: CPTII,S$GLB,, | Performed by: INTERNAL MEDICINE

## 2022-08-12 PROCEDURE — 1159F MED LIST DOCD IN RCRD: CPT | Mod: CPTII,S$GLB,, | Performed by: INTERNAL MEDICINE

## 2022-08-12 NOTE — PROGRESS NOTES
[facility]    Chief Complaint  Chief Complaint   Patient presents with    Congestive Heart Failure       HPI:    Symptoms are improved.  Less exertional dyspnea.  Not waking up at night short of breath.  Blood pressures are better.    Medications  Current Outpatient Medications   Medication Sig Dispense Refill    aspirin 81 MG Chew Take 1 tablet (81 mg total) by mouth 2 (two) times daily. (Patient taking differently: Take 81 mg by mouth once daily.) 60 tablet 0    BD ALCOHOL SWABS PadM       carvediloL (COREG) 6.25 MG tablet Take 6.25 mg by mouth 2 (two) times daily with meals.      ezetimibe (ZETIA) 10 mg tablet Take 10 mg by mouth once daily.      hydroCHLOROthiazide (HYDRODIURIL) 25 MG tablet Take 1 tablet (25 mg total) by mouth once daily. 30 tablet 11    HYDROcodone-acetaminophen (NORCO)  mg per tablet Take 1 tablet by mouth after meals as needed.      meloxicam (MOBIC) 15 MG tablet Take 1 tablet (15 mg total) by mouth once daily. 10 tablet 0    metFORMIN (GLUCOPHAGE) 500 MG tablet Take 500 mg by mouth 2 (two) times daily.      triamcinolone acetonide 0.1% (KENALOG) 0.1 % ointment Apply topically 2 (two) times daily. 454 g 3    TRUE METRIX GLUCOSE TEST STRIP Strp       TRUEPLUS LANCETS 33 gauge Misc        No current facility-administered medications for this visit.        History  Past Medical History:   Diagnosis Date    Diabetes mellitus     Gout     Hepatitis C     Hypercholesteremia     Hypertension      Past Surgical History:   Procedure Laterality Date    ARTHROPLASTY, KNEE, TOTAL, SIGHT ASSISTED Left 7/26/2021    Procedure: ARTHROPLASTY, KNEE, TOTAL, SIGHT ASSISTED;  Surgeon: Jan Rust MD;  Location: [facility];  Service: Orthopedics;  Laterality: Left;    BACK SURGERY      CORONARY ANGIOGRAPHY  08/24/2016    no CAD    FRACTURE SURGERY Left     leg    FUSION OF SPINE WITH INSTRUMENTATION N/A 12/20/2018    Procedure: FUSION, SPINE, WITH INSTRUMENTATION  Removal of spinal hardware, Bilateral Jamison-White Osteotomy L5-S1, L5-S1 Interbody Arthrodesis, L4-S1 Segmental Instrumentation with Posterior Lateral Arthrodesis;  Surgeon: Ezequiel Pedroza MD;  Location: [facility];  Service: Neurosurgery;  Laterality: N/A;  Procedure: Removal of spinal hardware, Revision of Posterolateral fusion from L3-S1, L5-S1 In    HIP SURGERY Right     hip fusion    INJECTION OF STEROID Right 3/22/2019    Procedure: INJECTION, STEROID Right SI Joint Block and Steroid Injection;  Surgeon: Ezequiel Pedroza MD;  Location: [facility] OR;  Service: Neurosurgery;  Laterality: Right;  Procedure: Right SI Joint Block and Steroid Injection  Surgery Time: 15 Min  LOS:0  Anesthesia: General  Radiology: C-Arm  Bed: Regular Bed  Position: Prone    KNEE ARTHROSCOPY      LAMINECTOMY      Lumbar    MYELOGRAPHY N/A 11/5/2018    Procedure: Myelogram;  Surgeon: Mel Diagnostic Provider;  Location: [address];  Service: Radiology;  Laterality: N/A;    right wrist surgery      TRIAL OF SPINAL CORD NERVE STIMULATOR N/A 9/20/2019    Procedure: Trial, Neurostimulator, Spinal Cord Spinal Cord Stimulator Trial using 2 Percutaneous Least at T9-10;  Surgeon: Ezequiel Pedroza MD;  Location: [facility] OR [address] [place] [address];  Service: Neurosurgery;  Laterality: N/A;  Procedure: Spinal Cord Stimulator Trial using 2 Percutaneous Least at T9-10  Surgery Time: 1 Hr  LOS: 0  Anesthesia: General  Radiology: C-arm  Bed: [place] [id number] Poster  Position: Prone  Eq     Social History     Socioeconomic History    Marital status:    Tobacco Use    Smoking status: Former Smoker    Smokeless tobacco: Never Used    Tobacco comment: quit over 30yrs   Substance and Sexual Activity    Alcohol use: No    Drug use: Yes     Types: Marijuana     Comment: daily     Family History   Problem Relation Age of Onset    Diabetes Sister     Hypertension Sister     Diabetes Brother     Hypertension Brother     Melanoma Neg Hx     Psoriasis Neg Hx   "   Lupus Neg Hx         Allergies  Review of patient's allergies indicates:   Allergen Reactions    Lactose     Gabapentin Other (See Comments)     Patient states, "it makes me bleed from my rectum. Don't give it to me."    Atorvastatin Rash     Dye used    Penicillins Rash     Pt states a "a couple of years ago" he took penicillin and broke out in "red spots" with no itching or difficulty breathing       Review of Systems   Review of Systems   Constitutional: Negative for malaise/fatigue, weight gain and weight loss.   Eyes: Negative for visual disturbance.   Cardiovascular: Positive for dyspnea on exertion (better). Negative for chest pain, claudication, cyanosis, irregular heartbeat, leg swelling, near-syncope, orthopnea, palpitations, paroxysmal nocturnal dyspnea and syncope.   Respiratory: Negative for cough, hemoptysis, shortness of breath, sleep disturbances due to breathing and wheezing.    Hematologic/Lymphatic: Negative for bleeding problem. Does not bruise/bleed easily.   Skin: Negative for poor wound healing.   Musculoskeletal: Positive for back pain and joint pain. Negative for muscle cramps and myalgias.   Gastrointestinal: Negative for abdominal pain, anorexia, diarrhea, heartburn, hematemesis, hematochezia, melena, nausea and vomiting.   Genitourinary: Negative for hematuria and nocturia.   Neurological: Negative for excessive daytime sleepiness, dizziness, focal weakness, light-headedness and weakness.       Physical Exam  Vitals:    08/12/22 0941   BP: 126/84   Pulse: 80     Wt Readings from Last 1 Encounters:   08/12/22 102.9 kg (226 lb 14.4 oz)     Physical Exam  Constitutional:       General: He is not in acute distress.     Appearance: He is not toxic-appearing or diaphoretic.   HENT:      Head: Normocephalic and atraumatic.   Eyes:      General: No scleral icterus.     Conjunctiva/sclera: Conjunctivae normal.   Neck:      Thyroid: No thyromegaly.      Vascular: No carotid bruit, " hepatojugular reflux or JVD.      Trachea: No tracheal deviation.   Cardiovascular:      Rate and Rhythm: Normal rate and regular rhythm.  No extrasystoles are present.     Chest Wall: PMI is not displaced.      Pulses:           Carotid pulses are 2+ on the right side and 2+ on the left side.       Radial pulses are 2+ on the right side and 2+ on the left side.        Dorsalis pedis pulses are 2+ on the right side and 2+ on the left side.        Posterior tibial pulses are 2+ on the right side and 2+ on the left side.      Heart sounds: S1 normal and S2 normal. No murmur heard.    No S3 or S4 sounds.   Pulmonary:      Effort: No accessory muscle usage or respiratory distress.      Breath sounds: No decreased breath sounds, wheezing, rhonchi or rales.   Abdominal:      General: Bowel sounds are normal.      Palpations: Abdomen is soft.      Tenderness: There is no abdominal tenderness.   Musculoskeletal:         General: No tenderness or deformity.      Cervical back: Neck supple.      Right lower leg: No edema.      Left lower leg: No edema.   Skin:     General: Skin is warm and dry.      Coloration: Skin is not pale.      Nails: There is no clubbing.   Neurological:      Mental Status: He is alert and oriented to person, place, and time.      Cranial Nerves: No cranial nerve deficit.      Sensory: No sensory deficit.   Psychiatric:         Speech: Speech normal.         Behavior: Behavior normal. Behavior is cooperative.         Labs  Lab Visit on 07/21/2022   Component Date Value Ref Range Status    Sodium 07/21/2022 138  136 - 145 mmol/L Final    Potassium 07/21/2022 4.3  3.5 - 5.1 mmol/L Final    Chloride 07/21/2022 100  95 - 110 mmol/L Final    CO2 07/21/2022 26  23 - 29 mmol/L Final    Glucose 07/21/2022 238 (A) 70 - 110 mg/dL Final    BUN 07/21/2022 16  8 - 23 mg/dL Final    Creatinine 07/21/2022 1.2  0.5 - 1.4 mg/dL Final    Calcium 07/21/2022 10.0  8.7 - 10.5 mg/dL Final    Anion Gap 07/21/2022 12   8 - 16 mmol/L Final    eGFR if African American 07/21/2022 >60  >60 mL/min/1.73 m^2 Final    eGFR if non African American 07/21/2022 >60  >60 mL/min/1.73 m^2 Final    Comment: Calculation used to obtain the estimated glomerular filtration  rate (eGFR) is the CKD-EPI equation.       Magnesium 07/21/2022 1.9  1.6 - 2.6 mg/dL Final       Imaging  No results found.    Assessment  1. Essential hypertension  Better controlled    2. Hypertensive heart disease with acute on chronic diastolic congestive heart failure  Now compensated      Plan and Discussion    Continue present management.    The ASCVD Risk score (Hardy DC Jr., et al., 2013) failed to calculate for the following reasons:    Cannot find a previous HDL lab    Cannot find a previous total cholesterol lab     Follow Up  Follow up in about 3 months (around 11/12/2022).      Elan Smith MD

## 2022-08-31 ENCOUNTER — TELEPHONE (OUTPATIENT)
Dept: NEUROSURGERY | Facility: CLINIC | Age: 62
End: 2022-08-31

## 2022-08-31 ENCOUNTER — TELEPHONE (OUTPATIENT)
Dept: NEUROSURGERY | Facility: CLINIC | Age: 62
End: 2022-08-31
Payer: MEDICARE

## 2022-08-31 ENCOUNTER — HOSPITAL ENCOUNTER (OUTPATIENT)
Dept: RADIOLOGY | Facility: HOSPITAL | Age: 62
Discharge: HOME | End: 2022-08-31
Attending: PHYSICIAN ASSISTANT
Payer: MEDICARE

## 2022-08-31 ENCOUNTER — OFFICE VISIT (OUTPATIENT)
Dept: NEUROSURGERY | Facility: CLINIC | Age: 62
End: 2022-08-31
Payer: MEDICARE

## 2022-08-31 VITALS — WEIGHT: 224.88 LBS | BODY MASS INDEX: 28.86 KG/M2 | HEIGHT: 74 IN

## 2022-08-31 DIAGNOSIS — M54.50 ACUTE RIGHT-SIDED LOW BACK PAIN WITHOUT SCIATICA: Primary | ICD-10-CM

## 2022-08-31 DIAGNOSIS — Z98.1 S/P LUMBAR SPINAL FUSION: ICD-10-CM

## 2022-08-31 DIAGNOSIS — M51.37 DDD (DEGENERATIVE DISC DISEASE), LUMBOSACRAL: Primary | ICD-10-CM

## 2022-08-31 DIAGNOSIS — M51.37 DDD (DEGENERATIVE DISC DISEASE), LUMBOSACRAL: ICD-10-CM

## 2022-08-31 PROCEDURE — 99999 PR PBB SHADOW E&M-EST. PATIENT-LVL III: CPT | Mod: PBBFAC,,, | Performed by: PHYSICIAN ASSISTANT

## 2022-08-31 PROCEDURE — 99214 OFFICE O/P EST MOD 30 MIN: CPT | Mod: S$GLB,,, | Performed by: PHYSICIAN ASSISTANT

## 2022-08-31 PROCEDURE — 1160F PR REVIEW ALL MEDS BY PRESCRIBER/CLIN PHARMACIST DOCUMENTED: ICD-10-PCS | Mod: CPTII,S$GLB,, | Performed by: PHYSICIAN ASSISTANT

## 2022-08-31 PROCEDURE — 1160F RVW MEDS BY RX/DR IN RCRD: CPT | Mod: CPTII,S$GLB,, | Performed by: PHYSICIAN ASSISTANT

## 2022-08-31 PROCEDURE — 1159F PR MEDICATION LIST DOCUMENTED IN MEDICAL RECORD: ICD-10-PCS | Mod: CPTII,S$GLB,, | Performed by: PHYSICIAN ASSISTANT

## 2022-08-31 PROCEDURE — 3008F PR BODY MASS INDEX (BMI) DOCUMENTED: ICD-10-PCS | Mod: CPTII,S$GLB,, | Performed by: PHYSICIAN ASSISTANT

## 2022-08-31 PROCEDURE — 1159F MED LIST DOCD IN RCRD: CPT | Mod: CPTII,S$GLB,, | Performed by: PHYSICIAN ASSISTANT

## 2022-08-31 PROCEDURE — 99214 PR OFFICE/OUTPT VISIT, EST, LEVL IV, 30-39 MIN: ICD-10-PCS | Mod: S$GLB,,, | Performed by: PHYSICIAN ASSISTANT

## 2022-08-31 PROCEDURE — 99999 PR PBB SHADOW E&M-EST. PATIENT-LVL III: ICD-10-PCS | Mod: PBBFAC,,, | Performed by: PHYSICIAN ASSISTANT

## 2022-08-31 PROCEDURE — 3008F BODY MASS INDEX DOCD: CPT | Mod: CPTII,S$GLB,, | Performed by: PHYSICIAN ASSISTANT

## 2022-08-31 PROCEDURE — 99213 OFFICE O/P EST LOW 20 MIN: CPT | Mod: PBBFAC,PN | Performed by: PHYSICIAN ASSISTANT

## 2022-08-31 PROCEDURE — 72082 X-RAY EXAM ENTIRE SPI 2/3 VW: CPT | Mod: TC,FY

## 2022-08-31 PROCEDURE — 72082 X-RAY EXAM ENTIRE SPI 2/3 VW: CPT | Mod: 26,,, | Performed by: INTERNAL MEDICINE

## 2022-08-31 PROCEDURE — 72082 XR SCOLIOSIS COMPLETE: ICD-10-PCS | Mod: 26,,, | Performed by: INTERNAL MEDICINE

## 2022-08-31 NOTE — TELEPHONE ENCOUNTER
Dr. Pedroza,    This patient had a lumbar spinal fusion by you in 2018.  He had a right SI joint injection which he does not remember getting and spinal cord stimulator trial that did not help.    On July 1st he fell forward and since then has been having pain in the right low back.  It is worse when he gets up in the morning and gets better as throughout the day.  No new leg symptoms.  He had scoliosis x-rays today which show the hardware is intact.  I think he might have right SI joint dysfunction but I was unable to do the test this because of his hip replacement and could not move his right hip.      I wanted to see if you wanted him to follow-up in clinic or to schedule him for right SI joint injection?      Thanks,  Marta Salazar, [facility], PA-C  Neurosurgery  [facility]  08/31/2022

## 2022-08-31 NOTE — PROGRESS NOTES
Subjective:     Patient ID:  Ezekiel Noyola is a 62 y.o. male.    [place]    Chief Complaint: Right low back pain    DATE OF PROCEDURE: 12/20/2018      PREOPERATIVE DIAGNOSES:      1. S/P L3 to S1 fusion  2. Pseudoarthsosis at L5-S1 with failure of instrumentation  3. Severe bilateral L5-S1 foraminal stenosis  4. Chronic refractory back pain and bilateral legs pain  5. 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 S/P L3 to S1 fusion  2. Pseudoarthsosis at L5-S1 with failure of instrumentation  3. Severe bilateral L5-S1 foraminal stenosis  4. Chronic refractory back pain and bilateral legs pain  5. Sagittal plane imbalance with lumbar lordosis-pelvic incidence mismatch      NAME OF OPERATIONS:  1. Posterior open approach to the lumbar spine.  2. Removal of posterior instrumentation at L3-4, L4-5, L5-S1  3. Bilateral Fernando osteotomies at L5-S1 to improve lordosis at L5-S1  4. Left transforaminal interbody arthrodesis at L5-S1 using Vivigen cellular allograft and autograft taken from the same incision  5. Placement of an Globus Rise TLIF interbody cage  6. L4-5 and L5-S1 revision of posterolateral arthrodesis with autograft harvested from the same incision and Vivigen allograft  7. Segmental instrumentation at L4-5 and L5-S1  8. Neurophysiologic monitoring  9. Fluoroscopic localization.      PRIMARY SURGEON: Ezequiel Pedroza MD     ASSISTANT SURGEON: Wes POLK was the first assistant for this procedure as there was no qualified resident available to assist.       HPI    Ezekiel Noyola is a 62 y.o. male who presents for follow up.  Patient had a lumbar spine fusion with Dr. Pedroza in the past.  He also had a right SI joint injection which she does not remember having.  He had a spinal cord stimulator trial that did not help.  He was referred back to Pain Management at [facility].     He has had chronic pain but on July 1st he fell  "forward and had increased low back pain in the right low back.  He also hears a clicking sound.  The pain is worse in the morning when he 1st gets up out of bed.  This gets slightly better as he walks and moves.  He denies any new leg pain or paresthesias.      He takes hydrocodone for pain.  He smokes marijuana for pain.    Patient denies any recent accidents or trauma, no saddle anesthesias, and no bowel or bladder incontinence.      Review of Systems:  Constitution: Negative for chills, fever, night sweats and weight loss.   Musculoskeletal: Negative for falls.   Gastrointestinal: Negative for bowel incontinence, nausea and vomiting.   Genitourinary: Negative for bladder incontinence.   Neurological: Negative for disturbances in coordination and loss of balance.      Objective:      Vitals:    08/31/22 1531   Weight: 102 kg (224 lb 13.9 oz)   Height: 6' 2" (1.88 m)   PainSc: 0-No pain         Physical Exam:      General:  Ezekiel Noyola is well-developed, well-nourished, appears stated age, in no acute distress, alert and oriented to person, place, and time.    Pulmonary/Chest:  Respiratory effort normal  Abdominal: Exhibits no distension  Psychiatric:  Normal mood and affect.  Behavior is normal.  Judgement and thought content normal      Musculoskeletal:        Lumbar Spine Inspection:  Healed surgical scars with no visible rashes.    Lumbar Spine Palpation:  Mild tenderness to low back palpation.    SI Joint Palpation:  No tenderness to left SI Joint palpation.  Tenderness to right SI joint palpation.    Straight Leg Raise:  Cannot assess      Neurological:  Alert and oriented to person, place, and time    Muscle strength against resistance:     Right Left   Hip flexion  5 / 5 5 / 5   Hip extension 5 / 5 5 / 5   Hip abduction 5 / 5 5 / 5   Hip adduction  5 / 5 5 / 5   Knee extension  Cannot assess 5 / 5   Knee flexion Cannot assess 5 / 5   Dorsiflexion  5 / 5 5 / 5   EHL  5 / 5 5 / 5   Plantar flexion  5 / 5 5 / " 5   Inversion of the feet 5 / 5 5 / 5   Eversion of the feet  5 / 5 5 / 5           On gross examination of the bilateral upper extremities, patient has full painfree ROM with no signs of clubbing, cyanosis, edema, or weakness.       XRAY Interpretation:     Scoliosis x-ray shows good hardware placement.  No new fractures.      Assessment:          1. Acute right-sided low back pain without sciatica    2. S/P lumbar spinal fusion            Plan:               Patient's hardware looks fine from previous lumbar fusion.  He may have right SI joint dysfunction.  I will review this further with Dr. Pedroza to see if he would like to see him in clinic or schedule a right SI joint injection.    Follow-Up:  Follow up if symptoms worsen or fail to improve. If there are any questions prior to this, the patient was instructed to contact the office.       SHERLY Barrientos PA-C  Neurosurgery  [facility]  08/31/2022      Addendum:    09/02/2022     I reviewed everything with Dr. Pedroza.  He recommends a repeat right SI joint block and steroid injection.    SHERLY Barrientos PA-C  Neurosurgery  [facility]

## 2022-09-20 ENCOUNTER — TELEPHONE (OUTPATIENT)
Dept: NEUROSURGERY | Facility: CLINIC | Age: 62
End: 2022-09-20
Payer: MEDICARE

## 2022-09-29 ENCOUNTER — TELEPHONE (OUTPATIENT)
Dept: NEUROSURGERY | Facility: CLINIC | Age: 62
End: 2022-09-29
Payer: MEDICARE

## 2022-10-03 ENCOUNTER — TELEPHONE (OUTPATIENT)
Dept: NEUROSURGERY | Facility: CLINIC | Age: 62
End: 2022-10-03
Payer: MEDICARE

## 2022-10-03 NOTE — TELEPHONE ENCOUNTER
Called pt to see if he was still interested in Si Injection w/ . Pt states he does not want injection but would like f/u appt. Scheduled appt for 25 Nov @0900.

## 2022-11-25 ENCOUNTER — TELEPHONE (OUTPATIENT)
Dept: NEUROSURGERY | Facility: CLINIC | Age: 62
End: 2022-11-25

## 2022-11-25 ENCOUNTER — OFFICE VISIT (OUTPATIENT)
Dept: NEUROSURGERY | Facility: CLINIC | Age: 62
End: 2022-11-25
Payer: MEDICARE

## 2022-11-25 VITALS
BODY MASS INDEX: 28.86 KG/M2 | DIASTOLIC BLOOD PRESSURE: 84 MMHG | HEIGHT: 74 IN | SYSTOLIC BLOOD PRESSURE: 126 MMHG | WEIGHT: 224.88 LBS | HEART RATE: 80 BPM

## 2022-11-25 DIAGNOSIS — M53.3 SI (SACROILIAC) JOINT DYSFUNCTION: ICD-10-CM

## 2022-11-25 DIAGNOSIS — Z98.1 STATUS POST LUMBAR SPINAL FUSION: Primary | ICD-10-CM

## 2022-11-25 PROCEDURE — 1159F MED LIST DOCD IN RCRD: CPT | Mod: CPTII,S$GLB,, | Performed by: NEUROLOGICAL SURGERY

## 2022-11-25 PROCEDURE — 99999 PR PBB SHADOW E&M-EST. PATIENT-LVL III: ICD-10-PCS | Mod: PBBFAC,,, | Performed by: NEUROLOGICAL SURGERY

## 2022-11-25 PROCEDURE — 3008F PR BODY MASS INDEX (BMI) DOCUMENTED: ICD-10-PCS | Mod: CPTII,S$GLB,, | Performed by: NEUROLOGICAL SURGERY

## 2022-11-25 PROCEDURE — 99999 PR PBB SHADOW E&M-EST. PATIENT-LVL III: CPT | Mod: PBBFAC,,, | Performed by: NEUROLOGICAL SURGERY

## 2022-11-25 PROCEDURE — 3008F BODY MASS INDEX DOCD: CPT | Mod: CPTII,S$GLB,, | Performed by: NEUROLOGICAL SURGERY

## 2022-11-25 PROCEDURE — 3074F SYST BP LT 130 MM HG: CPT | Mod: CPTII,S$GLB,, | Performed by: NEUROLOGICAL SURGERY

## 2022-11-25 PROCEDURE — 99213 OFFICE O/P EST LOW 20 MIN: CPT | Mod: S$GLB,,, | Performed by: NEUROLOGICAL SURGERY

## 2022-11-25 PROCEDURE — 1159F PR MEDICATION LIST DOCUMENTED IN MEDICAL RECORD: ICD-10-PCS | Mod: CPTII,S$GLB,, | Performed by: NEUROLOGICAL SURGERY

## 2022-11-25 PROCEDURE — 3079F PR MOST RECENT DIASTOLIC BLOOD PRESSURE 80-89 MM HG: ICD-10-PCS | Mod: CPTII,S$GLB,, | Performed by: NEUROLOGICAL SURGERY

## 2022-11-25 PROCEDURE — 99213 PR OFFICE/OUTPT VISIT, EST, LEVL III, 20-29 MIN: ICD-10-PCS | Mod: S$GLB,,, | Performed by: NEUROLOGICAL SURGERY

## 2022-11-25 PROCEDURE — 3079F DIAST BP 80-89 MM HG: CPT | Mod: CPTII,S$GLB,, | Performed by: NEUROLOGICAL SURGERY

## 2022-11-25 PROCEDURE — 3074F PR MOST RECENT SYSTOLIC BLOOD PRESSURE < 130 MM HG: ICD-10-PCS | Mod: CPTII,S$GLB,, | Performed by: NEUROLOGICAL SURGERY

## 2022-11-25 NOTE — PROGRESS NOTES
NEUROSURGICAL PROGRESS NOTE    DATE OF SERVICE:  11/25/2022    ATTENDING PHYSICIAN:  Ezequiel Pedroza MD    SUBJECTIVE:    INTERIM HISTORY:    This is a very pleasant 62 y.o. male, who is 4 years status post revision of L4-S1 fusion with posterior transforaminal interbody fusion at L5-S1.  He has been complaining of a popping sound in his right lumbosacral area.  Does sound is associated with pain that is intermittent.  He is wearing a TLSO brace to keep his posture more upright.  He says that the right lumbosacral pain is just intermittent and not causing him too much suffering at this time.  No new onset of more weakness numbness or sphincter dysfunction symptoms.  He came here today because he was concerned that something might be wrong with his spinal hardware.  He also would like to have a new TLSO brace because the one he has at this time has significant wear and is not functioning properly.              PAST MEDICAL HISTORY:  Active Ambulatory Problems     Diagnosis Date Noted    Bursitis of hip 05/30/2014    DDD (degenerative disc disease), lumbosacral 05/30/2014    Lumbar spondylosis 05/30/2014    Lumbar radiculopathy 05/30/2014    History of back surgery 05/30/2014    Lumbar spinal stenosis 05/30/2014    Hx of surgical fusion joint 05/30/2014    Thoracic or lumbosacral neuritis or radiculitis, unspecified 06/20/2014    Spinal stenosis of lumbar region with radiculopathy 12/20/2018    Sacroiliac joint dysfunction of right side 03/22/2019    Spinal stenosis 09/20/2019    Essential hypertension 02/01/2021    Diabetes mellitus, type 2 02/01/2021     Resolved Ambulatory Problems     Diagnosis Date Noted    Positive cardiac stress test     Chest pain 08/23/2016    Dyspnea 08/23/2016    Osteoarthritis of left knee 07/26/2021     Past Medical History:   Diagnosis Date    Diabetes mellitus     Gout     Hepatitis C     Hypercholesteremia     Hypertension        PAST SURGICAL HISTORY:  Past Surgical History:    Procedure Laterality Date    ARTHROPLASTY, KNEE, TOTAL, SIGHT ASSISTED Left 7/26/2021    Procedure: ARTHROPLASTY, KNEE, TOTAL, SIGHT ASSISTED;  Surgeon: Jan Rust MD;  Location: [facility];  Service: Orthopedics;  Laterality: Left;    BACK SURGERY      CORONARY ANGIOGRAPHY  08/24/2016    no CAD    FRACTURE SURGERY Left     leg    FUSION OF SPINE WITH INSTRUMENTATION N/A 12/20/2018    Procedure: FUSION, SPINE, WITH INSTRUMENTATION Removal of spinal hardware, Bilateral Jamison-White Osteotomy L5-S1, L5-S1 Interbody Arthrodesis, L4-S1 Segmental Instrumentation with Posterior Lateral Arthrodesis;  Surgeon: Ezequiel Pedroza MD;  Location: [facility];  Service: Neurosurgery;  Laterality: N/A;  Procedure: Removal of spinal hardware, Revision of Posterolateral fusion from L3-S1, L5-S1 In    HIP SURGERY Right     hip fusion    INJECTION OF STEROID Right 3/22/2019    Procedure: INJECTION, STEROID Right SI Joint Block and Steroid Injection;  Surgeon: Ezequiel Pedroza MD;  Location: [facility];  Service: Neurosurgery;  Laterality: Right;  Procedure: Right SI Joint Block and Steroid Injection  Surgery Time: 15 Min  LOS:0  Anesthesia: General  Radiology: C-Arm  Bed: Regular Bed  Position: Prone    KNEE ARTHROSCOPY      LAMINECTOMY      Lumbar    MYELOGRAPHY N/A 11/5/2018    Procedure: Myelogram;  Surgeon: [facility] Diagnostic Provider;  Location: [address];  Service: Radiology;  Laterality: N/A;    right wrist surgery      TRIAL OF SPINAL CORD NERVE STIMULATOR N/A 9/20/2019    Procedure: Trial, Neurostimulator, Spinal Cord Spinal Cord Stimulator Trial using 2 Percutaneous Least at T9-10;  Surgeon: Ezequiel Pedroza MD;  Location: [address];  Service: Neurosurgery;  Laterality: N/A;  Procedure: Spinal Cord Stimulator Trial using 2 Percutaneous Least at T9-10  Surgery Time: 1 Hr  LOS: 0  Anesthesia: General  Radiology: C-arm  Bed: [place] 4 Poster  Position: Prone  Eq       SOCIAL HISTORY:   Social History     Socioeconomic  "History    Marital status:    Tobacco Use    Smoking status: Former     Passive exposure: Never    Smokeless tobacco: Never    Tobacco comments:     quit over 30yrs   Substance and Sexual Activity    Alcohol use: No    Drug use: Yes     Types: Marijuana     Comment: daily       FAMILY HISTORY:  Family History   Problem Relation Age of Onset    Diabetes Sister     Hypertension Sister     Diabetes Brother     Hypertension Brother     Melanoma Neg Hx     Psoriasis Neg Hx     Lupus Neg Hx        CURRENTS MEDICATIONS:  Current Outpatient Medications on File Prior to Visit   Medication Sig Dispense Refill    aspirin 81 MG Chew Take 1 tablet (81 mg total) by mouth 2 (two) times daily. (Patient taking differently: Take 81 mg by mouth once daily.) 60 tablet 0    carvediloL (COREG) 6.25 MG tablet Take 6.25 mg by mouth 2 (two) times daily with meals.      ezetimibe (ZETIA) 10 mg tablet Take 10 mg by mouth once daily.      HYDROcodone-acetaminophen (NORCO)  mg per tablet Take 1 tablet by mouth after meals as needed.      meloxicam (MOBIC) 15 MG tablet Take 1 tablet (15 mg total) by mouth once daily. 10 tablet 0    metFORMIN (GLUCOPHAGE) 500 MG tablet Take 500 mg by mouth 2 (two) times daily.      triamcinolone acetonide 0.1% (KENALOG) 0.1 % ointment Apply topically 2 (two) times daily. 454 g 3    TRUE METRIX GLUCOSE TEST STRIP Strp       TRUEPLUS LANCETS 33 gauge Misc       BD ALCOHOL SWABS PadM       hydroCHLOROthiazide (HYDRODIURIL) 25 MG tablet Take 1 tablet (25 mg total) by mouth once daily. (Patient not taking: Reported on 11/25/2022) 30 tablet 11     No current facility-administered medications on file prior to visit.       ALLERGIES:  Review of patient's allergies indicates:   Allergen Reactions    Lactose     Gabapentin Other (See Comments)     Patient states, "it makes me bleed from my rectum. Don't give it to me."    Atorvastatin Rash     Dye used    Penicillins Rash     Pt states a "a couple of years " "ago" he took penicillin and broke out in "red spots" with no itching or difficulty breathing       REVIEW OF SYSTEMS:  Review of Systems   Constitutional:  Negative for diaphoresis, fever and weight loss.   Respiratory:  Negative for shortness of breath.    Cardiovascular:  Negative for chest pain.   Gastrointestinal:  Negative for blood in stool.   Genitourinary:  Negative for hematuria.   Endo/Heme/Allergies:  Does not bruise/bleed easily.   All other systems reviewed and are negative.      OBJECTIVE:    PHYSICAL EXAMINATION:   Vitals:    11/25/22 0912   BP: 126/84   Pulse: 80       Physical Exam:  Vitals reviewed.    Constitutional: He appears well-developed and well-nourished.     Eyes: Pupils are equal, round, and reactive to light. Conjunctivae and EOM are normal.     Cardiovascular: Normal distal pulses and no edema.     Abdominal: Soft.     Skin: Skin displays no rash on trunk and no rash on extremities. Skin displays no lesions on trunk and no lesions on extremities.     Psych/Behavior: He is alert. He is oriented to person, place, and time. He has a normal mood and affect.     Musculoskeletal:        Neck: Range of motion is full.     Back Exam     Muscle Strength   Right Quadriceps:  5/5   Left Quadriceps:  5/5   Right Hamstrings:  5/5   Left Hamstrings:  5/5               Neurologic Exam     Mental Status   Oriented to person, place, and time.   Speech: speech is normal   Level of consciousness: alert    Cranial Nerves   Cranial nerves II through XII intact.     CN III, IV, VI   Pupils are equal, round, and reactive to light.  Extraocular motions are normal.     Motor Exam   Muscle bulk: normal  Overall muscle tone: normal    Strength   Right deltoid: 5/5  Left deltoid: 5/5  Right biceps: 5/5  Left biceps: 5/5  Right triceps: 5/5  Left triceps: 5/5  Right wrist flexion: 5/5  Left wrist flexion: 5/5  Right wrist extension: 5/5  Left wrist extension: 5/5  Right interossei: 5/5  Left interossei: 5/5  Left " iliopsoas: 5/5  Right quadriceps: 5/5  Left quadriceps: 5/5  Right hamstrin/5  Left hamstrin/5  Right anterior tibial: 5/5  Left anterior tibial: 5/5  Right posterior tibial: 5/5  Left posterior tibial: 5/5  Right peroneal: 5/5  Left peroneal: 5/5  Right gastroc: 5/5  Left gastroc: 5/5    Sensory Exam   Light touch normal.   Pinprick normal.     Gait, Coordination, and Reflexes     Reflexes   Left plantar: normal      DIAGNOSTIC DATA:  I personally interpreted the following imaging:   Recent CT of the abdomen and reviewed as well as scoliosis films showing consolidation of the fusion from L3-S1, L2-3 degenerative disc disease with loss of disc height, bilateral SI joint spondylosis with vacuum phenomena    ASSESMENT:  This is a 62 y.o. male with     Problem List Items Addressed This Visit    None  Visit Diagnoses       Status post lumbar spinal fusion    -  Primary    SI (sacroiliac) joint dysfunction                  PLAN:  I explained to him that his lumbar fusion is consolidated.  There was no loose hardware.  He has worsening L2-3 degenerative disc disease but I do not think that he is symptomatic from it at this time.  He has possibly right SI joint dysfunction but again he does not seem to have pain that interferes with a functional status at this time.  I will order new TLSO brace since the one he has is not functioning properly  All questions answered  No surgical intervention indicated at this time        Ezequiel Pedroza MD  Cell:[phone]

## 2022-11-25 NOTE — TELEPHONE ENCOUNTER
Emailed patient's TLSO brace order to [employer] rep. Faxed visit note to patient's PCP Dr. Dionisio [phone]

## 2023-02-07 PROBLEM — S39.012A STRAIN OF LUMBAR REGION: Status: ACTIVE | Noted: 2023-02-07

## 2023-02-08 ENCOUNTER — TELEPHONE (OUTPATIENT)
Dept: NEUROSURGERY | Facility: CLINIC | Age: 63
End: 2023-02-08
Payer: MEDICARE

## 2023-02-08 NOTE — TELEPHONE ENCOUNTER
----- Message from Twyla Beard sent at 2/8/2023  3:37 PM CST -----  Type:  Sooner Apoointment Request    Caller is requesting a sooner appointment.  Caller declined first available appointment listed below.  Caller will not accept being placed on the waitlist and is requesting a message be sent to doctor.  Name of Caller: pt  When is the first available appointment? 3/27/23  Symptoms: f/u [facility] visit  - lumbar pain  Would the patient rather a call back or [facility]? call  Best Call Back Number:[phone]  Additional Information:

## 2023-02-09 ENCOUNTER — HOSPITAL ENCOUNTER (OUTPATIENT)
Facility: OTHER | Age: 63
Discharge: HOME | End: 2023-02-11
Attending: EMERGENCY MEDICINE | Admitting: EMERGENCY MEDICINE
Payer: MEDICARE

## 2023-02-09 DIAGNOSIS — R07.9 CHEST PAIN: ICD-10-CM

## 2023-02-09 DIAGNOSIS — N50.819 TESTICULAR PAIN: ICD-10-CM

## 2023-02-09 DIAGNOSIS — N28.0: ICD-10-CM

## 2023-02-09 DIAGNOSIS — N28.0 RENAL INFARCT: Primary | ICD-10-CM

## 2023-02-09 LAB
ALBUMIN SERPL BCP-MCNC: 3.7 G/DL (ref 3.5–5.2)
ALP SERPL-CCNC: 71 U/L (ref 55–135)
ALT SERPL W/O P-5'-P-CCNC: 12 U/L (ref 10–44)
ANION GAP SERPL CALC-SCNC: 10 MMOL/L (ref 8–16)
APTT BLDCRRT: 27.2 SEC (ref 21–32)
AST SERPL-CCNC: 18 U/L (ref 10–40)
BASOPHILS # BLD AUTO: 0.09 K/UL (ref 0–0.2)
BASOPHILS NFR BLD: 0.8 % (ref 0–1.9)
BILIRUB SERPL-MCNC: 0.5 MG/DL (ref 0.1–1)
BILIRUB UR QL STRIP: NEGATIVE
BUN SERPL-MCNC: 11 MG/DL (ref 8–23)
CALCIUM SERPL-MCNC: 9.5 MG/DL (ref 8.7–10.5)
CHLORIDE SERPL-SCNC: 105 MMOL/L (ref 95–110)
CLARITY UR: CLEAR
CO2 SERPL-SCNC: 23 MMOL/L (ref 23–29)
COLOR UR: YELLOW
CREAT SERPL-MCNC: 0.9 MG/DL (ref 0.5–1.4)
D DIMER PPP IA.FEU-MCNC: 0.55 MG/L FEU
DIFFERENTIAL METHOD: ABNORMAL
EOSINOPHIL # BLD AUTO: 0.1 K/UL (ref 0–0.5)
EOSINOPHIL NFR BLD: 0.9 % (ref 0–8)
ERYTHROCYTE [DISTWIDTH] IN BLOOD BY AUTOMATED COUNT: 15.2 % (ref 11.5–14.5)
EST. GFR  (NO RACE VARIABLE): >60 ML/MIN/1.73 M^2
GLUCOSE SERPL-MCNC: 90 MG/DL (ref 70–110)
GLUCOSE UR QL STRIP: NEGATIVE
HCT VFR BLD AUTO: 44.9 % (ref 40–54)
HGB BLD-MCNC: 15 G/DL (ref 14–18)
HGB UR QL STRIP: NEGATIVE
IMM GRANULOCYTES # BLD AUTO: 0.05 K/UL (ref 0–0.04)
IMM GRANULOCYTES NFR BLD AUTO: 0.5 % (ref 0–0.5)
INR PPP: 1 (ref 0.8–1.2)
KETONES UR QL STRIP: ABNORMAL
LACTATE SERPL-SCNC: 1.1 MMOL/L (ref 0.5–2.2)
LEUKOCYTE ESTERASE UR QL STRIP: NEGATIVE
LYMPHOCYTES # BLD AUTO: 1.6 K/UL (ref 1–4.8)
LYMPHOCYTES NFR BLD: 15.1 % (ref 18–48)
MCH RBC QN AUTO: 26.5 PG (ref 27–31)
MCHC RBC AUTO-ENTMCNC: 33.4 G/DL (ref 32–36)
MCV RBC AUTO: 79 FL (ref 82–98)
MONOCYTES # BLD AUTO: 1.4 K/UL (ref 0.3–1)
MONOCYTES NFR BLD: 12.7 % (ref 4–15)
NEUTROPHILS # BLD AUTO: 7.4 K/UL (ref 1.8–7.7)
NEUTROPHILS NFR BLD: 70 % (ref 38–73)
NITRITE UR QL STRIP: NEGATIVE
NRBC BLD-RTO: 0 /100 WBC
PH UR STRIP: 7 [PH] (ref 5–8)
PLATELET # BLD AUTO: 210 K/UL (ref 150–450)
PMV BLD AUTO: 10.3 FL (ref 9.2–12.9)
POCT GLUCOSE: 124 MG/DL (ref 70–110)
POCT GLUCOSE: 87 MG/DL (ref 70–110)
POTASSIUM SERPL-SCNC: 4.1 MMOL/L (ref 3.5–5.1)
PROT SERPL-MCNC: 7.8 G/DL (ref 6–8.4)
PROT UR QL STRIP: ABNORMAL
PROTHROMBIN TIME: 10.9 SEC (ref 9–12.5)
RBC # BLD AUTO: 5.66 M/UL (ref 4.6–6.2)
SODIUM SERPL-SCNC: 138 MMOL/L (ref 136–145)
SP GR UR STRIP: 1.02 (ref 1–1.03)
URN SPEC COLLECT METH UR: ABNORMAL
UROBILINOGEN UR STRIP-ACNC: NEGATIVE EU/DL
WBC # BLD AUTO: 10.63 K/UL (ref 3.9–12.7)

## 2023-02-09 PROCEDURE — 82962 GLUCOSE BLOOD TEST: CPT

## 2023-02-09 PROCEDURE — 80053 COMPREHEN METABOLIC PANEL: CPT | Performed by: EMERGENCY MEDICINE

## 2023-02-09 PROCEDURE — 96372 THER/PROPH/DIAG INJ SC/IM: CPT | Mod: 59 | Performed by: NURSE PRACTITIONER

## 2023-02-09 PROCEDURE — 96376 TX/PRO/DX INJ SAME DRUG ADON: CPT

## 2023-02-09 PROCEDURE — 85730 THROMBOPLASTIN TIME PARTIAL: CPT | Performed by: HOSPITALIST

## 2023-02-09 PROCEDURE — 99223 PR INITIAL HOSPITAL CARE,LEVL III: ICD-10-PCS | Mod: ,,, | Performed by: NURSE PRACTITIONER

## 2023-02-09 PROCEDURE — 96376 TX/PRO/DX INJ SAME DRUG ADON: CPT | Mod: 59

## 2023-02-09 PROCEDURE — 25500020 PHARM REV CODE 255: Performed by: EMERGENCY MEDICINE

## 2023-02-09 PROCEDURE — G0378 HOSPITAL OBSERVATION PER HR: HCPCS

## 2023-02-09 PROCEDURE — 63600175 PHARM REV CODE 636 W HCPCS: Performed by: NURSE PRACTITIONER

## 2023-02-09 PROCEDURE — 85379 FIBRIN DEGRADATION QUANT: CPT | Performed by: HOSPITALIST

## 2023-02-09 PROCEDURE — 93005 ELECTROCARDIOGRAM TRACING: CPT

## 2023-02-09 PROCEDURE — 87040 BLOOD CULTURE FOR BACTERIA: CPT | Performed by: NURSE PRACTITIONER

## 2023-02-09 PROCEDURE — 63600175 PHARM REV CODE 636 W HCPCS: Performed by: EMERGENCY MEDICINE

## 2023-02-09 PROCEDURE — 85610 PROTHROMBIN TIME: CPT | Performed by: HOSPITALIST

## 2023-02-09 PROCEDURE — 25000003 PHARM REV CODE 250: Performed by: EMERGENCY MEDICINE

## 2023-02-09 PROCEDURE — 93010 EKG 12-LEAD: ICD-10-PCS | Mod: ,,, | Performed by: INTERNAL MEDICINE

## 2023-02-09 PROCEDURE — 99285 EMERGENCY DEPT VISIT HI MDM: CPT | Mod: 25

## 2023-02-09 PROCEDURE — 99223 1ST HOSP IP/OBS HIGH 75: CPT | Mod: ,,, | Performed by: NURSE PRACTITIONER

## 2023-02-09 PROCEDURE — 85025 COMPLETE CBC W/AUTO DIFF WBC: CPT | Performed by: EMERGENCY MEDICINE

## 2023-02-09 PROCEDURE — 36415 COLL VENOUS BLD VENIPUNCTURE: CPT | Performed by: HOSPITALIST

## 2023-02-09 PROCEDURE — 96374 THER/PROPH/DIAG INJ IV PUSH: CPT

## 2023-02-09 PROCEDURE — 96361 HYDRATE IV INFUSION ADD-ON: CPT

## 2023-02-09 PROCEDURE — 93010 ELECTROCARDIOGRAM REPORT: CPT | Mod: ,,, | Performed by: INTERNAL MEDICINE

## 2023-02-09 PROCEDURE — A4216 STERILE WATER/SALINE, 10 ML: HCPCS | Performed by: EMERGENCY MEDICINE

## 2023-02-09 PROCEDURE — 25000003 PHARM REV CODE 250: Performed by: NURSE PRACTITIONER

## 2023-02-09 PROCEDURE — 83605 ASSAY OF LACTIC ACID: CPT | Performed by: NURSE PRACTITIONER

## 2023-02-09 PROCEDURE — 81003 URINALYSIS AUTO W/O SCOPE: CPT | Performed by: EMERGENCY MEDICINE

## 2023-02-09 PROCEDURE — 96375 TX/PRO/DX INJ NEW DRUG ADDON: CPT | Mod: 59

## 2023-02-09 RX ORDER — ONDANSETRON 2 MG/ML
4 INJECTION INTRAMUSCULAR; INTRAVENOUS EVERY 6 HOURS PRN
Status: DISCONTINUED | OUTPATIENT
Start: 2023-02-09 | End: 2023-02-11 | Stop reason: HOSPADM

## 2023-02-09 RX ORDER — ENOXAPARIN SODIUM 100 MG/ML
1 INJECTION SUBCUTANEOUS
Status: DISCONTINUED | OUTPATIENT
Start: 2023-02-09 | End: 2023-02-10

## 2023-02-09 RX ORDER — GLUCAGON 1 MG
1 KIT INJECTION
Status: DISCONTINUED | OUTPATIENT
Start: 2023-02-09 | End: 2023-02-11 | Stop reason: HOSPADM

## 2023-02-09 RX ORDER — HYDROMORPHONE HYDROCHLORIDE 2 MG/ML
2 INJECTION, SOLUTION INTRAMUSCULAR; INTRAVENOUS; SUBCUTANEOUS EVERY 4 HOURS PRN
Status: DISCONTINUED | OUTPATIENT
Start: 2023-02-09 | End: 2023-02-11 | Stop reason: HOSPADM

## 2023-02-09 RX ORDER — ACETAMINOPHEN 325 MG/1
650 TABLET ORAL EVERY 4 HOURS PRN
Status: DISCONTINUED | OUTPATIENT
Start: 2023-02-09 | End: 2023-02-11 | Stop reason: HOSPADM

## 2023-02-09 RX ORDER — ONDANSETRON 2 MG/ML
4 INJECTION INTRAMUSCULAR; INTRAVENOUS
Status: COMPLETED | OUTPATIENT
Start: 2023-02-09 | End: 2023-02-09

## 2023-02-09 RX ORDER — TALC
6 POWDER (GRAM) TOPICAL NIGHTLY PRN
Status: DISCONTINUED | OUTPATIENT
Start: 2023-02-09 | End: 2023-02-11 | Stop reason: HOSPADM

## 2023-02-09 RX ORDER — HYDROMORPHONE HYDROCHLORIDE 1 MG/ML
1 INJECTION, SOLUTION INTRAMUSCULAR; INTRAVENOUS; SUBCUTANEOUS
Status: COMPLETED | OUTPATIENT
Start: 2023-02-09 | End: 2023-02-09

## 2023-02-09 RX ORDER — EZETIMIBE 10 MG/1
10 TABLET ORAL DAILY
Status: DISCONTINUED | OUTPATIENT
Start: 2023-02-10 | End: 2023-02-11 | Stop reason: HOSPADM

## 2023-02-09 RX ORDER — NALOXONE HCL 0.4 MG/ML
0.02 VIAL (ML) INJECTION
Status: DISCONTINUED | OUTPATIENT
Start: 2023-02-09 | End: 2023-02-11 | Stop reason: HOSPADM

## 2023-02-09 RX ORDER — TAMSULOSIN HYDROCHLORIDE 0.4 MG/1
1 CAPSULE ORAL DAILY
COMMUNITY
Start: 2023-02-06

## 2023-02-09 RX ORDER — METFORMIN HYDROCHLORIDE 500 MG/1
500 TABLET, EXTENDED RELEASE ORAL 2 TIMES DAILY WITH MEALS
COMMUNITY
Start: 2023-01-09

## 2023-02-09 RX ORDER — MORPHINE SULFATE 4 MG/ML
4 INJECTION, SOLUTION INTRAMUSCULAR; INTRAVENOUS
Status: COMPLETED | OUTPATIENT
Start: 2023-02-09 | End: 2023-02-09

## 2023-02-09 RX ORDER — HYDROCODONE BITARTRATE AND ACETAMINOPHEN 5; 325 MG/1; MG/1
1 TABLET ORAL EVERY 6 HOURS PRN
Status: DISCONTINUED | OUTPATIENT
Start: 2023-02-09 | End: 2023-02-11 | Stop reason: HOSPADM

## 2023-02-09 RX ORDER — INSULIN ASPART 100 [IU]/ML
0-5 INJECTION, SOLUTION INTRAVENOUS; SUBCUTANEOUS
Status: DISCONTINUED | OUTPATIENT
Start: 2023-02-09 | End: 2023-02-11 | Stop reason: HOSPADM

## 2023-02-09 RX ORDER — SODIUM CHLORIDE 0.9 % (FLUSH) 0.9 %
10 SYRINGE (ML) INJECTION
Status: DISCONTINUED | OUTPATIENT
Start: 2023-02-09 | End: 2023-02-11 | Stop reason: HOSPADM

## 2023-02-09 RX ORDER — CARVEDILOL 6.25 MG/1
6.25 TABLET ORAL 2 TIMES DAILY WITH MEALS
Status: DISCONTINUED | OUTPATIENT
Start: 2023-02-09 | End: 2023-02-10

## 2023-02-09 RX ORDER — NAPROXEN SODIUM 220 MG/1
81 TABLET, FILM COATED ORAL DAILY
Status: DISCONTINUED | OUTPATIENT
Start: 2023-02-10 | End: 2023-02-11 | Stop reason: HOSPADM

## 2023-02-09 RX ORDER — DIPHENHYDRAMINE HYDROCHLORIDE 50 MG/ML
25 INJECTION INTRAMUSCULAR; INTRAVENOUS
Status: COMPLETED | OUTPATIENT
Start: 2023-02-09 | End: 2023-02-09

## 2023-02-09 RX ORDER — IBUPROFEN 200 MG
24 TABLET ORAL
Status: DISCONTINUED | OUTPATIENT
Start: 2023-02-09 | End: 2023-02-11 | Stop reason: HOSPADM

## 2023-02-09 RX ORDER — IBUPROFEN 200 MG
16 TABLET ORAL
Status: DISCONTINUED | OUTPATIENT
Start: 2023-02-09 | End: 2023-02-11 | Stop reason: HOSPADM

## 2023-02-09 RX ADMIN — Medication 10 ML: at 03:02

## 2023-02-09 RX ADMIN — SODIUM CHLORIDE 1000 ML: 0.9 INJECTION, SOLUTION INTRAVENOUS at 08:02

## 2023-02-09 RX ADMIN — CARVEDILOL 6.25 MG: 6.25 TABLET, FILM COATED ORAL at 05:02

## 2023-02-09 RX ADMIN — ENOXAPARIN SODIUM 100 MG: 100 INJECTION SUBCUTANEOUS at 08:02

## 2023-02-09 RX ADMIN — HYDROMORPHONE HYDROCHLORIDE 1 MG: 1 INJECTION, SOLUTION INTRAMUSCULAR; INTRAVENOUS; SUBCUTANEOUS at 12:02

## 2023-02-09 RX ADMIN — HYDROMORPHONE HYDROCHLORIDE 1 MG: 0.5 INJECTION, SOLUTION INTRAMUSCULAR; INTRAVENOUS; SUBCUTANEOUS at 10:02

## 2023-02-09 RX ADMIN — DIPHENHYDRAMINE HYDROCHLORIDE 25 MG: 50 INJECTION, SOLUTION INTRAMUSCULAR; INTRAVENOUS at 09:02

## 2023-02-09 RX ADMIN — IOHEXOL 100 ML: 350 INJECTION, SOLUTION INTRAVENOUS at 12:02

## 2023-02-09 RX ADMIN — MORPHINE SULFATE 4 MG: 4 INJECTION, SOLUTION INTRAMUSCULAR; INTRAVENOUS at 08:02

## 2023-02-09 RX ADMIN — ONDANSETRON 4 MG: 2 INJECTION INTRAMUSCULAR; INTRAVENOUS at 08:02

## 2023-02-09 RX ADMIN — HYDROMORPHONE HYDROCHLORIDE 2 MG: 2 INJECTION, SOLUTION INTRAMUSCULAR; INTRAVENOUS; SUBCUTANEOUS at 04:02

## 2023-02-09 RX ADMIN — HYDROMORPHONE HYDROCHLORIDE 2 MG: 2 INJECTION, SOLUTION INTRAMUSCULAR; INTRAVENOUS; SUBCUTANEOUS at 08:02

## 2023-02-09 NOTE — ED PROVIDER NOTES
"Encounter Date: 2/9/2023    SCRIBE #1 NOTE: I, Uriel Llamas, am scribing for, and in the presence of,  Peggy Reagan MD.     History     Chief Complaint   Patient presents with    flank pain      Pt brought in by NO EMS Pt c.o left flank pain radiates to groin onset 3 days.   No hx of kidney stone.  Pt seen at [facility] and advised to follow up with neruology. Pt took hydrocodone this AM with no relief. AAO x 3 nadn      Time seen by provider: 8:25 AM    This is a 62 y.o. male who presents via EMS with left flank pain for the past 3 days that acutely worsened this morning. The patient states that his pain was initially localized to his left hip but he now complains of left flank, hip, and groin pain. He was seen 2 days ago at the [place] ED where he had an extensive workup for back pain done. He states he left after receiving multiple injections which temporarily relieved his pain. The patient has chronic back pain and typically takes prescribed Norco 10s for pain, but he states his current pain has been unrelieved. He reports one episode of clear emesis this morning but he denies diarrhea and any current nausea. He also denies any burning with urination, any blood in his urine, or any penile discharge. He is not sexually active. The patient also has a history of DM and he states that his blood sugar has typically been around 100. This is the extent of the patient's complaints at this time.    The history is provided by the patient.   Review of patient's allergies indicates:   Allergen Reactions    Lactose     Gabapentin Other (See Comments)     Patient states, "it makes me bleed from my rectum. Don't give it to me."    Atorvastatin Rash     Dye used    Opioids - morphine analogues Rash    Penicillins Rash     Pt states a "a couple of years ago" he took penicillin and broke out in "red spots" with no itching or difficulty breathing     Past Medical History:   Diagnosis Date    Diabetes mellitus     Gout  "    Hepatitis C     Hypercholesteremia     Hypertension      Past Surgical History:   Procedure Laterality Date    ARTHROPLASTY, KNEE, TOTAL, SIGHT ASSISTED Left 7/26/2021    Procedure: ARTHROPLASTY, KNEE, TOTAL, SIGHT ASSISTED;  Surgeon: Jan Rust MD;  Location: [facility];  Service: Orthopedics;  Laterality: Left;    BACK SURGERY      CORONARY ANGIOGRAPHY  08/24/2016    no CAD    FRACTURE SURGERY Left     leg    FUSION OF SPINE WITH INSTRUMENTATION N/A 12/20/2018    Procedure: FUSION, SPINE, WITH INSTRUMENTATION Removal of spinal hardware, Bilateral Jamison-White Osteotomy L5-S1, L5-S1 Interbody Arthrodesis, L4-S1 Segmental Instrumentation with Posterior Lateral Arthrodesis;  Surgeon: Ezequiel Pedroza MD;  Location: [facility];  Service: Neurosurgery;  Laterality: N/A;  Procedure: Removal of spinal hardware, Revision of Posterolateral fusion from L3-S1, L5-S1 In    HIP SURGERY Right     hip fusion    INJECTION OF STEROID Right 3/22/2019    Procedure: INJECTION, STEROID Right SI Joint Block and Steroid Injection;  Surgeon: Ezequiel Pedroza MD;  Location: [facility];  Service: Neurosurgery;  Laterality: Right;  Procedure: Right SI Joint Block and Steroid Injection  Surgery Time: 15 Min  LOS:0  Anesthesia: General  Radiology: C-Arm  Bed: Regular Bed  Position: Prone    KNEE ARTHROSCOPY      LAMINECTOMY      Lumbar    MYELOGRAPHY N/A 11/5/2018    Procedure: Myelogram;  Surgeon: [facility];  Location: [address];  Service: Radiology;  Laterality: N/A;    right wrist surgery      TRIAL OF SPINAL CORD NERVE STIMULATOR N/A 9/20/2019    Procedure: Trial, Neurostimulator, Spinal Cord Spinal Cord Stimulator Trial using 2 Percutaneous Least at T9-10;  Surgeon: Ezequiel Pedroza MD;  Location: [address];  Service: Neurosurgery;  Laterality: N/A;  Procedure: Spinal Cord Stimulator Trial using 2 Percutaneous Least at T9-10  Surgery Time: 1 Hr  LOS: 0  Anesthesia: General  Radiology: C-arm  Bed: [place] [id number]  Poster  Position: Prone  Eq     Family History   Problem Relation Age of Onset    Diabetes Sister     Hypertension Sister     Diabetes Brother     Hypertension Brother     Melanoma Neg Hx     Psoriasis Neg Hx     Lupus Neg Hx      Social History     Tobacco Use    Smoking status: Former     Passive exposure: Never    Smokeless tobacco: Never    Tobacco comments:     quit over 30yrs   Substance Use Topics    Alcohol use: No    Drug use: Yes     Types: Marijuana     Comment: daily     Review of Systems   Constitutional:  Negative for activity change, appetite change, chills, diaphoresis and fever.   HENT:  Negative for congestion, sore throat and trouble swallowing.    Eyes:  Negative for photophobia and visual disturbance.   Respiratory:  Negative for cough, chest tightness and shortness of breath.    Cardiovascular:  Negative for chest pain.   Gastrointestinal:  Positive for vomiting. Negative for abdominal pain, diarrhea and nausea.   Endocrine: Negative for polydipsia, polyphagia and polyuria.   Genitourinary:  Positive for flank pain. Negative for difficulty urinating, dysuria, hematuria and penile discharge.        +groin pain   Musculoskeletal:  Positive for arthralgias. Negative for back pain and neck pain.   Skin:  Negative for pallor.   Neurological:  Negative for weakness and headaches.   Psychiatric/Behavioral:  Negative for confusion.      Physical Exam     Initial Vitals [02/09/23 0753]   BP Pulse Resp Temp SpO2   (!) 164/96 83 18 98.2 °F (36.8 °C) 98 %      MAP       --         Physical Exam    Nursing note and vitals reviewed.  Constitutional: He appears well-developed and well-nourished.   HENT:   Head: Normocephalic and atraumatic.   Eyes: Conjunctivae are normal.   Neck: Neck supple.   Normal range of motion.  Cardiovascular:  Normal rate, regular rhythm and normal heart sounds.           Pulmonary/Chest: Breath sounds normal. No respiratory distress. He has no wheezes. He has no rales.   Abdominal:  Abdomen is soft. Bowel sounds are normal. He exhibits no distension. There is abdominal tenderness.   Left sided abdominal tenderness. Mild left CVA tenderness.  There is no rebound.   Genitourinary:    Genitourinary Comments: Left scrotal tenderness. No scrotal swelling or erythema.      Musculoskeletal:         General: No tenderness or edema. Normal range of motion.      Cervical back: Normal range of motion and neck supple.     Neurological: He is alert and oriented to person, place, and time.   Ambulatory with steady gait.   Skin: Skin is warm and dry. Capillary refill takes less than 2 seconds.       ED Course   Procedures  Labs Reviewed   URINALYSIS - Abnormal; Notable for the following components:       Result Value    Protein, UA Trace (*)     Ketones, UA 3+ (*)     All other components within normal limits   CBC W/ AUTO DIFFERENTIAL - Abnormal; Notable for the following components:    MCV 79 (*)     MCH 26.5 (*)     RDW 15.2 (*)     Immature Grans (Abs) 0.05 (*)     Mono # 1.4 (*)     Lymph % 15.1 (*)     All other components within normal limits   COMPREHENSIVE METABOLIC PANEL   D DIMER, QUANTITATIVE          Imaging Results               CT Abdomen Pelvis With Contrast (Final result)  Result time 02/09/23 14:20:41      Final result by Adarsh Baires MD (02/09/23 14:20:41)                   Impression:      1. Bilateral renal new multifocal subcentimeter hypoattenuating cortical foci, some of which demonstrate wedge-shaped configuration concerning for multifocal infarcts potentially from embolic etiology.  Sequela of pyelonephritis not excluded.  Clinical correlation and with urinalysis advised.  No hydronephrosis.  2. Diverticulosis coli without diverticulitis.  3. Grossly stable additional findings as above.  This report was flagged in Epic as abnormal.      Electronically signed by: Adarsh Baires MD  Date:    02/09/2023  Time:    14:20               Narrative:    EXAMINATION:  CT ABDOMEN PELVIS WITH  CONTRAST    CLINICAL HISTORY:  Abdominal pain, acute, nonlocalized;    TECHNIQUE:  Low dose axial images, sagittal and coronal reformations were obtained from the lung bases to the pubic symphysis following the IV administration of 100 mL of Omnipaque 350 .  Oral contrast was not given.    COMPARISON:  Scrotal ultrasound same day, CT abdomen and pelvis 07/01/2021, right upper quadrant ultrasound 09/28/2020, renal ultrasound 09/27/2016    FINDINGS:  Imaged lung bases show scattered areas of platelike scarring versus atelectasis and minimal dependent atelectasis without consolidation or pleural effusion.  Base of the heart is normal in size without significant pericardial fluid.    Liver, gallbladder, pancreas, spleen, stomach, duodenum and bilateral adrenal glands are within normal limits.  No biliary ductal dilatation.    Bilateral kidneys are normal in size, shape and location.  Multifocal subcentimeter hypodense cortical foci noted throughout each kidney, some of which appear wedge-shaped, and all are new from 07/01/2021 study.  No hydronephrosis or significant perinephric stranding.  Ureters are nondilated.  Urinary bladder well distended without wall thickening.  Prostate and seminal vesicles are within normal limits.    Appendix and terminal ileum are within normal limits.  Moderate amount of stool noted throughout the colon.  Numerous scattered colonic diverticula without convincing evidence of acute diverticulitis.  No evidence of bowel obstruction or acute inflammation.  No pneumatosis or portal venous gas.    Scattered subcentimeter lymph nodes throughout the abdomen and pelvis and at the bilateral inguinal regions similar to prior, none pathologically enlarged by CT criteria.  No ascites or free air.  No significant atherosclerosis.  No aortic aneurysm or dissection.    Small fat containing umbilical hernia.    There is redemonstrated osseous fusion of the right hip and also lumbar spine, with hardware  present at the lumbar spine, right hip and proximal femur and associated beam hardening with streak artifact.  Osseous structures appear grossly stable without acute or destructive process seen.                                       US Scrotum And Testicles (Final result)  Result time 02/09/23 11:14:17      Final result by Ashley Montes MD (02/09/23 11:14:17)                   Impression:      No significant abnormality.      Electronically signed by: Ashley Montes  Date:    02/09/2023  Time:    11:14               Narrative:    EXAMINATION:  US SCROTUM AND TESTICLES    CLINICAL HISTORY:  Testicular pain, unspecified    TECHNIQUE:  Sonography of the scrotum and testes.    COMPARISON:  None.    FINDINGS:  Right Testicle:    *Size: 4.8 x 3 x 3.3 cm  *Appearance: Normal.  *Flow: Normal arterial and venous flow  *Epididymis: Normal.  *Hydrocele: None.  *Varicocele: None.  .    Left Testicle:    *Size: 4.2 x 3.1 x 3 cm  *Appearance: Normal.  *Flow: Normal arterial and venous flow  *Epididymis: Normal.  *Hydrocele: None.  *Varicocele: None.  .    Other findings: None.                                       Medications   sodium chloride 0.9% bolus 1,000 mL 1,000 mL (0 mLs Intravenous Stopped 2/9/23 1211)   morphine injection 4 mg (4 mg Intravenous Given 2/9/23 0854)   ondansetron injection 4 mg (4 mg Intravenous Given 2/9/23 0854)   diphenhydrAMINE injection 25 mg (25 mg Intravenous Given 2/9/23 0916)   HYDROmorphone injection 1 mg (1 mg Intravenous Given 2/9/23 1039)   iohexoL (OMNIPAQUE 350) injection 100 mL (100 mLs Intravenous Given 2/9/23 1229)   HYDROmorphone injection 1 mg (1 mg Intravenous Given 2/9/23 1243)     Medical Decision Making:   History:   Old Medical Records: I decided to obtain old medical records.  Old Records Summarized: other records, records from clinic visits and records from another hospital.  Initial Assessment:   8:25AM:  Patient is a 62-year-old male who presents to the emergency  department with left-sided flank pain, left-sided abdominal pain and left-sided scrotal pain.  Patient appears well, nontoxic.  He is tender on exam.  Will plan for labs, imaging, will continue to follow and reassess.   Clinical Tests:   Lab Tests: Ordered and Reviewed  Radiological Study: Reviewed and Ordered  Other:   I have discussed this case with another health care provider.     11:28AM:  Patient's ultrasound negative for any acute findings.  Will plan for a CT.  Will continue to follow.    2:50 PM:  Patient's CT shows bilateral renal infarcts of some unknown embolic etiology.  Will plan to admit for further observation and management.  I discussed pt with Dr. Wetzel, who is agreeable to plan for admission under Dr. Penny.  Patient agreeable to plan and all questions answered.       Scribe Attestation:   Scribe #1: I performed the above scribed service and the documentation accurately describes the services I performed. I attest to the accuracy of the note.          Physician Attestation for Scribe: I, Peggy Reagan, reviewed documentation as scribed in my presence, which is both accurate and complete.           Clinical Impression:   Final diagnoses:  [N50.819] Testicular pain  [N28.0] Renal infarct (Primary)        ED Disposition Condition    Observation Stable                Peggy Reagan MD  02/09/23 1655

## 2023-02-09 NOTE — ED TRIAGE NOTES
Pt presents to the ED with c/o groin pain to the left flank area. Pt was seen at [facility] on Tuesday and has not gotten any relief. Pt denies any falls or trauma. Pt AAOx4, NAD noted.

## 2023-02-09 NOTE — Clinical Note
Diagnosis: Renal infarct [[id number]]   Future Attending Provider: VIOLETA PAREDES [[id number]]   Admitting Provider:: LUCIANO MORROW [[id number]]   Special Needs:: No Special Needs [1]

## 2023-02-09 NOTE — ED NOTES
Attempted to call report.  Sylvia garcia nurse not available.  Ask for charge nurse.  Placed on hold.

## 2023-02-10 ENCOUNTER — HOSPITAL ENCOUNTER (OUTPATIENT)
Dept: CARDIOLOGY | Facility: OTHER | Age: 63
Discharge: HOME | End: 2023-02-10
Payer: MEDICARE

## 2023-02-10 VITALS
HEART RATE: 76 BPM | WEIGHT: 215 LBS | SYSTOLIC BLOOD PRESSURE: 169 MMHG | BODY MASS INDEX: 27.59 KG/M2 | HEIGHT: 74 IN | DIASTOLIC BLOOD PRESSURE: 99 MMHG

## 2023-02-10 PROBLEM — E78.5 HYPERLIPIDEMIA: Status: ACTIVE | Noted: 2023-02-10

## 2023-02-10 LAB
ALBUMIN SERPL BCP-MCNC: 3.6 G/DL (ref 3.5–5.2)
ALP SERPL-CCNC: 73 U/L (ref 55–135)
ALT SERPL W/O P-5'-P-CCNC: 9 U/L (ref 10–44)
ANION GAP SERPL CALC-SCNC: 13 MMOL/L (ref 8–16)
AST SERPL-CCNC: 16 U/L (ref 10–40)
AV INDEX (PROSTH): 0.8
AV MEAN GRADIENT: 4 MMHG
AV PEAK GRADIENT: 7 MMHG
AV VALVE AREA: 3.36 CM2
AV VELOCITY RATIO: 0.87
BASOPHILS # BLD AUTO: 0.08 K/UL (ref 0–0.2)
BASOPHILS NFR BLD: 0.8 % (ref 0–1.9)
BILIRUB SERPL-MCNC: 0.5 MG/DL (ref 0.1–1)
BSA FOR ECHO PROCEDURE: 2.26 M2
BUN SERPL-MCNC: 13 MG/DL (ref 8–23)
CALCIUM SERPL-MCNC: 9.5 MG/DL (ref 8.7–10.5)
CHLORIDE SERPL-SCNC: 103 MMOL/L (ref 95–110)
CK SERPL-CCNC: 50 U/L (ref 20–200)
CO2 SERPL-SCNC: 19 MMOL/L (ref 23–29)
CREAT SERPL-MCNC: 0.8 MG/DL (ref 0.5–1.4)
CRP SERPL-MCNC: 22.3 MG/L (ref 0–8.2)
CV ECHO LV RWT: 0.37 CM
DIFFERENTIAL METHOD: ABNORMAL
DOP CALC AO PEAK VEL: 1.35 M/S
DOP CALC AO VTI: 27.4 CM
DOP CALC LVOT AREA: 4.2 CM2
DOP CALC LVOT DIAMETER: 2.31 CM
DOP CALC LVOT PEAK VEL: 1.17 M/S
DOP CALC LVOT STROKE VOLUME: 92.15 CM3
DOP CALCLVOT PEAK VEL VTI: 22 CM
E WAVE DECELERATION TIME: 163.01 MSEC
E/A RATIO: 0.63
E/E' RATIO: 13.78 M/S
ECHO LV POSTERIOR WALL: 0.93 CM (ref 0.6–1.1)
EJECTION FRACTION: 65 %
EOSINOPHIL # BLD AUTO: 0.3 K/UL (ref 0–0.5)
EOSINOPHIL NFR BLD: 2.6 % (ref 0–8)
ERYTHROCYTE [DISTWIDTH] IN BLOOD BY AUTOMATED COUNT: 14.9 % (ref 11.5–14.5)
ERYTHROCYTE [SEDIMENTATION RATE] IN BLOOD: 14 MM/HR (ref 0–10)
EST. GFR  (NO RACE VARIABLE): >60 ML/MIN/1.73 M^2
ESTIMATED AVG GLUCOSE: 117 MG/DL (ref 68–131)
FRACTIONAL SHORTENING: 46 % (ref 28–44)
GLUCOSE SERPL-MCNC: 97 MG/DL (ref 70–110)
HBA1C MFR BLD: 5.7 % (ref 4–5.6)
HCT VFR BLD AUTO: 47.1 % (ref 40–54)
HGB BLD-MCNC: 15.4 G/DL (ref 14–18)
IMM GRANULOCYTES # BLD AUTO: 0.03 K/UL (ref 0–0.04)
IMM GRANULOCYTES NFR BLD AUTO: 0.3 % (ref 0–0.5)
INTERVENTRICULAR SEPTUM: 0.91 CM (ref 0.6–1.1)
IVC DIAMETER: 18 CM
LA MAJOR: 5.44 CM
LA MINOR: 4.61 CM
LA WIDTH: 3.5 CM
LEFT ATRIUM SIZE: 3.12 CM
LEFT ATRIUM VOLUME INDEX MOD: 21.4 ML/M2
LEFT ATRIUM VOLUME INDEX: 20.7 ML/M2
LEFT ATRIUM VOLUME MOD: 48 CM3
LEFT ATRIUM VOLUME: 46.32 CM3
LEFT INTERNAL DIMENSION IN SYSTOLE: 2.77 CM (ref 2.1–4)
LEFT VENTRICLE DIASTOLIC VOLUME INDEX: 55.05 ML/M2
LEFT VENTRICLE DIASTOLIC VOLUME: 123.32 ML
LEFT VENTRICLE MASS INDEX: 75 G/M2
LEFT VENTRICLE SYSTOLIC VOLUME INDEX: 12.8 ML/M2
LEFT VENTRICLE SYSTOLIC VOLUME: 28.69 ML
LEFT VENTRICULAR INTERNAL DIMENSION IN DIASTOLE: 5.09 CM (ref 3.5–6)
LEFT VENTRICULAR MASS: 167.78 G
LV LATERAL E/E' RATIO: 15.5 M/S
LV SEPTAL E/E' RATIO: 12.4 M/S
LVOT MG: 2.89 MMHG
LVOT MV: 0.79 CM/S
LYMPHOCYTES # BLD AUTO: 1.4 K/UL (ref 1–4.8)
LYMPHOCYTES NFR BLD: 15 % (ref 18–48)
MCH RBC QN AUTO: 26.2 PG (ref 27–31)
MCHC RBC AUTO-ENTMCNC: 32.7 G/DL (ref 32–36)
MCV RBC AUTO: 80 FL (ref 82–98)
MONOCYTES # BLD AUTO: 1.3 K/UL (ref 0.3–1)
MONOCYTES NFR BLD: 13.8 % (ref 4–15)
MV PEAK A VEL: 0.99 M/S
MV PEAK E VEL: 0.62 M/S
MV STENOSIS PRESSURE HALF TIME: 41.27 MS
MV VALVE AREA P 1/2 METHOD: 5.33 CM2
NEUTROPHILS # BLD AUTO: 6.4 K/UL (ref 1.8–7.7)
NEUTROPHILS NFR BLD: 67.5 % (ref 38–73)
NRBC BLD-RTO: 0 /100 WBC
PISA TR MAX VEL: 2.53 M/S
PLATELET # BLD AUTO: 213 K/UL (ref 150–450)
PMV BLD AUTO: 10.2 FL (ref 9.2–12.9)
POCT GLUCOSE: 104 MG/DL (ref 70–110)
POCT GLUCOSE: 88 MG/DL (ref 70–110)
POCT GLUCOSE: 95 MG/DL (ref 70–110)
POTASSIUM SERPL-SCNC: 4 MMOL/L (ref 3.5–5.1)
PROT SERPL-MCNC: 7.4 G/DL (ref 6–8.4)
PULM VEIN S/D RATIO: 1.29
PV PEAK D VEL: 0.55 M/S
PV PEAK S VEL: 0.71 M/S
PV PEAK VELOCITY: 0.78 CM/S
RA MAJOR: 4.49 CM
RA PRESSURE: 3 MMHG
RA WIDTH: 3.8 CM
RBC # BLD AUTO: 5.87 M/UL (ref 4.6–6.2)
SODIUM SERPL-SCNC: 135 MMOL/L (ref 136–145)
TDI LATERAL: 0.04 M/S
TDI SEPTAL: 0.05 M/S
TDI: 0.05 M/S
TR MAX PG: 26 MMHG
TV REST PULMONARY ARTERY PRESSURE: 29 MMHG
WBC # BLD AUTO: 9.54 K/UL (ref 3.9–12.7)

## 2023-02-10 PROCEDURE — 99233 PR SUBSEQUENT HOSPITAL CARE,LEVL III: ICD-10-PCS | Mod: ,,, | Performed by: HOSPITALIST

## 2023-02-10 PROCEDURE — 99214 PR OFFICE/OUTPT VISIT, EST, LEVL IV, 30-39 MIN: ICD-10-PCS | Mod: 25,,, | Performed by: INTERNAL MEDICINE

## 2023-02-10 PROCEDURE — 85651 RBC SED RATE NONAUTOMATED: CPT | Performed by: NURSE PRACTITIONER

## 2023-02-10 PROCEDURE — 96372 THER/PROPH/DIAG INJ SC/IM: CPT | Performed by: NURSE PRACTITIONER

## 2023-02-10 PROCEDURE — 93306 ECHO (CUPID ONLY): ICD-10-PCS | Mod: 26,,, | Performed by: INTERNAL MEDICINE

## 2023-02-10 PROCEDURE — 96376 TX/PRO/DX INJ SAME DRUG ADON: CPT | Mod: 59

## 2023-02-10 PROCEDURE — G0378 HOSPITAL OBSERVATION PER HR: HCPCS

## 2023-02-10 PROCEDURE — 83036 HEMOGLOBIN GLYCOSYLATED A1C: CPT | Performed by: NURSE PRACTITIONER

## 2023-02-10 PROCEDURE — 93306 TTE W/DOPPLER COMPLETE: CPT

## 2023-02-10 PROCEDURE — 25500020 PHARM REV CODE 255: Performed by: HOSPITALIST

## 2023-02-10 PROCEDURE — 63600175 PHARM REV CODE 636 W HCPCS: Performed by: NURSE PRACTITIONER

## 2023-02-10 PROCEDURE — 99222 1ST HOSP IP/OBS MODERATE 55: CPT | Mod: ,,, | Performed by: UROLOGY

## 2023-02-10 PROCEDURE — 99222 PR INITIAL HOSPITAL CARE,LEVL II: ICD-10-PCS | Mod: ,,, | Performed by: UROLOGY

## 2023-02-10 PROCEDURE — 82550 ASSAY OF CK (CPK): CPT | Performed by: NURSE PRACTITIONER

## 2023-02-10 PROCEDURE — 25000003 PHARM REV CODE 250: Performed by: INTERNAL MEDICINE

## 2023-02-10 PROCEDURE — 80053 COMPREHEN METABOLIC PANEL: CPT | Performed by: NURSE PRACTITIONER

## 2023-02-10 PROCEDURE — 86140 C-REACTIVE PROTEIN: CPT | Performed by: NURSE PRACTITIONER

## 2023-02-10 PROCEDURE — 99233 SBSQ HOSP IP/OBS HIGH 50: CPT | Mod: ,,, | Performed by: HOSPITALIST

## 2023-02-10 PROCEDURE — 25000003 PHARM REV CODE 250: Performed by: NURSE PRACTITIONER

## 2023-02-10 PROCEDURE — 25000003 PHARM REV CODE 250: Performed by: HOSPITALIST

## 2023-02-10 PROCEDURE — 93306 TTE W/DOPPLER COMPLETE: CPT | Mod: 26,,, | Performed by: INTERNAL MEDICINE

## 2023-02-10 PROCEDURE — 85025 COMPLETE CBC W/AUTO DIFF WBC: CPT | Performed by: NURSE PRACTITIONER

## 2023-02-10 PROCEDURE — 99214 OFFICE O/P EST MOD 30 MIN: CPT | Mod: 25,,, | Performed by: INTERNAL MEDICINE

## 2023-02-10 PROCEDURE — 87040 BLOOD CULTURE FOR BACTERIA: CPT | Performed by: NURSE PRACTITIONER

## 2023-02-10 RX ORDER — CLOPIDOGREL 300 MG/1
300 TABLET, FILM COATED ORAL ONCE
Status: COMPLETED | OUTPATIENT
Start: 2023-02-11 | End: 2023-02-11

## 2023-02-10 RX ORDER — CARVEDILOL 12.5 MG/1
12.5 TABLET ORAL 2 TIMES DAILY WITH MEALS
Status: DISCONTINUED | OUTPATIENT
Start: 2023-02-10 | End: 2023-02-11 | Stop reason: HOSPADM

## 2023-02-10 RX ORDER — AMLODIPINE BESYLATE 5 MG/1
5 TABLET ORAL DAILY
Status: DISCONTINUED | OUTPATIENT
Start: 2023-02-10 | End: 2023-02-11

## 2023-02-10 RX ORDER — ENOXAPARIN SODIUM 100 MG/ML
40 INJECTION SUBCUTANEOUS EVERY 24 HOURS
Status: DISCONTINUED | OUTPATIENT
Start: 2023-02-11 | End: 2023-02-11 | Stop reason: HOSPADM

## 2023-02-10 RX ORDER — CLOPIDOGREL BISULFATE 75 MG/1
75 TABLET ORAL DAILY
Status: DISCONTINUED | OUTPATIENT
Start: 2023-02-11 | End: 2023-02-11 | Stop reason: HOSPADM

## 2023-02-10 RX ADMIN — HYDROCODONE BITARTRATE AND ACETAMINOPHEN 1 TABLET: 5; 325 TABLET ORAL at 07:02

## 2023-02-10 RX ADMIN — HYDROCODONE BITARTRATE AND ACETAMINOPHEN 1 TABLET: 5; 325 TABLET ORAL at 01:02

## 2023-02-10 RX ADMIN — ASPIRIN 81 MG CHEWABLE TABLET 81 MG: 81 TABLET CHEWABLE at 08:02

## 2023-02-10 RX ADMIN — EZETIMIBE 10 MG: 10 TABLET ORAL at 08:02

## 2023-02-10 RX ADMIN — AMLODIPINE BESYLATE 5 MG: 5 TABLET ORAL at 01:02

## 2023-02-10 RX ADMIN — CARVEDILOL 6.25 MG: 6.25 TABLET, FILM COATED ORAL at 07:02

## 2023-02-10 RX ADMIN — HYDROMORPHONE HYDROCHLORIDE 2 MG: 2 INJECTION, SOLUTION INTRAMUSCULAR; INTRAVENOUS; SUBCUTANEOUS at 06:02

## 2023-02-10 RX ADMIN — ENOXAPARIN SODIUM 100 MG: 100 INJECTION SUBCUTANEOUS at 06:02

## 2023-02-10 RX ADMIN — CARVEDILOL 12.5 MG: 12.5 TABLET, FILM COATED ORAL at 05:02

## 2023-02-10 RX ADMIN — IOHEXOL 100 ML: 350 INJECTION, SOLUTION INTRAVENOUS at 04:02

## 2023-02-10 NOTE — ASSESSMENT & PLAN NOTE
- Patient chronic lower back pain and previous history of spinal fusion  - Report pain is consistent with his baseline levels  - PRN analgesics

## 2023-02-10 NOTE — SUBJECTIVE & OBJECTIVE
Interval History:  No acute events, patient reports he is feeling much better.  No further left flank pain or when pain.  He still reports back pain, but it is consistent with his chronic back pain and not something new.  No reported chest pain or shortness of breath.    Review of Systems   Constitutional:  Negative for chills and fever.   Gastrointestinal:  Negative for abdominal pain.   Genitourinary:  Negative for decreased urine volume, difficulty urinating, dysuria, flank pain and hematuria.   Musculoskeletal:  Positive for back pain.   Objective:     Vital Signs (Most Recent):  Temp: 98.3 °F (36.8 °C) (02/10/23 1629)  Pulse: 74 (02/10/23 1629)  Resp: 18 (02/10/23 1629)  BP: (!) 178/106 (02/10/23 1629)  SpO2: (!) 94 % (02/10/23 1629)   Vital Signs (24h Range):  Temp:  [97.7 °F (36.5 °C)-99 °F (37.2 °C)] 98.3 °F (36.8 °C)  Pulse:  [71-85] 74  Resp:  [16-18] 18  SpO2:  [93 %-98 %] 94 %  BP: (155-184)/() 178/106     Weight: 97.5 kg (215 lb)  Body mass index is 27.6 kg/m².    Intake/Output Summary (Last 24 hours) at 2/10/2023 1643  Last data filed at 2/10/2023 1626  Gross per 24 hour   Intake 0 ml   Output 1675 ml   Net -1675 ml      Physical Exam  Vitals and nursing note reviewed.   Constitutional:       General: He is not in acute distress.     Appearance: Normal appearance. He is not ill-appearing, toxic-appearing or diaphoretic.   HENT:      Head: Normocephalic and atraumatic.      Nose: Nose normal.      Mouth/Throat:      Pharynx: No posterior oropharyngeal erythema.   Eyes:      Extraocular Movements: Extraocular movements intact.      Conjunctiva/sclera: Conjunctivae normal.   Cardiovascular:      Rate and Rhythm: Normal rate and regular rhythm.      Pulses: Normal pulses.      Heart sounds: Normal heart sounds. No murmur heard.  Pulmonary:      Effort: Pulmonary effort is normal. No respiratory distress.      Breath sounds: Normal breath sounds. No wheezing.   Abdominal:      General: Bowel sounds  are normal. There is no distension.      Palpations: Abdomen is soft.      Tenderness: There is no abdominal tenderness. There is no guarding or rebound.   Musculoskeletal:         General: No swelling, tenderness or signs of injury. Normal range of motion.      Cervical back: Normal range of motion and neck supple. No rigidity or tenderness.   Skin:     General: Skin is warm and dry.      Capillary Refill: Capillary refill takes less than 2 seconds.   Neurological:      General: No focal deficit present.      Mental Status: He is alert and oriented to person, place, and time.      Cranial Nerves: No cranial nerve deficit.      Sensory: No sensory deficit.      Motor: No weakness.   Psychiatric:         Mood and Affect: Mood normal.         Behavior: Behavior normal.         Thought Content: Thought content normal.       Significant Labs: All pertinent labs within the past 24 hours have been reviewed.    Significant Imaging: I have reviewed all pertinent imaging results/findings within the past 24 hours.

## 2023-02-10 NOTE — HOSPITAL COURSE
Mr. Noyola presented with left flank and groin pain.  Placed in observation status.  Workup in the ER with CT of the abdomen pelvis with contrast remarkable for bilateral renal infarcts concerning for embolic phenomenon.  D-dimer elevated 0.55.  Anticoagulated with full-dose Lovenox empirically.   Cardiology and Urology consulted. Ultrasound of lower extremities negative for DVT and CTA of the chest negative for PE.  Echo with normal systolic function, normal left ventricle size, grade 1 diastolic dysfunction and negative for shunting.  No ectopy noted on telemetry.  No clear evidence for clots despite suspicion for embolic phenomenon.  Given no thrombus found, cardiology recommended treatment with dual anti-platelet therapy but not full anticoagulation.  Urology had no recommendations given no hydronephrosis, masses or stones visualized and UA was unremarkable.  Flank pain resolved.  Patient discharged on aspirin and Plavix with follow-up with Cardiology.    * Renal infarct  Flank pain  - Patient with bilateral renal infarct found on CT abdomen pelvis which was concerning for embolic phenomenon   - Patient denies any family history of hypercoagulable states.  He has had previous elevated D-dimers in his chart.  Denies any blood clots or pulmonary emboli.  Patient denies any trauma to his torso.  Patient reports he has had episodes of shortness of breath for the past 6 months.  Patient denies IV drug use.  Patient denies a history of atrial fibrillation.  Patient in normal sinus rhythm on EKG.   - Low suspicion for infectious etiology that can result in possible septic emboli at this time.  No source of infection, he was afebrile and with a normal WBC.  Blood culture are NGTD.  - Ultrasound negative for DVT and ultrasound of his scrotum and testicles negative.   - Further workup with CTA of chest negative for PE   - Workup with Echo with bubble study negative for shunting, normal EF   - Continue to monitor on  telemetry to assess for ectopy  - Treated initially with full-dose Lovenox b.i.d.   - P.r.n. analgesics ordered for pain  - Cardiology and Urology consulted. No needs for intervention as per Urology.   - Cardiology recommended ASA and plavix given no thrombus found and full anticoagulation stopped  - Patient discharged on dual antiplatelet therapy and follow up with Cardiology     Hyperlipidemia  - Continued ezetimibe 10 mg p.o. daily     Diabetes mellitus, type 2  - Latest A1c recorded is 5.8 in 2019, and repeat hemoglobin A1c on this admission 5.7 consistent with good control  - At home patient takes metformin extended release 500 mg b.i.d. which will be held during hospitalization   - Glucose levels were reviewed, well controlled  - Continue low-dose sliding scale insulin while inpatient and adjust as needed to achieve glucose between 140-180 during hospital stay  - Resumed on home regimen on discharge     Essential hypertension  - Blood pressure still elevated  - Increased carvedilol 12.5 mg b.i.d..yesterday but still not controlled  - Added amlodipine 5 mg p.o. daily and then increased to 10 mg daily     DDD (degenerative disc disease), lumbosacral  - Patient chronic lower back pain and previous history of spinal fusion  - Report pain was consistent with his baseline levels  - PRN analgesics

## 2023-02-10 NOTE — SUBJECTIVE & OBJECTIVE
Past Medical History:   Diagnosis Date    Diabetes mellitus     Gout     Hepatitis C     Hypercholesteremia     Hypertension        Past Surgical History:   Procedure Laterality Date    ARTHROPLASTY, KNEE, TOTAL, SIGHT ASSISTED Left 7/26/2021    Procedure: ARTHROPLASTY, KNEE, TOTAL, SIGHT ASSISTED;  Surgeon: Jan Rust MD;  Location: [facility];  Service: Orthopedics;  Laterality: Left;    BACK SURGERY      CORONARY ANGIOGRAPHY  08/24/2016    no CAD    FRACTURE SURGERY Left     leg    FUSION OF SPINE WITH INSTRUMENTATION N/A 12/20/2018    Procedure: FUSION, SPINE, WITH INSTRUMENTATION Removal of spinal hardware, Bilateral Jamison-White Osteotomy L5-S1, L5-S1 Interbody Arthrodesis, L4-S1 Segmental Instrumentation with Posterior Lateral Arthrodesis;  Surgeon: Ezequiel Pedroza MD;  Location: [facility];  Service: Neurosurgery;  Laterality: N/A;  Procedure: Removal of spinal hardware, Revision of Posterolateral fusion from L3-S1, L5-S1 In    HIP SURGERY Right     hip fusion    INJECTION OF STEROID Right 3/22/2019    Procedure: INJECTION, STEROID Right SI Joint Block and Steroid Injection;  Surgeon: Ezequiel Pedroza MD;  Location: [facility];  Service: Neurosurgery;  Laterality: Right;  Procedure: Right SI Joint Block and Steroid Injection  Surgery Time: 15 Min  LOS:0  Anesthesia: General  Radiology: C-Arm  Bed: Regular Bed  Position: Prone    KNEE ARTHROSCOPY      LAMINECTOMY      Lumbar    MYELOGRAPHY N/A 11/5/2018    Procedure: Myelogram;  Surgeon: Mel Diagnostic Provider;  Location: [address];  Service: Radiology;  Laterality: N/A;    right wrist surgery      TRIAL OF SPINAL CORD NERVE STIMULATOR N/A 9/20/2019    Procedure: Trial, Neurostimulator, Spinal Cord Spinal Cord Stimulator Trial using 2 Percutaneous Least at T9-10;  Surgeon: Ezequiel Pedroza MD;  Location: [address];  Service: Neurosurgery;  Laterality: N/A;  Procedure: Spinal Cord Stimulator Trial using 2 Percutaneous Least at T9-10  Surgery Time:  "1 Hr  LOS: 0  Anesthesia: General  Radiology: C-arm  Bed: [place] [id number] Poster  Position: Prone  Eq       Review of patient's allergies indicates:   Allergen Reactions    Lactose     Gabapentin Other (See Comments)     Patient states, "it makes me bleed from my rectum. Don't give it to me."    Atorvastatin Rash     Dye used    Opioids - morphine analogues Rash    Penicillins Rash     Pt states a "a couple of years ago" he took penicillin and broke out in "red spots" with no itching or difficulty breathing       No current facility-administered medications on file prior to encounter.     Current Outpatient Medications on File Prior to Encounter   Medication Sig    aspirin 81 MG Chew Take 1 tablet (81 mg total) by mouth 2 (two) times daily. (Patient taking differently: Take 81 mg by mouth once daily.)    carvediloL (COREG) 6.25 MG tablet Take 6.25 mg by mouth 2 (two) times daily with meals.    ezetimibe (ZETIA) 10 mg tablet Take 10 mg by mouth once daily.    HYDROcodone-acetaminophen (NORCO)  mg per tablet Take 1 tablet by mouth after meals as needed.    meloxicam (MOBIC) 15 MG tablet Take 1 tablet (15 mg total) by mouth once daily.    metFORMIN (GLUCOPHAGE-XR) 500 MG ER 24hr tablet Take 500 mg by mouth.    tamsulosin (FLOMAX) 0.4 mg Cap Take 1 capsule by mouth.    orphenadrine (NORFLEX) 100 mg tablet Take 1 tablet (100 mg total) by mouth 2 (two) times daily. for 10 days    [DISCONTINUED] BD ALCOHOL SWABS PadM     [DISCONTINUED] hydroCHLOROthiazide (HYDRODIURIL) 25 MG tablet Take 1 tablet (25 mg total) by mouth once daily. (Patient not taking: Reported on 11/25/2022)    [DISCONTINUED] ibuprofen (ADVIL,MOTRIN) 800 MG tablet Take 1 tablet (800 mg total) by mouth every 6 (six) hours as needed for Pain.    [DISCONTINUED] metFORMIN (GLUCOPHAGE) 500 MG tablet Take 500 mg by mouth 2 (two) times daily.    [DISCONTINUED] triamcinolone acetonide 0.1% (KENALOG) 0.1 % ointment Apply topically 2 (two) times daily.    " [DISCONTINUED] TRUE METRIX GLUCOSE TEST STRIP Strp     [DISCONTINUED] TRUEPLUS LANCETS 33 gauge Misc      Family History       Problem Relation (Age of Onset)    Diabetes Sister, Brother    Hypertension Sister, Brother          Tobacco Use    Smoking status: Former     Passive exposure: Never    Smokeless tobacco: Never    Tobacco comments:     quit over 30yrs   Substance and Sexual Activity    Alcohol use: No    Drug use: Yes     Types: Marijuana     Comment: daily    Sexual activity: Not on file     Review of Systems   Constitutional: Negative.  Negative for activity change, appetite change, chills and fever.   HENT: Negative.  Negative for congestion, mouth sores and trouble swallowing.    Eyes: Negative.  Negative for photophobia, pain and visual disturbance.   Respiratory: Negative.  Negative for cough, choking, chest tightness and shortness of breath.    Cardiovascular: Negative.  Negative for chest pain, palpitations and leg swelling.   Gastrointestinal:  Positive for abdominal pain. Negative for abdominal distention, blood in stool, constipation, diarrhea, nausea and vomiting.   Endocrine: Negative.  Negative for polydipsia, polyphagia and polyuria.   Genitourinary:  Positive for flank pain and testicular pain. Negative for decreased urine volume, difficulty urinating, dysuria and enuresis.   Musculoskeletal:  Negative for arthralgias, back pain and gait problem.   Skin: Negative.  Negative for pallor and rash.   Allergic/Immunologic: Negative.  Negative for environmental allergies and food allergies.   Neurological: Negative.  Negative for dizziness, facial asymmetry, speech difficulty, weakness and headaches.   Hematological: Negative.         Patient reports that he clots quickly   Psychiatric/Behavioral: Negative.  Negative for agitation, behavioral problems and confusion.    Objective:     Vital Signs (Most Recent):  Temp: 98.3 °F (36.8 °C) (02/09/23 2012)  Pulse: 74 (02/09/23 2012)  Resp: 16 (02/09/23  2013)  BP: (!) 155/79 (02/09/23 2012)  SpO2: (!) 93 % (02/09/23 2012)   Vital Signs (24h Range):  Temp:  [98.2 °F (36.8 °C)-99.2 °F (37.3 °C)] 98.3 °F (36.8 °C)  Pulse:  [71-83] 74  Resp:  [16-20] 16  SpO2:  [93 %-100 %] 93 %  BP: (155-183)/() 155/79     Weight: 97.5 kg (215 lb)  Body mass index is 27.6 kg/m².    Physical Exam  Vitals reviewed.   Constitutional:       General: He is not in acute distress.     Appearance: Normal appearance.   HENT:      Head: Normocephalic and atraumatic.      Mouth/Throat:      Mouth: Mucous membranes are moist.      Pharynx: Oropharynx is clear. No posterior oropharyngeal erythema.   Eyes:      Extraocular Movements: Extraocular movements intact.      Pupils: Pupils are equal, round, and reactive to light.   Cardiovascular:      Rate and Rhythm: Normal rate and regular rhythm.      Pulses: Normal pulses.      Heart sounds: Normal heart sounds. No murmur heard.  Pulmonary:      Effort: Pulmonary effort is normal. No respiratory distress.      Breath sounds: Normal breath sounds. No wheezing.   Abdominal:      General: Bowel sounds are normal. There is no distension.      Palpations: Abdomen is soft.      Tenderness: There is no abdominal tenderness. There is no guarding.   Musculoskeletal:         General: No swelling, tenderness or signs of injury. Normal range of motion.      Cervical back: Normal range of motion and neck supple. No rigidity or tenderness.   Skin:     General: Skin is warm and dry.      Capillary Refill: Capillary refill takes less than 2 seconds.   Neurological:      General: No focal deficit present.      Mental Status: He is alert and oriented to person, place, and time.      Cranial Nerves: No cranial nerve deficit.      Sensory: No sensory deficit.      Motor: No weakness.      Comments: Decreased sensation to bilateral feet; increased sensitivity to tactile touch around his abdomen left flank and upper thighs   Psychiatric:         Mood and Affect:  Mood normal.         Behavior: Behavior normal.         CRANIAL NERVES     CN III, IV, VI   Pupils are equal, round, and reactive to light.     Significant Labs: All pertinent labs within the past 24 hours have been reviewed.  BMP:   Recent Labs   Lab 02/09/23  1004   GLU 90      K 4.1      CO2 23   BUN 11   CREATININE 0.9   CALCIUM 9.5     CBC:   Recent Labs   Lab 02/09/23  0904   WBC 10.63   HGB 15.0   HCT 44.9        CMP:   Recent Labs   Lab 02/09/23  1004      K 4.1      CO2 23   GLU 90   BUN 11   CREATININE 0.9   CALCIUM 9.5   PROT 7.8   ALBUMIN 3.7   BILITOT 0.5   ALKPHOS 71   AST 18   ALT 12   ANIONGAP 10     Lactic Acid:   Recent Labs   Lab 02/09/23  1729   LACTATE 1.1     POCT Glucose:   Recent Labs   Lab 02/09/23  1646 02/09/23  1956   POCTGLUCOSE 87 124*     Urine Culture: No results for input(s): LABURIN in the last 48 hours.  Urine Studies:   Recent Labs   Lab 02/09/23  0833   COLORU Yellow   APPEARANCEUA Clear   PHUR 7.0   SPECGRAV 1.020   PROTEINUA Trace*   GLUCUA Negative   KETONESU 3+*   BILIRUBINUA Negative   OCCULTUA Negative   NITRITE Negative   UROBILINOGEN Negative   LEUKOCYTESUR Negative       Significant Imaging: I have reviewed all pertinent imaging results/findings within the past 24 hours.  US Lower Extremity Veins Bilateral  Narrative: EXAMINATION:  US LOWER EXTREMITY VEINS BILATERAL    CLINICAL HISTORY:  r/o dvt;    TECHNIQUE:  Duplex and color flow Doppler evaluation of the bilateral lower extremity veins was performed.    COMPARISON:  August 2021.    FINDINGS:  No evidence of clot involving the bilateral common femoral, greater saphenous, femoral, popliteal, peroneal, anterior and posterior tibial veins.  All venous structures demonstrate normal respiratory phasicity and augment adequately.  No evidence of soft tissue mass or Baker's cyst.  Impression: No evidence of lower extremity deep venous thrombosis.    Electronically signed by: Ronna Echeverria  MD  Date:    02/09/2023  Time:    19:35  CT Abdomen Pelvis With Contrast  Narrative: EXAMINATION:  CT ABDOMEN PELVIS WITH CONTRAST    CLINICAL HISTORY:  Abdominal pain, acute, nonlocalized;    TECHNIQUE:  Low dose axial images, sagittal and coronal reformations were obtained from the lung bases to the pubic symphysis following the IV administration of 100 mL of Omnipaque 350 .  Oral contrast was not given.    COMPARISON:  Scrotal ultrasound same day, CT abdomen and pelvis 07/01/2021, right upper quadrant ultrasound 09/28/2020, renal ultrasound 09/27/2016    FINDINGS:  Imaged lung bases show scattered areas of platelike scarring versus atelectasis and minimal dependent atelectasis without consolidation or pleural effusion.  Base of the heart is normal in size without significant pericardial fluid.    Liver, gallbladder, pancreas, spleen, stomach, duodenum and bilateral adrenal glands are within normal limits.  No biliary ductal dilatation.    Bilateral kidneys are normal in size, shape and location.  Multifocal subcentimeter hypodense cortical foci noted throughout each kidney, some of which appear wedge-shaped, and all are new from 07/01/2021 study.  No hydronephrosis or significant perinephric stranding.  Ureters are nondilated.  Urinary bladder well distended without wall thickening.  Prostate and seminal vesicles are within normal limits.    Appendix and terminal ileum are within normal limits.  Moderate amount of stool noted throughout the colon.  Numerous scattered colonic diverticula without convincing evidence of acute diverticulitis.  No evidence of bowel obstruction or acute inflammation.  No pneumatosis or portal venous gas.    Scattered subcentimeter lymph nodes throughout the abdomen and pelvis and at the bilateral inguinal regions similar to prior, none pathologically enlarged by CT criteria.  No ascites or free air.  No significant atherosclerosis.  No aortic aneurysm or dissection.    Small fat  containing umbilical hernia.    There is redemonstrated osseous fusion of the right hip and also lumbar spine, with hardware present at the lumbar spine, right hip and proximal femur and associated beam hardening with streak artifact.  Osseous structures appear grossly stable without acute or destructive process seen.  Impression: 1. Bilateral renal new multifocal subcentimeter hypoattenuating cortical foci, some of which demonstrate wedge-shaped configuration concerning for multifocal infarcts potentially from embolic etiology.  Sequela of pyelonephritis not excluded.  Clinical correlation and with urinalysis advised.  No hydronephrosis.  2. Diverticulosis coli without diverticulitis.  3. Grossly stable additional findings as above.  This report was flagged in Epic as abnormal.    Electronically signed by: Adarsh Baires MD  Date:    02/09/2023  Time:    14:20  US Scrotum And Testicles  Narrative: EXAMINATION:  US SCROTUM AND TESTICLES    CLINICAL HISTORY:  Testicular pain, unspecified    TECHNIQUE:  Sonography of the scrotum and testes.    COMPARISON:  None.    FINDINGS:  Right Testicle:    *Size: 4.8 x 3 x 3.3 cm  *Appearance: Normal.  *Flow: Normal arterial and venous flow  *Epididymis: Normal.  *Hydrocele: None.  *Varicocele: None.  .    Left Testicle:    *Size: 4.2 x 3.1 x 3 cm  *Appearance: Normal.  *Flow: Normal arterial and venous flow  *Epididymis: Normal.  *Hydrocele: None.  *Varicocele: None.  .    Other findings: None.  Impression: No significant abnormality.    Electronically signed by: Ashley Montes  Date:    02/09/2023  Time:    11:14

## 2023-02-10 NOTE — HPI
Mr. Falcon is a 62-year-old male with a past medical history that includes hypertension, diabetes mellitus type 2, untreated hepatitis-C.  He also has had a lumbar fusion in the past and has chronic back pain along with neuropathy of his bilateral feet.  Patient came to the emergency department today for pain that began 3 days ago in his lower abdomen and has begun to radiate towards his right groin and around his left flank.  Patient states he was in the middle of doing his laundry and had an acute onset of this pain.  He went to the emergency department at [facility] and they sent him home on ibuprofen.  Patient denies any trauma or dysuria.  The pain is worse with tactile touch.  Patient denies chest pain and shortness of breath.  He is prescribed Norco at home for his back pain.  This medication did not help control his new flank, abdominal, scrotal pain.  In the emergency department patient had a temperature of 99.2°, white blood cell count of 10.63, creatinine of 0.9, and a GFR greater than 60.  He had an ultrasound of his scrotum and testicles which did not identify any significant abnormalities.  Patient had a CT was abdomen pelvis with contrast the reports new bilateral hypo attenuations that appear wedge-shaped configuration in could indicate emboli.  There is no hydronephrosis.  Patient admitted to hospital medicine for further management.

## 2023-02-10 NOTE — ASSESSMENT & PLAN NOTE
Latest A1c recorded is 5.8 in 2019.  At home patient takes metformin extended release 500 mg b.i.d..  Will start low-dose sliding scale insulin while inpatient and adjust as needed.

## 2023-02-10 NOTE — SUBJECTIVE & OBJECTIVE
Past Medical History:   Diagnosis Date    Diabetes mellitus     Gout     Hepatitis C     Hypercholesteremia     Hypertension        Past Surgical History:   Procedure Laterality Date    ARTHROPLASTY, KNEE, TOTAL, SIGHT ASSISTED Left 7/26/2021    Procedure: ARTHROPLASTY, KNEE, TOTAL, SIGHT ASSISTED;  Surgeon: Jan Rust MD;  Location: [facility];  Service: Orthopedics;  Laterality: Left;    BACK SURGERY      CORONARY ANGIOGRAPHY  08/24/2016    no CAD    FRACTURE SURGERY Left     leg    FUSION OF SPINE WITH INSTRUMENTATION N/A 12/20/2018    Procedure: FUSION, SPINE, WITH INSTRUMENTATION Removal of spinal hardware, Bilateral Jamison-White Osteotomy L5-S1, L5-S1 Interbody Arthrodesis, L4-S1 Segmental Instrumentation with Posterior Lateral Arthrodesis;  Surgeon: Ezequiel Pedroza MD;  Location: [facility];  Service: Neurosurgery;  Laterality: N/A;  Procedure: Removal of spinal hardware, Revision of Posterolateral fusion from L3-S1, L5-S1 In    HIP SURGERY Right     hip fusion    INJECTION OF STEROID Right 3/22/2019    Procedure: INJECTION, STEROID Right SI Joint Block and Steroid Injection;  Surgeon: Ezequiel Pedroza MD;  Location: [facility];  Service: Neurosurgery;  Laterality: Right;  Procedure: Right SI Joint Block and Steroid Injection  Surgery Time: 15 Min  LOS:0  Anesthesia: General  Radiology: C-Arm  Bed: Regular Bed  Position: Prone    KNEE ARTHROSCOPY      LAMINECTOMY      Lumbar    MYELOGRAPHY N/A 11/5/2018    Procedure: Myelogram;  Surgeon: Mel Diagnostic Provider;  Location: [address];  Service: Radiology;  Laterality: N/A;    right wrist surgery      TRIAL OF SPINAL CORD NERVE STIMULATOR N/A 9/20/2019    Procedure: Trial, Neurostimulator, Spinal Cord Spinal Cord Stimulator Trial using 2 Percutaneous Least at T9-10;  Surgeon: Ezequiel Pedroza MD;  Location: [address];  Service: Neurosurgery;  Laterality: N/A;  Procedure: Spinal Cord Stimulator Trial using 2 Percutaneous Least at T9-10  Surgery Time:  "1 Hr  LOS: 0  Anesthesia: General  Radiology: C-arm  Bed: [place] [id number] Poster  Position: Prone  Eq       Review of patient's allergies indicates:   Allergen Reactions    Lactose     Gabapentin Other (See Comments)     Patient states, "it makes me bleed from my rectum. Don't give it to me."    Atorvastatin Rash     Dye used    Opioids - morphine analogues Rash    Penicillins Rash     Pt states a "a couple of years ago" he took penicillin and broke out in "red spots" with no itching or difficulty breathing       Family History       Problem Relation (Age of Onset)    Diabetes Sister, Brother    Hypertension Sister, Brother            Tobacco Use    Smoking status: Former     Passive exposure: Never    Smokeless tobacco: Never    Tobacco comments:     quit over 30yrs   Substance and Sexual Activity    Alcohol use: No    Drug use: Yes     Types: Marijuana     Comment: daily    Sexual activity: Not on file       Review of Systems   Constitutional:  Negative for activity change, fatigue, fever and unexpected weight change.   HENT:  Negative for sore throat and trouble swallowing.    Eyes:  Negative for pain and discharge.   Respiratory:  Negative for chest tightness and shortness of breath.    Cardiovascular:  Negative for chest pain and palpitations.   Gastrointestinal:  Positive for abdominal pain. Negative for constipation, diarrhea, nausea and vomiting.   Genitourinary:  Positive for flank pain. Negative for decreased urine volume, difficulty urinating, dysuria and frequency.        As per HPI   Musculoskeletal:  Negative for back pain and gait problem.   Skin:  Negative for rash and wound.   Neurological:  Negative for seizures and numbness.   Psychiatric/Behavioral:  Negative for hallucinations. The patient is not nervous/anxious.      Objective:     Temp:  [97.7 °F (36.5 °C)-99.2 °F (37.3 °C)] 98.1 °F (36.7 °C)  Pulse:  [71-85] 75  Resp:  [16-20] 16  SpO2:  [93 %-98 %] 96 %  BP: (155-184)/() 184/99     Body mass " index is 27.6 kg/m².           Drains       None                   Physical Exam  Vitals and nursing note reviewed.   Constitutional:       Appearance: Normal appearance.   HENT:      Head: Atraumatic.      Nose: Nose normal.   Eyes:      Extraocular Movements: Extraocular movements intact.      Pupils: Pupils are equal, round, and reactive to light.   Cardiovascular:      Rate and Rhythm: Normal rate.   Pulmonary:      Effort: Pulmonary effort is normal.   Abdominal:      General: Abdomen is flat. There is no distension.      Tenderness: There is no abdominal tenderness. There is no right CVA tenderness or left CVA tenderness.   Musculoskeletal:         General: Normal range of motion.      Cervical back: Normal range of motion.   Skin:     Coloration: Skin is not jaundiced.   Neurological:      General: No focal deficit present.      Mental Status: He is alert and oriented to person, place, and time.   Psychiatric:         Mood and Affect: Mood normal.         Behavior: Behavior normal.       Significant Labs:    BMP:  Recent Labs   Lab 02/09/23  1004 02/10/23  0444    135*   K 4.1 4.0    103   CO2 23 19*   BUN 11 13   CREATININE 0.9 0.8   CALCIUM 9.5 9.5       CBC:  Recent Labs   Lab 02/09/23  0904 02/10/23  0444   WBC 10.63 9.54   HGB 15.0 15.4   HCT 44.9 47.1    213       All pertinent labs results from the past 24 hours have been reviewed.    Significant Imaging:  All pertinent imaging results/findings from the past 24 hours have been reviewed.

## 2023-02-10 NOTE — CONSULTS
[facility] Surg ([address])  Urology  Consult Note    Patient Name: Ezekiel Noyola  MRN: [id number]  Admission Date: 2/9/2023  Hospital Length of Stay: 0   Code Status: Full Code   Attending Provider: Renetta Penny MD   Consulting Provider: Jsoe Bella MD  Primary Care Physician: Dionisio Avila MD  Principal Problem:Renal infarct    Inpatient consult to Urology  Consult performed by: Jose Bella MD  Consult ordered by: Renetta Penny MD          Subjective:     HPI:  Mr. Noyola is a 62-year-old male with a past medical history that includes hypertension, diabetes mellitus type 2, untreated hepatitis-C.  He also has had a lumbar fusion in the past and has chronic back pain along with neuropathy of his bilateral feet.  Patient came to the emergency department for b/l flank pain. CT imaging found him to have b/l wedge-shaped renal infarcts which are new from 2021. He denies recent hematuria, dysuria, recent nephrolithiaisis and prior  truama. At bedside, he is AFVSS, WBC 9.5, Cr 0.8, UA not concerning for infection.       Past Medical History:   Diagnosis Date    Diabetes mellitus     Gout     Hepatitis C     Hypercholesteremia     Hypertension        Past Surgical History:   Procedure Laterality Date    ARTHROPLASTY, KNEE, TOTAL, SIGHT ASSISTED Left 7/26/2021    Procedure: ARTHROPLASTY, KNEE, TOTAL, SIGHT ASSISTED;  Surgeon: Jan Rust MD;  Location: [facility] OR;  Service: Orthopedics;  Laterality: Left;    BACK SURGERY      CORONARY ANGIOGRAPHY  08/24/2016    no CAD    FRACTURE SURGERY Left     leg    FUSION OF SPINE WITH INSTRUMENTATION N/A 12/20/2018    Procedure: FUSION, SPINE, WITH INSTRUMENTATION Removal of spinal hardware, Bilateral Jamison-White Osteotomy L5-S1, L5-S1 Interbody Arthrodesis, L4-S1 Segmental Instrumentation with Posterior Lateral Arthrodesis;  Surgeon: Ezequiel Pedroza MD;  Location: [facility] OR;  Service: Neurosurgery;  Laterality: N/A;  Procedure: Removal of spinal hardware,  "Revision of Posterolateral fusion from L3-S1, L5-S1 In    HIP SURGERY Right     hip fusion    INJECTION OF STEROID Right 3/22/2019    Procedure: INJECTION, STEROID Right SI Joint Block and Steroid Injection;  Surgeon: Ezequiel Pedroza MD;  Location: [facility];  Service: Neurosurgery;  Laterality: Right;  Procedure: Right SI Joint Block and Steroid Injection  Surgery Time: 15 Min  LOS:0  Anesthesia: General  Radiology: C-Arm  Bed: Regular Bed  Position: Prone    KNEE ARTHROSCOPY      LAMINECTOMY      Lumbar    MYELOGRAPHY N/A 11/5/2018    Procedure: Myelogram;  Surgeon: Mel Diagnostic Provider;  Location: [address];  Service: Radiology;  Laterality: N/A;    right wrist surgery      TRIAL OF SPINAL CORD NERVE STIMULATOR N/A 9/20/2019    Procedure: Trial, Neurostimulator, Spinal Cord Spinal Cord Stimulator Trial using 2 Percutaneous Least at T9-10;  Surgeon: Ezequiel Pedroza MD;  Location: [address];  Service: Neurosurgery;  Laterality: N/A;  Procedure: Spinal Cord Stimulator Trial using 2 Percutaneous Least at T9-10  Surgery Time: 1 Hr  LOS: 0  Anesthesia: General  Radiology: C-arm  Bed: [place] 4 Poster  Position: Prone  Eq       Review of patient's allergies indicates:   Allergen Reactions    Lactose     Gabapentin Other (See Comments)     Patient states, "it makes me bleed from my rectum. Don't give it to me."    Atorvastatin Rash     Dye used    Opioids - morphine analogues Rash    Penicillins Rash     Pt states a "a couple of years ago" he took penicillin and broke out in "red spots" with no itching or difficulty breathing       Family History       Problem Relation (Age of Onset)    Diabetes Sister, Brother    Hypertension Sister, Brother            Tobacco Use    Smoking status: Former     Passive exposure: Never    Smokeless tobacco: Never    Tobacco comments:     quit over 30yrs   Substance and Sexual Activity    Alcohol use: No    Drug use: Yes     Types: Marijuana     Comment: " daily    Sexual activity: Not on file       Review of Systems   Constitutional:  Negative for activity change, fatigue, fever and unexpected weight change.   HENT:  Negative for sore throat and trouble swallowing.    Eyes:  Negative for pain and discharge.   Respiratory:  Negative for chest tightness and shortness of breath.    Cardiovascular:  Negative for chest pain and palpitations.   Gastrointestinal:  Positive for abdominal pain. Negative for constipation, diarrhea, nausea and vomiting.   Genitourinary:  Positive for flank pain. Negative for decreased urine volume, difficulty urinating, dysuria and frequency.        As per HPI   Musculoskeletal:  Negative for back pain and gait problem.   Skin:  Negative for rash and wound.   Neurological:  Negative for seizures and numbness.   Psychiatric/Behavioral:  Negative for hallucinations. The patient is not nervous/anxious.      Objective:     Temp:  [97.7 °F (36.5 °C)-99.2 °F (37.3 °C)] 98.1 °F (36.7 °C)  Pulse:  [71-85] 75  Resp:  [16-20] 16  SpO2:  [93 %-98 %] 96 %  BP: (155-184)/() 184/99     Body mass index is 27.6 kg/m².           Drains       None                   Physical Exam  Vitals and nursing note reviewed.   Constitutional:       Appearance: Normal appearance.   HENT:      Head: Atraumatic.      Nose: Nose normal.   Eyes:      Extraocular Movements: Extraocular movements intact.      Pupils: Pupils are equal, round, and reactive to light.   Cardiovascular:      Rate and Rhythm: Normal rate.   Pulmonary:      Effort: Pulmonary effort is normal.   Abdominal:      General: Abdomen is flat. There is no distension.      Tenderness: There is no abdominal tenderness. There is no right CVA tenderness or left CVA tenderness.   Musculoskeletal:         General: Normal range of motion.      Cervical back: Normal range of motion.   Skin:     Coloration: Skin is not jaundiced.   Neurological:      General: No focal deficit present.      Mental Status: He is  alert and oriented to person, place, and time.   Psychiatric:         Mood and Affect: Mood normal.         Behavior: Behavior normal.       Significant Labs:    BMP:  Recent Labs   Lab 02/09/23  1004 02/10/23  0444    135*   K 4.1 4.0    103   CO2 23 19*   BUN 11 13   CREATININE 0.9 0.8   CALCIUM 9.5 9.5       CBC:  Recent Labs   Lab 02/09/23  0904 02/10/23  0444   WBC 10.63 9.54   HGB 15.0 15.4   HCT 44.9 47.1    213       All pertinent labs results from the past 24 hours have been reviewed.    Significant Imaging:  All pertinent imaging results/findings from the past 24 hours have been reviewed.                      Assessment and Plan:     * Renal infarct  Ezekiel Noyola is a 62 y.o. male who is found to have new b/l wedge-shaped renal infarcts.     - Consult d/w Staff Urologist  - Strict I's/O's  - Trend Cr, avoid nephrotoxic agents, and dose medications to patient's current GFR  - No plans for urologic interventions   - Recommend cardiology consult and to f/u echo results   - Patient without difficulty urinating and with baseline Cr   - All remaining care per primary team  - Please reach out to urology should any questions or concerns arise         VTE Risk Mitigation (From admission, onward)         Ordered     enoxaparin injection 100 mg  Every 12 hours (non-standard times)         02/09/23 1752     IP VTE LOW RISK PATIENT  Once         02/09/23 1544     Place sequential compression device  Until discontinued         02/09/23 1544                Thank you for your consult.    Jose Bella MD  Urology  [facility] ([address])

## 2023-02-10 NOTE — ASSESSMENT & PLAN NOTE
- Latest A1c recorded is 5.8 in 2019, and repeat hemoglobin A1c on this admission 5.7 consistent with good control  - At home patient takes metformin extended release 500 mg b.i.d. which will be held during hospitalization   - Glucose levels were reviewed, well controlled  - Continue low-dose sliding scale insulin while inpatient and adjust as needed to achieve glucose between 140-180

## 2023-02-10 NOTE — HPI
Mr. Noyola is a 62-year-old male with a past medical history that includes hypertension, diabetes mellitus type 2, untreated hepatitis-C.  He also has had a lumbar fusion in the past and has chronic back pain along with neuropathy of his bilateral feet.  Patient came to the emergency department for b/l flank pain. CT imaging found him to have b/l wedge-shaped renal infarcts which are new from 2021. He denies recent hematuria, dysuria, recent nephrolithiaisis and prior  truama. At bedside, he is AFVSS, WBC 9.5, Cr 0.8, UA not concerning for infection.

## 2023-02-10 NOTE — CONSULTS
[facility]    Date of admission:  2/9/2023     Reason for Consult:    Renal infarct    HPI:    This 62-year-old male is known to me from previous office evaluations.  He developed sudden onset of left flank pain.  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  We have been asked to evaluate him for a possible cardioembolic etiology.  He denies palpitations.  No prior symptoms of stroke or TIA.  No exertional dyspnea or chest discomfort.  No fever or chills.    Medications  Current Facility-Administered Medications   Medication Dose Route Frequency Provider Last Rate Last Admin    acetaminophen tablet 650 mg  650 mg Oral Q4H PRN Jane Santos DNP        amLODIPine tablet 5 mg  5 mg Oral Daily Renetta Penny MD   5 mg at 02/10/23 1333    aspirin chewable tablet 81 mg  81 mg Oral Daily Jane Santos DNP   81 mg at 02/10/23 0819    carvediloL tablet 6.25 mg  6.25 mg Oral BID WM Jane Santos DNP   6.25 mg at 02/10/23 0727    dextrose 10% bolus 125 mL 125 mL  12.5 g Intravenous PRN Renetta Penny MD        dextrose 10% bolus 250 mL 250 mL  25 g Intravenous PRN Renetta Penny MD        enoxaparin injection 100 mg  1 mg/kg Subcutaneous Q12H Jane Santos DNP   100 mg at 02/10/23 0626    ezetimibe tablet 10 mg  10 mg Oral Daily Jane Santos DNP   10 mg at 02/10/23 0818    glucagon (human recombinant) injection 1 mg  1 mg Intramuscular PRN Jane Santos DNP        glucose chewable tablet 16 g  16 g Oral PRN Jane Santos DNP        glucose chewable tablet 24 g  24 g Oral PRN Jane Santos DNP        HYDROcodone-acetaminophen 5-325 mg per tablet 1 tablet  1 tablet Oral Q6H PRN Jane Santos DNP   1 tablet at 02/10/23 1335    HYDROmorphone (PF) injection 2 mg  2 mg Intravenous Q4H PRN Jane Santos DNP   2 mg at 02/10/23 0627    insulin aspart U-100 pen 0-5 Units  0-5 Units Subcutaneous QID (AC + HS) PRN Jane Santos DNP         melatonin tablet 6 mg  6 mg Oral Nightly PRN Peggy Reagan MD        naloxone 0.4 mg/mL injection 0.02 mg  0.02 mg Intravenous PRN Jane Santos DNP        ondansetron injection 4 mg  4 mg Intravenous Q6H PRN Jane Santos DNP        sodium chloride 0.9% flush 10 mL  10 mL Intravenous PRN Peggy Reagan MD   10 mL at 02/09/23 1555      Prior to Admission medications    Medication Sig Start Date End Date Taking? Authorizing Provider   aspirin 81 MG Chew Take 1 tablet (81 mg total) by mouth 2 (two) times daily.  Patient taking differently: Take 81 mg by mouth once daily. 7/27/21  Yes Jan Rust MD   carvediloL (COREG) 6.25 MG tablet Take 6.25 mg by mouth 2 (two) times daily with meals.   Yes Historical Provider   ezetimibe (ZETIA) 10 mg tablet Take 10 mg by mouth once daily. 5/7/22  Yes Historical Provider   HYDROcodone-acetaminophen (NORCO)  mg per tablet Take 1 tablet by mouth after meals as needed. 6/9/22  Yes Historical Provider   meloxicam (MOBIC) 15 MG tablet Take 1 tablet (15 mg total) by mouth once daily. 8/26/21  Yes Dayne Graf MD   metFORMIN (GLUCOPHAGE-XR) 500 MG ER 24hr tablet Take 500 mg by mouth. 1/9/23  Yes Historical Provider   tamsulosin (FLOMAX) 0.4 mg Cap Take 1 capsule by mouth. 2/6/23  Yes Historical Provider   orphenadrine (NORFLEX) 100 mg tablet Take 1 tablet (100 mg total) by mouth 2 (two) times daily. for 10 days 2/7/23 2/17/23  Nohelia Vogel NP       History  Past Medical History:   Diagnosis Date    Diabetes mellitus     Gout     Hepatitis C     Hypercholesteremia     Hypertension      Past Surgical History:   Procedure Laterality Date    ARTHROPLASTY, KNEE, TOTAL, SIGHT ASSISTED Left 7/26/2021    Procedure: ARTHROPLASTY, KNEE, TOTAL, SIGHT ASSISTED;  Surgeon: Jan Rust MD;  Location: [facility];  Service: Orthopedics;  Laterality: Left;    BACK SURGERY      CORONARY ANGIOGRAPHY  08/24/2016    no CAD    FRACTURE SURGERY Left     leg    FUSION  OF SPINE WITH INSTRUMENTATION N/A 12/20/2018    Procedure: FUSION, SPINE, WITH INSTRUMENTATION Removal of spinal hardware, Bilateral Jamison-White Osteotomy L5-S1, L5-S1 Interbody Arthrodesis, L4-S1 Segmental Instrumentation with Posterior Lateral Arthrodesis;  Surgeon: Ezequiel Pedroza MD;  Location: [facility];  Service: Neurosurgery;  Laterality: N/A;  Procedure: Removal of spinal hardware, Revision of Posterolateral fusion from L3-S1, L5-S1 In    HIP SURGERY Right     hip fusion    INJECTION OF STEROID Right 3/22/2019    Procedure: INJECTION, STEROID Right SI Joint Block and Steroid Injection;  Surgeon: Ezequiel Pedroza MD;  Location[facility] OR;  Service: Neurosurgery;  Laterality: Right;  Procedure: Right SI Joint Block and Steroid Injection  Surgery Time: 15 Min  LOS:0  Anesthesia: General  Radiology: C-Arm  Bed: Regular Bed  Position: Prone    KNEE ARTHROSCOPY      LAMINECTOMY      Lumbar    MYELOGRAPHY N/A 11/5/2018    Procedure: Myelogram;  Surgeon: [facility] Provider;  Location: [address];  Service: Radiology;  Laterality: N/A;    right wrist surgery      TRIAL OF SPINAL CORD NERVE STIMULATOR N/A 9/20/2019    Procedure: Trial, Neurostimulator, Spinal Cord Spinal Cord Stimulator Trial using 2 Percutaneous Least at T9-10;  Surgeon: Ezequiel Pedroza MD;  Location: [facility] OR [address] [place] [address];  Service: Neurosurgery;  Laterality: N/A;  Procedure: Spinal Cord Stimulator Trial using 2 Percutaneous Least at T9-10  Surgery Time: 1 Hr  LOS: 0  Anesthesia: General  Radiology: C-arm  Bed: [place] 4 Poster  Position: Prone  Eq     Social History     Socioeconomic History    Marital status:    Tobacco Use    Smoking status: Former     Passive exposure: Never    Smokeless tobacco: Never    Tobacco comments:     quit over 30yrs   Substance and Sexual Activity    Alcohol use: No    Drug use: Yes     Types: Marijuana     Comment: daily     Social Determinants of Health     Financial Resource Strain: Low Risk      "Difficulty of Paying Living Expenses: Not hard at all   Food Insecurity: No Food Insecurity    Worried About Running Out of Food in the Last Year: Never true    Ran Out of Food in the Last Year: Never true   Transportation Needs: No Transportation Needs    Lack of Transportation (Medical): No    Lack of Transportation (Non-Medical): No   Physical Activity: Inactive    Days of Exercise per Week: 0 days    Minutes of Exercise per Session: 0 min   Stress: No Stress Concern Present    Feeling of Stress : Not at all   Social Connections: Socially Isolated    Frequency of Communication with Friends and Family: More than three times a week    Frequency of Social Gatherings with Friends and Family: Three times a week    Attends Baptism Services: Never    Active Member of Clubs or Organizations: No    Attends Club or Organization Meetings: Never    Marital Status:    Housing Stability: Unknown    Unable to Pay for Housing in the Last Year: No    Unstable Housing in the Last Year: No     Family History   Problem Relation Age of Onset    Diabetes Sister     Hypertension Sister     Diabetes Brother     Hypertension Brother     Melanoma Neg Hx     Psoriasis Neg Hx     Lupus Neg Hx         Allergies  Review of patient's allergies indicates:   Allergen Reactions    Lactose     Gabapentin Other (See Comments)     Patient states, "it makes me bleed from my rectum. Don't give it to me."    Atorvastatin Rash     Dye used    Opioids - morphine analogues Rash    Penicillins Rash     Pt states a "a couple of years ago" he took penicillin and broke out in "red spots" with no itching or difficulty breathing       Review of Systems   Review of Systems   Constitutional: Negative for malaise/fatigue, weight gain and weight loss.   Eyes:  Negative for visual disturbance.   Cardiovascular:  Negative for chest pain, claudication, cyanosis, dyspnea on exertion, irregular heartbeat, leg swelling, near-syncope, orthopnea, palpitations, " paroxysmal nocturnal dyspnea and syncope.   Respiratory:  Negative for cough, hemoptysis, shortness of breath, sleep disturbances due to breathing and wheezing.    Hematologic/Lymphatic: Negative for bleeding problem. Does not bruise/bleed easily.   Skin:  Negative for poor wound healing.   Musculoskeletal:  Negative for muscle cramps and myalgias.   Gastrointestinal:  Negative for abdominal pain, anorexia, diarrhea, heartburn, hematemesis, hematochezia, melena, nausea and vomiting.   Genitourinary:  Positive for flank pain. Negative for hematuria and nocturia.   Neurological:  Negative for excessive daytime sleepiness, dizziness, focal weakness, light-headedness and weakness.     Physical Exam    Temp:  [98.1 °F (36.7 °C)-99 °F (37.2 °C)]   Pulse:  [75-85]   Resp:  [16-18]   BP: (169-184)/(99)   SpO2:  [96 %-97 %]    Wt Readings from Last 1 Encounters:   02/09/23 97.5 kg (215 lb)     Physical Exam  Constitutional:       General: He is not in acute distress.     Appearance: He is not toxic-appearing or diaphoretic.   HENT:      Head: Normocephalic and atraumatic.   Eyes:      General: No scleral icterus.     Conjunctiva/sclera: Conjunctivae normal.   Neck:      Thyroid: No thyromegaly.      Vascular: No carotid bruit, hepatojugular reflux or JVD.      Trachea: No tracheal deviation.   Cardiovascular:      Rate and Rhythm: Normal rate and regular rhythm. No extrasystoles are present.     Chest Wall: PMI is not displaced.      Pulses:           Carotid pulses are 2+ on the right side and 2+ on the left side.       Radial pulses are 2+ on the right side and 2+ on the left side.        Dorsalis pedis pulses are 2+ on the right side and 2+ on the left side.        Posterior tibial pulses are 2+ on the right side and 2+ on the left side.      Heart sounds: S1 normal and S2 normal. No murmur heard.    No S3 or S4 sounds.   Pulmonary:      Effort: No accessory muscle usage or respiratory distress.      Breath sounds: No  decreased breath sounds, wheezing, rhonchi or rales.   Abdominal:      General: Bowel sounds are normal.      Palpations: Abdomen is soft.      Tenderness: There is no abdominal tenderness.   Musculoskeletal:         General: No tenderness or deformity.      Cervical back: Neck supple.      Right lower leg: No edema.      Left lower leg: No edema.   Skin:     General: Skin is warm and dry.      Coloration: Skin is not pale.      Nails: There is no clubbing.      Comments: No signs of embolic events.   Neurological:      Mental Status: He is alert and oriented to person, place, and time.      Cranial Nerves: No cranial nerve deficit.      Sensory: No sensory deficit.   Psychiatric:         Speech: Speech normal.         Behavior: Behavior normal. Behavior is cooperative.       Telemetry  Sinus rhythm    EKG  Sinus rhythm with right bundle delay    Echocardiogram  Normal left and right ventricular function with normal sized atria.  Although was appropriately ordered, a bubble study was not performed.    Labs  Recent Results (from the past 72 hour(s))   POCT glucose    Collection Time: 02/07/23  7:58 PM   Result Value Ref Range    POCT Glucose 171 (H) 70 - 110 mg/dL   Urinalysis, Reflex to Urine Culture Urine, Clean Catch    Collection Time: 02/07/23  8:30 PM    Specimen: Urine   Result Value Ref Range    Specimen UA Urine, Clean Catch     Color, UA Yellow Yellow, Straw, Doris    Appearance, UA Clear Clear    pH, UA 6.0 5.0 - 8.0    Specific Gravity, UA 1.030 1.005 - 1.030    Protein, UA Trace (A) Negative    Glucose, UA Negative Negative    Ketones, UA Trace (A) Negative    Bilirubin (UA) Negative Negative    Occult Blood UA Negative Negative    Nitrite, UA Negative Negative    Urobilinogen, UA Negative Negative EU/dL    Leukocytes, UA Negative Negative   Urinalysis    Collection Time: 02/09/23  8:33 AM   Result Value Ref Range    Specimen UA Urine, Clean Catch     Color, UA Yellow Yellow, Straw, Doris    Appearance,  UA Clear Clear    pH, UA 7.0 5.0 - 8.0    Specific Gravity, UA 1.020 1.005 - 1.030    Protein, UA Trace (A) Negative    Glucose, UA Negative Negative    Ketones, UA 3+ (A) Negative    Bilirubin (UA) Negative Negative    Occult Blood UA Negative Negative    Nitrite, UA Negative Negative    Urobilinogen, UA Negative <2.0 EU/dL    Leukocytes, UA Negative Negative   CBC auto differential    Collection Time: 02/09/23  9:04 AM   Result Value Ref Range    WBC 10.63 3.90 - 12.70 K/uL    RBC 5.66 4.60 - 6.20 M/uL    Hemoglobin 15.0 14.0 - 18.0 g/dL    Hematocrit 44.9 40.0 - 54.0 %    MCV 79 (L) 82 - 98 fL    MCH 26.5 (L) 27.0 - 31.0 pg    MCHC 33.4 32.0 - 36.0 g/dL    RDW 15.2 (H) 11.5 - 14.5 %    Platelets 210 150 - 450 K/uL    MPV 10.3 9.2 - 12.9 fL    Immature Granulocytes 0.5 0.0 - 0.5 %    Gran # (ANC) 7.4 1.8 - 7.7 K/uL    Immature Grans (Abs) 0.05 (H) 0.00 - 0.04 K/uL    Lymph # 1.6 1.0 - 4.8 K/uL    Mono # 1.4 (H) 0.3 - 1.0 K/uL    Eos # 0.1 0.0 - 0.5 K/uL    Baso # 0.09 0.00 - 0.20 K/uL    nRBC 0 0 /100 WBC    Gran % 70.0 38.0 - 73.0 %    Lymph % 15.1 (L) 18.0 - 48.0 %    Mono % 12.7 4.0 - 15.0 %    Eosinophil % 0.9 0.0 - 8.0 %    Basophil % 0.8 0.0 - 1.9 %    Differential Method Automated    Comprehensive metabolic panel    Collection Time: 02/09/23 10:04 AM   Result Value Ref Range    Sodium 138 136 - 145 mmol/L    Potassium 4.1 3.5 - 5.1 mmol/L    Chloride 105 95 - 110 mmol/L    CO2 23 23 - 29 mmol/L    Glucose 90 70 - 110 mg/dL    BUN 11 8 - 23 mg/dL    Creatinine 0.9 0.5 - 1.4 mg/dL    Calcium 9.5 8.7 - 10.5 mg/dL    Total Protein 7.8 6.0 - 8.4 g/dL    Albumin 3.7 3.5 - 5.2 g/dL    Total Bilirubin 0.5 0.1 - 1.0 mg/dL    Alkaline Phosphatase 71 55 - 135 U/L    AST 18 10 - 40 U/L    ALT 12 10 - 44 U/L    Anion Gap 10 8 - 16 mmol/L    eGFR >60 >60 mL/min/1.73 m^2   POCT glucose    Collection Time: 02/09/23  4:46 PM   Result Value Ref Range    POCT Glucose 87 70 - 110 mg/dL   Blood culture    Collection Time:  02/09/23  5:29 PM    Specimen: Antecubital, Left Arm; Blood   Result Value Ref Range    Blood Culture, Routine No Growth to date    Blood culture (site 1)    Collection Time: 02/09/23  5:29 PM    Specimen: Antecubital, Right Arm; Blood   Result Value Ref Range    Blood Culture, Routine No Growth to date    Lactic acid, plasma    Collection Time: 02/09/23  5:29 PM   Result Value Ref Range    Lactate (Lactic Acid) 1.1 0.5 - 2.2 mmol/L   D dimer, quantitative    Collection Time: 02/09/23  5:29 PM   Result Value Ref Range    D-Dimer 0.55 (H) <0.50 mg/L FEU   Protime-INR    Collection Time: 02/09/23  5:29 PM   Result Value Ref Range    Prothrombin Time 10.9 9.0 - 12.5 sec    INR 1.0 0.8 - 1.2   APTT    Collection Time: 02/09/23  5:29 PM   Result Value Ref Range    aPTT 27.2 21.0 - 32.0 sec   POCT glucose    Collection Time: 02/09/23  7:56 PM   Result Value Ref Range    POCT Glucose 124 (H) 70 - 110 mg/dL   Comprehensive Metabolic Panel (CMP)    Collection Time: 02/10/23  4:44 AM   Result Value Ref Range    Sodium 135 (L) 136 - 145 mmol/L    Potassium 4.0 3.5 - 5.1 mmol/L    Chloride 103 95 - 110 mmol/L    CO2 19 (L) 23 - 29 mmol/L    Glucose 97 70 - 110 mg/dL    BUN 13 8 - 23 mg/dL    Creatinine 0.8 0.5 - 1.4 mg/dL    Calcium 9.5 8.7 - 10.5 mg/dL    Total Protein 7.4 6.0 - 8.4 g/dL    Albumin 3.6 3.5 - 5.2 g/dL    Total Bilirubin 0.5 0.1 - 1.0 mg/dL    Alkaline Phosphatase 73 55 - 135 U/L    AST 16 10 - 40 U/L    ALT 9 (L) 10 - 44 U/L    Anion Gap 13 8 - 16 mmol/L    eGFR >60 >60 mL/min/1.73 m^2   Hemoglobin A1c    Collection Time: 02/10/23  4:44 AM   Result Value Ref Range    Hemoglobin A1C 5.7 (H) 4.0 - 5.6 %    Estimated Avg Glucose 117 68 - 131 mg/dL   CBC with Automated Differential    Collection Time: 02/10/23  4:44 AM   Result Value Ref Range    WBC 9.54 3.90 - 12.70 K/uL    RBC 5.87 4.60 - 6.20 M/uL    Hemoglobin 15.4 14.0 - 18.0 g/dL    Hematocrit 47.1 40.0 - 54.0 %    MCV 80 (L) 82 - 98 fL    MCH 26.2 (L) 27.0  - 31.0 pg    MCHC 32.7 32.0 - 36.0 g/dL    RDW 14.9 (H) 11.5 - 14.5 %    Platelets 213 150 - 450 K/uL    MPV 10.2 9.2 - 12.9 fL    Immature Granulocytes 0.3 0.0 - 0.5 %    Gran # (ANC) 6.4 1.8 - 7.7 K/uL    Immature Grans (Abs) 0.03 0.00 - 0.04 K/uL    Lymph # 1.4 1.0 - 4.8 K/uL    Mono # 1.3 (H) 0.3 - 1.0 K/uL    Eos # 0.3 0.0 - 0.5 K/uL    Baso # 0.08 0.00 - 0.20 K/uL    nRBC 0 0 /100 WBC    Gran % 67.5 38.0 - 73.0 %    Lymph % 15.0 (L) 18.0 - 48.0 %    Mono % 13.8 4.0 - 15.0 %    Eosinophil % 2.6 0.0 - 8.0 %    Basophil % 0.8 0.0 - 1.9 %    Differential Method Automated    Sedimentation rate    Collection Time: 02/10/23  4:44 AM   Result Value Ref Range    Sed Rate 14 (H) 0 - 10 mm/Hr   POCT glucose    Collection Time: 02/10/23  7:55 AM   Result Value Ref Range    POCT Glucose 95 70 - 110 mg/dL   Echo Saline Bubble? Yes    Collection Time: 02/10/23 12:20 PM   Result Value Ref Range    BSA 2.26 m2    TDI SEPTAL 0.05 m/s    LV LATERAL E/E' RATIO 15.50 m/s    LV SEPTAL E/E' RATIO 12.40 m/s    LA WIDTH 3.50 cm    IVC diameter 18 cm    Left Ventricular Outflow Tract Mean Velocity 0.79 cm/s    Left Ventricular Outflow Tract Mean Gradient 2.89 mmHg    TDI LATERAL 0.04 m/s    PV PEAK VELOCITY 0.78 cm/s    LVIDd 5.09 3.5 - 6.0 cm    IVS 0.91 0.6 - 1.1 cm    Posterior Wall 0.93 0.6 - 1.1 cm    LVIDs 2.77 2.1 - 4.0 cm    FS 46 28 - 44 %    LA volume 46.32 cm3    LV mass 167.78 g    LA size 3.12 cm    Left Ventricle Relative Wall Thickness 0.37 cm    AV mean gradient 4 mmHg    AV valve area 3.36 cm2    AV Velocity Ratio 0.87     AV index (prosthetic) 0.80     MV valve area p 1/2 method 5.33 cm2    E/A ratio 0.63     Mean e' 0.05 m/s    E wave deceleration time 163.01 msec    Pulm vein S/D ratio 1.29     LVOT diameter 2.31 cm    LVOT area 4.2 cm2    LVOT peak brayan 1.17 m/s    LVOT peak VTI 22.00 cm    Ao peak brayan 1.35 m/s    Ao VTI 27.4 cm    LVOT stroke volume 92.15 cm3    AV peak gradient 7 mmHg    E/E' ratio 13.78 m/s    MV  Peak E Kushal 0.62 m/s    TR Max Kushal 2.53 m/s    MV stenosis pressure 1/2 time 41.27 ms    MV Peak A Kushal 0.99 m/s    PV Peak S Kushal 0.71 m/s    PV Peak D Kushal 0.55 m/s    LV Systolic Volume 28.69 mL    LV Systolic Volume Index 12.8 mL/m2    LV Diastolic Volume 123.32 mL    LV Diastolic Volume Index 55.05 mL/m2    LA Volume Index 20.7 mL/m2    LV Mass Index 75 g/m2    RA Major Axis 4.49 cm    Left Atrium Minor Axis 4.61 cm    Left Atrium Major Axis 5.44 cm    Triscuspid Valve Regurgitation Peak Gradient 26 mmHg    LA Volume Index (Mod) 21.4 mL/m2    LA volume (mod) 48.00 cm3    RA Width 3.80 cm    Right Atrial Pressure (from IVC) 3 mmHg    EF 65 %    TV rest pulmonary artery pressure 29 mmHg   POCT glucose    Collection Time: 02/10/23 12:34 PM   Result Value Ref Range    POCT Glucose 104 70 - 110 mg/dL        Imaging  X-Ray Lumbar Spine Ap And Lateral    Result Date: 2/7/2023  EXAMINATION: XR LUMBAR SPINE AP AND LATERAL CLINICAL HISTORY: low back pain; TECHNIQUE: AP, lateral and spot images were performed of the lumbar spine. COMPARISON: 11/13/2019.  Scoliosis radiographs from 08/31/2022. FINDINGS: There is L4 through S1 posterior instrumented spinal fusion with an L5-S1 intervertebral disc spacer.  Hardware is intact and well aligned on this study.  Partially imaged hardware overlies the spot view.  There is moderate disc height loss at L2-L3 with adjacent endplate sclerosis, significantly progressed in comparison with 11/13/2019, unchanged from 08/31/2022.  There is minimal retrolisthesis of L2 on L3.  Alignment is otherwise unremarkable.  Remaining visualized osseous structures are intact.     No acute fracture or subluxation of the lumbar spine. Postoperative changes of L4 through S1 posterior spinal fusion. Degenerative changes at L2-L3 as above. Electronically signed by: Rg Sims Date:    02/07/2023 Time:    20:45    US Scrotum And Testicles    Result Date: 2/9/2023  EXAMINATION: US SCROTUM AND TESTICLES  CLINICAL HISTORY: Testicular pain, unspecified TECHNIQUE: Sonography of the scrotum and testes. COMPARISON: None. FINDINGS: Right Testicle: *Size: 4.8 x 3 x 3.3 cm *Appearance: Normal. *Flow: Normal arterial and venous flow *Epididymis: Normal. *Hydrocele: None. *Varicocele: None. . Left Testicle: *Size: 4.2 x 3.1 x 3 cm *Appearance: Normal. *Flow: Normal arterial and venous flow *Epididymis: Normal. *Hydrocele: None. *Varicocele: None. . Other findings: None.     No significant abnormality. Electronically signed by: Ashley Montes Date:    02/09/2023 Time:    11:14    CT Abdomen Pelvis With Contrast    Result Date: 2/9/2023  EXAMINATION: CT ABDOMEN PELVIS WITH CONTRAST CLINICAL HISTORY: Abdominal pain, acute, nonlocalized; TECHNIQUE: Low dose axial images, sagittal and coronal reformations were obtained from the lung bases to the pubic symphysis following the IV administration of 100 mL of Omnipaque 350 .  Oral contrast was not given. COMPARISON: Scrotal ultrasound same day, CT abdomen and pelvis 07/01/2021, right upper quadrant ultrasound 09/28/2020, renal ultrasound 09/27/2016 FINDINGS: Imaged lung bases show scattered areas of platelike scarring versus atelectasis and minimal dependent atelectasis without consolidation or pleural effusion.  Base of the heart is normal in size without significant pericardial fluid. Liver, gallbladder, pancreas, spleen, stomach, duodenum and bilateral adrenal glands are within normal limits.  No biliary ductal dilatation. Bilateral kidneys are normal in size, shape and location.  Multifocal subcentimeter hypodense cortical foci noted throughout each kidney, some of which appear wedge-shaped, and all are new from 07/01/2021 study.  No hydronephrosis or significant perinephric stranding.  Ureters are nondilated.  Urinary bladder well distended without wall thickening.  Prostate and seminal vesicles are within normal limits. Appendix and terminal ileum are within normal limits.   Moderate amount of stool noted throughout the colon.  Numerous scattered colonic diverticula without convincing evidence of acute diverticulitis.  No evidence of bowel obstruction or acute inflammation.  No pneumatosis or portal venous gas. Scattered subcentimeter lymph nodes throughout the abdomen and pelvis and at the bilateral inguinal regions similar to prior, none pathologically enlarged by CT criteria.  No ascites or free air.  No significant atherosclerosis.  No aortic aneurysm or dissection. Small fat containing umbilical hernia. There is redemonstrated osseous fusion of the right hip and also lumbar spine, with hardware present at the lumbar spine, right hip and proximal femur and associated beam hardening with streak artifact.  Osseous structures appear grossly stable without acute or destructive process seen.     1. Bilateral renal new multifocal subcentimeter hypoattenuating cortical foci, some of which demonstrate wedge-shaped configuration concerning for multifocal infarcts potentially from embolic etiology.  Sequela of pyelonephritis not excluded.  Clinical correlation and with urinalysis advised.  No hydronephrosis. 2. Diverticulosis coli without diverticulitis. 3. Grossly stable additional findings as above. This report was flagged in Epic as abnormal. Electronically signed by: Adarsh Baires MD Date:    02/09/2023 Time:    14:20    US Lower Extremity Veins Bilateral    Result Date: 2/9/2023  EXAMINATION: US LOWER EXTREMITY VEINS BILATERAL CLINICAL HISTORY: r/o dvt; TECHNIQUE: Duplex and color flow Doppler evaluation of the bilateral lower extremity veins was performed. COMPARISON: August 2021. FINDINGS: No evidence of clot involving the bilateral common femoral, greater saphenous, femoral, popliteal, peroneal, anterior and posterior tibial veins.  All venous structures demonstrate normal respiratory phasicity and augment adequately.  No evidence of soft tissue mass or Baker's cyst.     No evidence of  lower extremity deep venous thrombosis. Electronically signed by: Ronna Echeverria MD Date:    02/09/2023 Time:    19:35    Echo Saline Bubble? Yes    Result Date: 2/10/2023  · The left ventricle is normal in size with normal systolic function. · Grade I left ventricular diastolic dysfunction. · Normal right ventricular size with normal right ventricular systolic function.        Assessment    Embolic disease of bilateral kidneys.  Is it is a cause of his unilateral flank pain?    Hypertensive heart disease with chronic diastolic heart failure  Blood pressures are high but could be situational.  Heart failure is compensated.    Plan and Discussion    It seems odd that he would demonstrate multiple bilateral renal emboli without evidence of embolic disease in any other organ system.  Without a clear source, for now would initiate dual anti-platelet therapy.  Continue workup.  Telemetry to monitor for atrial arrhythmias.  I have asked echocardiogram technician to repeat the echocardiogram with a bubble study.  Depending on those results, may need a transesophageal echocardiogram.  If no evidence of arrhythmias on telemetry, consideration of loop recorder would also be appropriate.  Much of this workup can be deferred to the outpatient arena.      Elan Smith MD

## 2023-02-10 NOTE — ASSESSMENT & PLAN NOTE
Ezekiel Noyola is a 62 y.o. male who is found to have new b/l wedge-shaped renal infarcts.     - Consult d/w Staff Urologist  - Strict I's/O's  - Trend Cr, avoid nephrotoxic agents, and dose medications to patient's current GFR  - No plans for urologic interventions   - Recommend cardiology consult and to f/u echo results   - Patient without difficulty urinating and with baseline Cr   - All remaining care per primary team  - Please reach out to urology should any questions or concerns arise

## 2023-02-10 NOTE — ASSESSMENT & PLAN NOTE
Patient with renal infarct found on CT abdomen pelvis.  Patient denies any family history of hypercoagulable states.  He has had previous elevated D-dimers in his chart.  Denies any blood clots or pulmonary emboli.  Patient denies any trauma to his torso.  Patient reports he has had episodes of shortness of breath for the past 6 months.  Will check a lactic acid and blood cultures to rule out septic emboli.  Echo with bubble study ordered to rule out cardio emboli and endocarditis.  Patient denies IV drug use.  Patient denies a history of atrial fibrillation.  Patient in normal sinus rhythm on EKG.  Creatinine is 0.9 and GFR is greater than 60.  Full-dose Lovenox b.i.d. started for anticoagulation.  P.r.n. analgesics ordered for pain.  Patient also had an ultrasound of his scrotum and testicles that was negative.  CK, ESR and CRP pending.

## 2023-02-10 NOTE — PLAN OF CARE
Patient AAOx3, ambulates with cane when walking long distances.     PCP correct on facehseet. Will likely require transportation home.   02/10/23 1221   Discharge Assessment   Assessment Type Discharge Planning Assessment   Confirmed/corrected address, phone number and insurance Yes   Confirmed Demographics Correct on Facesheet   Source of Information patient   Communicated ITA with patient/caregiver Date not available/Unable to determine   People in Home alone   Do you expect to return to your current living situation? Yes   Do you have help at home or someone to help you manage your care at home? No   Prior to hospitilization cognitive status: Alert/Oriented   Current cognitive status: Alert/Oriented   Walking or Climbing Stairs ambulation difficulty, requires equipment   Equipment Currently Used at Home cane, straight   Readmission within 30 days? No   Do you currently have service(s) that help you manage your care at home? No   Do you take prescription medications? Yes   Do you have prescription coverage? Yes   Do you have any problems affording any of your prescribed medications? No   Is the patient taking medications as prescribed? yes   How do you get to doctors appointments? other (see comments)  ([employer])   Are you on dialysis? No   Do you take coumadin? No   Discharge Plan A Home   DME Needed Upon Discharge  none   Discharge Plan discussed with: Patient   Discharge Barriers Identified None   Physical Activity   On average, how many days per week do you engage in moderate to strenuous exercise (like a brisk walk)? 0 days   On average, how many minutes do you engage in exercise at this level? 0 min   Financial Resource Strain   How hard is it for you to pay for the very basics like food, housing, medical care, and heating? Not hard   Housing Stability   In the last 12 months, was there a time when you were not able to pay the mortgage or rent on time? N   In the last 12 months, was there a time when you did not  have a steady place to sleep or slept in a shelter (including now)? N   Transportation Needs   In the past 12 months, has lack of transportation kept you from medical appointments or from getting medications? no   In the past 12 months, has lack of transportation kept you from meetings, work, or from getting things needed for daily living? No   Food Insecurity   Within the past 12 months, you worried that your food would run out before you got the money to buy more. Never true   Within the past 12 months, the food you bought just didn't last and you didn't have money to get more. Never true   Stress   Do you feel stress - tense, restless, nervous, or anxious, or unable to sleep at night because your mind is troubled all the time - these days? Not at all   Social Connections   In a typical week, how many times do you talk on the phone with family, friends, or neighbors? More than 3   How often do you get together with friends or relatives? Three times   How often do you attend Anabaptism or Restorationist services? Never   Do you belong to any clubs or organizations such as Anabaptism groups, unions, fraternal or athletic groups, or school groups? No   How often do you attend meetings of the clubs or organizations you belong to? Never   Are you , , , , never , or living with a partner?    Alcohol Use   Q1: How often do you have a drink containing alcohol? Never   Q2: How many drinks containing alcohol do you have on a typical day when you are drinking? None   Q3: How often do you have six or more drinks on one occasion? Never     [facility] ([address])  Initial Discharge Assessment       Primary Care Provider: Dionisio Avila MD    Admission Diagnosis: Renal infarct [N28.0]  Testicular pain [N50.819]  Infarction of kidney [N28.0]  Chest pain [R07.9]    Admission Date: 2/9/2023  Expected Discharge Date:     Discharge Barriers Identified: (P) None    Payor: [employer]  MEDICARE / Plan: HU[employer] SNP (SPECIAL NEEDS PLAN) / Product Type: Medicare Advantage /     Extended Emergency Contact Information  Primary Emergency Contact: Jass Hargrove   [facility]  Home Phone: [phone]  Mobile Phone: [phone]  Relation: Son    Discharge Plan A: (P) Home         [facility] - [place], [place] - [address]  [address]  [facility] [address]  Phone: [phone] Fax: [phone]    [id number] - [place], [place] - [address]  [address]  [facility] [address]  Phone: [phone] Fax: [phone]      Initial Assessment (most recent)       Adult Discharge Assessment - 02/10/23 1221          Discharge Assessment    Assessment Type Discharge Planning Assessment (P)      Confirmed/corrected address, phone number and insurance Yes (P)      Confirmed Demographics Correct on Facesheet (P)      Source of Information patient (P)      Communicated ITA with patient/caregiver Date not available/Unable to determine (P)      People in Home alone (P)      Do you expect to return to your current living situation? Yes (P)      Do you have help at home or someone to help you manage your care at home? No (P)      Prior to hospitilization cognitive status: Alert/Oriented (P)      Current cognitive status: Alert/Oriented (P)      Walking or Climbing Stairs ambulation difficulty, requires equipment (P)      Equipment Currently Used at Home cane, straight (P)      Readmission within 30 days? No (P)      Do you currently have service(s) that help you manage your care at home? No (P)      Do you take prescription medications? Yes (P)      Do you have prescription coverage? Yes (P)      Do you have any problems affording any of your prescribed medications? No (P)      Is the patient taking medications as prescribed? yes (P)      How do you get to doctors appointments? other (see comments)  (P)    [employer]    Are you on dialysis? No (P)      Do you take coumadin? No (P)      Discharge Plan A Home (P)      DME Needed Upon Discharge  none (P)      Discharge Plan discussed with: Patient (P)      Discharge Barriers Identified None (P)         Physical Activity    On average, how many days per week do you engage in moderate to strenuous exercise (like a brisk walk)? 0 days (P)      On average, how many minutes do you engage in exercise at this level? 0 min (P)         Financial Resource Strain    How hard is it for you to pay for the very basics like food, housing, medical care, and heating? Not hard at all (P)         Housing Stability    In the last 12 months, was there a time when you were not able to pay the mortgage or rent on time? No (P)      In the last 12 months, was there a time when you did not have a steady place to sleep or slept in a shelter (including now)? No (P)         Transportation Needs    In the past 12 months, has lack of transportation kept you from medical appointments or from getting medications? No (P)      In the past 12 months, has lack of transportation kept you from meetings, work, or from getting things needed for daily living? No (P)         Food Insecurity    Within the past 12 months, you worried that your food would run out before you got the money to buy more. Never true (P)      Within the past 12 months, the food you bought just didn't last and you didn't have money to get more. Never true (P)         Stress    Do you feel stress - tense, restless, nervous, or anxious, or unable to sleep at night because your mind is troubled all the time - these days? Not at all (P)         Social Connections    In a typical week, how many times do you talk on the phone with family, friends, or neighbors? More than three times a week (P)      How often do you get together with friends or relatives? Three times a week (P)      How often do you attend Religious or Catholic services? Never (P)       Do you belong to any clubs or organizations such as Restoration groups, unions, fraternal or athletic groups, or school groups? No (P)      How often do you attend meetings of the clubs or organizations you belong to? Never (P)      Are you , , , , never , or living with a partner?  (P)         Alcohol Use    Q1: How often do you have a drink containing alcohol? Never (P)      Q2: How many drinks containing alcohol do you have on a typical day when you are drinking? Patient does not drink (P)      Q3: How often do you have six or more drinks on one occasion? Never (P)

## 2023-02-10 NOTE — ASSESSMENT & PLAN NOTE
Flank pain  - Patient with bilateral renal infarct found on CT abdomen pelvis which was concerning for embolic phenomenon   - Patient denies any family history of hypercoagulable states.  He has had previous elevated D-dimers in his chart.  Denies any blood clots or pulmonary emboli.  Patient denies any trauma to his torso.  Patient reports he has had episodes of shortness of breath for the past 6 months.  Patient denies IV drug use.  Patient denies a history of atrial fibrillation.  Patient in normal sinus rhythm on EKG.   - Low suspicion for infectious etiology that can result in possible septic emboli at this time.  No source of infection, he is afebrile and with a normal WBC. Blood culture are NGTD.  - Ultrasound negative for DVT and ultrasound of his scrotum and testicles negative.   - Further workup with CTA of chest to rule out PE pending along with renal arterial ultrasound  - Workup with Echo with bubble study pending   - Continue to monitor on telemetry to assess for ectopy  - Continue full-dose Lovenox b.i.d. started for anticoagulation.    - P.r.n. analgesics ordered for pain  - Appreciate Cardiology and Urology input  - Continue to monitor with repeat labs and follow up on studies

## 2023-02-10 NOTE — PLAN OF CARE
Problem: Adult Inpatient Plan of Care  Goal: Plan of Care Review  Outcome: Ongoing, Progressing  Goal: Patient-Specific Goal (Individualized)  Outcome: Ongoing, Progressing  Goal: Absence of Hospital-Acquired Illness or Injury  Outcome: Ongoing, Progressing  Goal: Optimal Comfort and Wellbeing  Outcome: Ongoing, Progressing  POC reviewed with pt. A&Ox4. Room air. No acute changes. PRN medication administered for pain. Safety checks performed. Bed in lowest position. Wheels locked. Call light in reach. WCTM.

## 2023-02-10 NOTE — PROGRESS NOTES
[facility] [address] Medicine  Progress Note    Patient Name: Ezekiel Noyola  MRN: [id number]  Patient Class: OP- Observation   Admission Date: 2/9/2023  Length of Stay: 0 days  Attending Physician: Renetta Penny MD  Primary Care Provider: Dionisio Avila MD        Subjective:     Principal Problem:Renal infarct        HPI:  Mr. Falcon is a 62-year-old male with a past medical history that includes hypertension, diabetes mellitus type 2, untreated hepatitis-C.  He also has had a lumbar fusion in the past and has chronic back pain along with neuropathy of his bilateral feet.  Patient came to the emergency department today for pain that began 3 days ago in his lower abdomen and has begun to radiate towards his right groin and around his left flank.  Patient states he was in the middle of doing his laundry and had an acute onset of this pain.  He went to the emergency department at [facility] and they sent him home on ibuprofen.  Patient denies any trauma or dysuria.  The pain is worse with tactile touch.  Patient denies chest pain and shortness of breath.  He is prescribed Norco at home for his back pain.  This medication did not help control his new flank, abdominal, scrotal pain.  In the emergency department patient had a temperature of 99.2°, white blood cell count of 10.63, creatinine of 0.9, and a GFR greater than 60.  He had an ultrasound of his scrotum and testicles which did not identify any significant abnormalities.  Patient had a CT was abdomen pelvis with contrast the reports new bilateral hypo attenuations that appear wedge-shaped configuration in could indicate emboli.  There is no hydronephrosis.  Patient admitted to hospital medicine for further management.      Overview/Hospital Course:  Mr. Noyola presented with left flank and groin pain.  Placed in observation status.  Workup in the ER with CT of the abdomen pelvis with contrast remarkable for bilateral renal infarcts concerning  for embolic phenomenon.  D-dimer elevated 0.55.  Ultrasound of lower extremities negative for DVT.  Anticoagulated with full-dose Lovenox.  Echo pending.  Cardiology and Urology consulted.      Interval History:  No acute events, patient reports he is feeling much better.  No further left flank pain or when pain.  He still reports back pain, but it is consistent with his chronic back pain and not something new.  No reported chest pain or shortness of breath.    Review of Systems   Constitutional:  Negative for chills and fever.   Gastrointestinal:  Negative for abdominal pain.   Genitourinary:  Negative for decreased urine volume, difficulty urinating, dysuria, flank pain and hematuria.   Musculoskeletal:  Positive for back pain.   Objective:     Vital Signs (Most Recent):  Temp: 98.3 °F (36.8 °C) (02/10/23 1629)  Pulse: 74 (02/10/23 1629)  Resp: 18 (02/10/23 1629)  BP: (!) 178/106 (02/10/23 1629)  SpO2: (!) 94 % (02/10/23 1629)   Vital Signs (24h Range):  Temp:  [97.7 °F (36.5 °C)-99 °F (37.2 °C)] 98.3 °F (36.8 °C)  Pulse:  [71-85] 74  Resp:  [16-18] 18  SpO2:  [93 %-98 %] 94 %  BP: (155-184)/() 178/106     Weight: 97.5 kg (215 lb)  Body mass index is 27.6 kg/m².    Intake/Output Summary (Last 24 hours) at 2/10/2023 1643  Last data filed at 2/10/2023 1626  Gross per 24 hour   Intake 0 ml   Output 1675 ml   Net -1675 ml      Physical Exam  Vitals and nursing note reviewed.   Constitutional:       General: He is not in acute distress.     Appearance: Normal appearance. He is not ill-appearing, toxic-appearing or diaphoretic.   HENT:      Head: Normocephalic and atraumatic.      Nose: Nose normal.      Mouth/Throat:      Pharynx: No posterior oropharyngeal erythema.   Eyes:      Extraocular Movements: Extraocular movements intact.      Conjunctiva/sclera: Conjunctivae normal.   Cardiovascular:      Rate and Rhythm: Normal rate and regular rhythm.      Pulses: Normal pulses.      Heart sounds: Normal heart  sounds. No murmur heard.  Pulmonary:      Effort: Pulmonary effort is normal. No respiratory distress.      Breath sounds: Normal breath sounds. No wheezing.   Abdominal:      General: Bowel sounds are normal. There is no distension.      Palpations: Abdomen is soft.      Tenderness: There is no abdominal tenderness. There is no guarding or rebound.   Musculoskeletal:         General: No swelling, tenderness or signs of injury. Normal range of motion.      Cervical back: Normal range of motion and neck supple. No rigidity or tenderness.   Skin:     General: Skin is warm and dry.      Capillary Refill: Capillary refill takes less than 2 seconds.   Neurological:      General: No focal deficit present.      Mental Status: He is alert and oriented to person, place, and time.      Cranial Nerves: No cranial nerve deficit.      Sensory: No sensory deficit.      Motor: No weakness.   Psychiatric:         Mood and Affect: Mood normal.         Behavior: Behavior normal.         Thought Content: Thought content normal.       Significant Labs: All pertinent labs within the past 24 hours have been reviewed.    Significant Imaging: I have reviewed all pertinent imaging results/findings within the past 24 hours.      Assessment/Plan:      * Renal infarct  Flank pain  - Patient with bilateral renal infarct found on CT abdomen pelvis which was concerning for embolic phenomenon   - Patient denies any family history of hypercoagulable states.  He has had previous elevated D-dimers in his chart.  Denies any blood clots or pulmonary emboli.  Patient denies any trauma to his torso.  Patient reports he has had episodes of shortness of breath for the past 6 months.  Patient denies IV drug use.  Patient denies a history of atrial fibrillation.  Patient in normal sinus rhythm on EKG.   - Low suspicion for infectious etiology that can result in possible septic emboli at this time.  No source of infection, he is afebrile and with a normal  WBC. Blood culture are NGTD.  - Ultrasound negative for DVT and ultrasound of his scrotum and testicles negative.   - Further workup with CTA of chest to rule out PE pending along with renal arterial ultrasound  - Workup with Echo with bubble study pending   - Continue to monitor on telemetry to assess for ectopy  - Continue full-dose Lovenox b.i.d. started for anticoagulation.    - P.r.n. analgesics ordered for pain  - Appreciate Cardiology and Urology input  - Continue to monitor with repeat labs and follow up on studies    Hyperlipidemia  - Continue ezetimibe 10 mg p.o. daily    Diabetes mellitus, type 2  - Latest A1c recorded is 5.8 in 2019, and repeat hemoglobin A1c on this admission 5.7 consistent with good control  - At home patient takes metformin extended release 500 mg b.i.d. which will be held during hospitalization   - Glucose levels were reviewed, well controlled  - Continue low-dose sliding scale insulin while inpatient and adjust as needed to achieve glucose between 140-180    Essential hypertension  - Blood pressure still elevated  - Increased carvedilol 12.5 mg b.i.d..yesterday but still not controlled  - Will add amlodipine 5 mg p.o. daily today    DDD (degenerative disc disease), lumbosacral  - Patient chronic lower back pain and previous history of spinal fusion  - Report pain is consistent with his baseline levels  - PRN analgesics      VTE Risk Mitigation (From admission, onward)         Ordered     enoxaparin injection 40 mg  Daily         02/10/23 1540     IP VTE LOW RISK PATIENT  Once         02/09/23 1544     Place sequential compression device  Until discontinued         02/09/23 1544                      Renetta Penny MD  Department of Hospital Medicine   [facility] ([address])

## 2023-02-10 NOTE — H&P
[facility] [address] Medicine  History & Physical    Patient Name: Ezekiel Noyola  MRN: [id number]  Patient Class: OP- Observation  Admission Date: 2/9/2023  Attending Physician: Heber Wetzel MD   Primary Care Provider: Dionisio Avila MD         Patient information was obtained from patient and ER records.     Subjective:     Principal Problem:Renal infarct    Chief Complaint:   Chief Complaint   Patient presents with    flank pain      Pt brought in by NO EMS Pt c.o left flank pain radiates to groin onset 3 days.   No hx of kidney stone.  Pt seen at [facility] and advised to follow up with neruology. Pt took hydrocodone this AM with no relief. AAO x 3 nadn         HPI: Mr. Falcon is a 62-year-old male with a past medical history that includes hypertension, diabetes mellitus type 2, treated hepatitis-C.  He also has had a lumbar fusion in the past and has chronic back pain along with neuropathy of his bilateral feet.  Patient came to the emergency department today for pain that began 3 days ago in his lower abdomen and has begun to radiate towards his right groin and around his left flank.  Patient states he was in the middle of doing his laundry and had an acute onset of this pain.  He went to the emergency department at [facility] and they sent him home on ibuprofen.  Patient denies any trauma or dysuria.  The pain is worse with tactile touch.  Patient denies chest pain and shortness of breath.  He is prescribed Norco at home for his back pain.  This medication did not help control his new flank, abdominal, scrotal pain.  In the emergency department patient had a temperature of 99.2°, white blood cell count of 10.63, creatinine of 0.9, and a GFR greater than 60.  He had an ultrasound of his scrotum and testicles which did not identify any significant abnormalities.  Patient had a CT was abdomen pelvis with contrast the reports new bilateral hypo attenuations that appear wedge-shaped  configuration in could indicate emboli.  There is no hydronephrosis.  Patient admitted to hospital medicine for further management.      Past Medical History:   Diagnosis Date    Diabetes mellitus     Gout     Hepatitis C     Hypercholesteremia     Hypertension        Past Surgical History:   Procedure Laterality Date    ARTHROPLASTY, KNEE, TOTAL, SIGHT ASSISTED Left 7/26/2021    Procedure: ARTHROPLASTY, KNEE, TOTAL, SIGHT ASSISTED;  Surgeon: Jan Rust MD;  Location: [facility];  Service: Orthopedics;  Laterality: Left;    BACK SURGERY      CORONARY ANGIOGRAPHY  08/24/2016    no CAD    FRACTURE SURGERY Left     leg    FUSION OF SPINE WITH INSTRUMENTATION N/A 12/20/2018    Procedure: FUSION, SPINE, WITH INSTRUMENTATION Removal of spinal hardware, Bilateral Jamison-White Osteotomy L5-S1, L5-S1 Interbody Arthrodesis, L4-S1 Segmental Instrumentation with Posterior Lateral Arthrodesis;  Surgeon: Ezequiel Pedroza MD;  Location: [facility];  Service: Neurosurgery;  Laterality: N/A;  Procedure: Removal of spinal hardware, Revision of Posterolateral fusion from L3-S1, L5-S1 In    HIP SURGERY Right     hip fusion    INJECTION OF STEROID Right 3/22/2019    Procedure: INJECTION, STEROID Right SI Joint Block and Steroid Injection;  Surgeon: Ezequiel Pedroza MD;  Location: [facility];  Service: Neurosurgery;  Laterality: Right;  Procedure: Right SI Joint Block and Steroid Injection  Surgery Time: 15 Min  LOS:0  Anesthesia: General  Radiology: C-Arm  Bed: Regular Bed  Position: Prone    KNEE ARTHROSCOPY      LAMINECTOMY      Lumbar    MYELOGRAPHY N/A 11/5/2018    Procedure: Myelogram;  Surgeon: Mel Diagnostic Provider;  Location: [address];  Service: Radiology;  Laterality: N/A;    right wrist surgery      TRIAL OF SPINAL CORD NERVE STIMULATOR N/A 9/20/2019    Procedure: Trial, Neurostimulator, Spinal Cord Spinal Cord Stimulator Trial using 2 Percutaneous Least at T9-10;  Surgeon: Ezequiel Pedroza MD;  Location: [address]  "[facility];  Service: Neurosurgery;  Laterality: N/A;  Procedure: Spinal Cord Stimulator Trial using 2 Percutaneous Least at T9-10  Surgery Time: 1 Hr  LOS: 0  Anesthesia: General  Radiology: C-arm  Bed: Laie 4 Poster  Position: Prone  Eq       Review of patient's allergies indicates:   Allergen Reactions    Lactose     Gabapentin Other (See Comments)     Patient states, "it makes me bleed from my rectum. Don't give it to me."    Atorvastatin Rash     Dye used    Opioids - morphine analogues Rash    Penicillins Rash     Pt states a "a couple of years ago" he took penicillin and broke out in "red spots" with no itching or difficulty breathing       No current facility-administered medications on file prior to encounter.     Current Outpatient Medications on File Prior to Encounter   Medication Sig    aspirin 81 MG Chew Take 1 tablet (81 mg total) by mouth 2 (two) times daily. (Patient taking differently: Take 81 mg by mouth once daily.)    carvediloL (COREG) 6.25 MG tablet Take 6.25 mg by mouth 2 (two) times daily with meals.    ezetimibe (ZETIA) 10 mg tablet Take 10 mg by mouth once daily.    HYDROcodone-acetaminophen (NORCO)  mg per tablet Take 1 tablet by mouth after meals as needed.    meloxicam (MOBIC) 15 MG tablet Take 1 tablet (15 mg total) by mouth once daily.    metFORMIN (GLUCOPHAGE-XR) 500 MG ER 24hr tablet Take 500 mg by mouth.    tamsulosin (FLOMAX) 0.4 mg Cap Take 1 capsule by mouth.    orphenadrine (NORFLEX) 100 mg tablet Take 1 tablet (100 mg total) by mouth 2 (two) times daily. for 10 days    [DISCONTINUED] BD ALCOHOL SWABS PadM     [DISCONTINUED] hydroCHLOROthiazide (HYDRODIURIL) 25 MG tablet Take 1 tablet (25 mg total) by mouth once daily. (Patient not taking: Reported on 11/25/2022)    [DISCONTINUED] ibuprofen (ADVIL,MOTRIN) 800 MG tablet Take 1 tablet (800 mg total) by mouth every 6 (six) hours as needed for Pain.    [DISCONTINUED] metFORMIN (GLUCOPHAGE) 500 MG tablet Take 500 mg by mouth 2 " (two) times daily.    [DISCONTINUED] triamcinolone acetonide 0.1% (KENALOG) 0.1 % ointment Apply topically 2 (two) times daily.    [DISCONTINUED] TRUE METRIX GLUCOSE TEST STRIP Strp     [DISCONTINUED] TRUEPLUS LANCETS 33 gauge Misc      Family History       Problem Relation (Age of Onset)    Diabetes Sister, Brother    Hypertension Sister, Brother          Tobacco Use    Smoking status: Former     Passive exposure: Never    Smokeless tobacco: Never    Tobacco comments:     quit over 30yrs   Substance and Sexual Activity    Alcohol use: No    Drug use: Yes     Types: Marijuana     Comment: daily    Sexual activity: Not on file     Review of Systems   Constitutional: Negative.  Negative for activity change, appetite change, chills and fever.   HENT: Negative.  Negative for congestion, mouth sores and trouble swallowing.    Eyes: Negative.  Negative for photophobia, pain and visual disturbance.   Respiratory: Negative.  Negative for cough, choking, chest tightness and shortness of breath.    Cardiovascular: Negative.  Negative for chest pain, palpitations and leg swelling.   Gastrointestinal:  Positive for abdominal pain. Negative for abdominal distention, blood in stool, constipation, diarrhea, nausea and vomiting.   Endocrine: Negative.  Negative for polydipsia, polyphagia and polyuria.   Genitourinary:  Positive for flank pain and testicular pain. Negative for decreased urine volume, difficulty urinating, dysuria and enuresis.   Musculoskeletal:  Negative for arthralgias, back pain and gait problem.   Skin: Negative.  Negative for pallor and rash.   Allergic/Immunologic: Negative.  Negative for environmental allergies and food allergies.   Neurological: Negative.  Negative for dizziness, facial asymmetry, speech difficulty, weakness and headaches.   Hematological: Negative.         Patient reports that he clots quickly   Psychiatric/Behavioral: Negative.  Negative for agitation, behavioral problems and confusion.     Objective:     Vital Signs (Most Recent):  Temp: 98.3 °F (36.8 °C) (02/09/23 2012)  Pulse: 74 (02/09/23 2012)  Resp: 16 (02/09/23 2013)  BP: (!) 155/79 (02/09/23 2012)  SpO2: (!) 93 % (02/09/23 2012)   Vital Signs (24h Range):  Temp:  [98.2 °F (36.8 °C)-99.2 °F (37.3 °C)] 98.3 °F (36.8 °C)  Pulse:  [71-83] 74  Resp:  [16-20] 16  SpO2:  [93 %-100 %] 93 %  BP: (155-183)/() 155/79     Weight: 97.5 kg (215 lb)  Body mass index is 27.6 kg/m².    Physical Exam  Vitals reviewed.   Constitutional:       General: He is not in acute distress.     Appearance: Normal appearance.   HENT:      Head: Normocephalic and atraumatic.      Mouth/Throat:      Mouth: Mucous membranes are moist.      Pharynx: Oropharynx is clear. No posterior oropharyngeal erythema.   Eyes:      Extraocular Movements: Extraocular movements intact.      Pupils: Pupils are equal, round, and reactive to light.   Cardiovascular:      Rate and Rhythm: Normal rate and regular rhythm.      Pulses: Normal pulses.      Heart sounds: Normal heart sounds. No murmur heard.  Pulmonary:      Effort: Pulmonary effort is normal. No respiratory distress.      Breath sounds: Normal breath sounds. No wheezing.   Abdominal:      General: Bowel sounds are normal. There is no distension.      Palpations: Abdomen is soft.      Tenderness: There is no abdominal tenderness. There is no guarding.   Musculoskeletal:         General: No swelling, tenderness or signs of injury. Normal range of motion.      Cervical back: Normal range of motion and neck supple. No rigidity or tenderness.   Skin:     General: Skin is warm and dry.      Capillary Refill: Capillary refill takes less than 2 seconds.   Neurological:      General: No focal deficit present.      Mental Status: He is alert and oriented to person, place, and time.      Cranial Nerves: No cranial nerve deficit.      Sensory: No sensory deficit.      Motor: No weakness.      Comments: Decreased sensation to bilateral  feet; increased sensitivity to tactile touch around his abdomen left flank and upper thighs   Psychiatric:         Mood and Affect: Mood normal.         Behavior: Behavior normal.         CRANIAL NERVES     CN III, IV, VI   Pupils are equal, round, and reactive to light.     Significant Labs: All pertinent labs within the past 24 hours have been reviewed.  BMP:   Recent Labs   Lab 02/09/23  1004   GLU 90      K 4.1      CO2 23   BUN 11   CREATININE 0.9   CALCIUM 9.5     CBC:   Recent Labs   Lab 02/09/23  0904   WBC 10.63   HGB 15.0   HCT 44.9        CMP:   Recent Labs   Lab 02/09/23  1004      K 4.1      CO2 23   GLU 90   BUN 11   CREATININE 0.9   CALCIUM 9.5   PROT 7.8   ALBUMIN 3.7   BILITOT 0.5   ALKPHOS 71   AST 18   ALT 12   ANIONGAP 10     Lactic Acid:   Recent Labs   Lab 02/09/23  1729   LACTATE 1.1     POCT Glucose:   Recent Labs   Lab 02/09/23  1646 02/09/23  1956   POCTGLUCOSE 87 124*     Urine Culture: No results for input(s): LABURIN in the last 48 hours.  Urine Studies:   Recent Labs   Lab 02/09/23  0833   COLORU Yellow   APPEARANCEUA Clear   PHUR 7.0   SPECGRAV 1.020   PROTEINUA Trace*   GLUCUA Negative   KETONESU 3+*   BILIRUBINUA Negative   OCCULTUA Negative   NITRITE Negative   UROBILINOGEN Negative   LEUKOCYTESUR Negative       Significant Imaging: I have reviewed all pertinent imaging results/findings within the past 24 hours.  US Lower Extremity Veins Bilateral  Narrative: EXAMINATION:  US LOWER EXTREMITY VEINS BILATERAL    CLINICAL HISTORY:  r/o dvt;    TECHNIQUE:  Duplex and color flow Doppler evaluation of the bilateral lower extremity veins was performed.    COMPARISON:  August 2021.    FINDINGS:  No evidence of clot involving the bilateral common femoral, greater saphenous, femoral, popliteal, peroneal, anterior and posterior tibial veins.  All venous structures demonstrate normal respiratory phasicity and augment adequately.  No evidence of soft tissue mass or  Baker's cyst.  Impression: No evidence of lower extremity deep venous thrombosis.    Electronically signed by: Ronna Echeverria MD  Date:    02/09/2023  Time:    19:35  CT Abdomen Pelvis With Contrast  Narrative: EXAMINATION:  CT ABDOMEN PELVIS WITH CONTRAST    CLINICAL HISTORY:  Abdominal pain, acute, nonlocalized;    TECHNIQUE:  Low dose axial images, sagittal and coronal reformations were obtained from the lung bases to the pubic symphysis following the IV administration of 100 mL of Omnipaque 350 .  Oral contrast was not given.    COMPARISON:  Scrotal ultrasound same day, CT abdomen and pelvis 07/01/2021, right upper quadrant ultrasound 09/28/2020, renal ultrasound 09/27/2016    FINDINGS:  Imaged lung bases show scattered areas of platelike scarring versus atelectasis and minimal dependent atelectasis without consolidation or pleural effusion.  Base of the heart is normal in size without significant pericardial fluid.    Liver, gallbladder, pancreas, spleen, stomach, duodenum and bilateral adrenal glands are within normal limits.  No biliary ductal dilatation.    Bilateral kidneys are normal in size, shape and location.  Multifocal subcentimeter hypodense cortical foci noted throughout each kidney, some of which appear wedge-shaped, and all are new from 07/01/2021 study.  No hydronephrosis or significant perinephric stranding.  Ureters are nondilated.  Urinary bladder well distended without wall thickening.  Prostate and seminal vesicles are within normal limits.    Appendix and terminal ileum are within normal limits.  Moderate amount of stool noted throughout the colon.  Numerous scattered colonic diverticula without convincing evidence of acute diverticulitis.  No evidence of bowel obstruction or acute inflammation.  No pneumatosis or portal venous gas.    Scattered subcentimeter lymph nodes throughout the abdomen and pelvis and at the bilateral inguinal regions similar to prior, none pathologically enlarged by  CT criteria.  No ascites or free air.  No significant atherosclerosis.  No aortic aneurysm or dissection.    Small fat containing umbilical hernia.    There is redemonstrated osseous fusion of the right hip and also lumbar spine, with hardware present at the lumbar spine, right hip and proximal femur and associated beam hardening with streak artifact.  Osseous structures appear grossly stable without acute or destructive process seen.  Impression: 1. Bilateral renal new multifocal subcentimeter hypoattenuating cortical foci, some of which demonstrate wedge-shaped configuration concerning for multifocal infarcts potentially from embolic etiology.  Sequela of pyelonephritis not excluded.  Clinical correlation and with urinalysis advised.  No hydronephrosis.  2. Diverticulosis coli without diverticulitis.  3. Grossly stable additional findings as above.  This report was flagged in Epic as abnormal.    Electronically signed by: Adarsh Baires MD  Date:    02/09/2023  Time:    14:20  US Scrotum And Testicles  Narrative: EXAMINATION:  US SCROTUM AND TESTICLES    CLINICAL HISTORY:  Testicular pain, unspecified    TECHNIQUE:  Sonography of the scrotum and testes.    COMPARISON:  None.    FINDINGS:  Right Testicle:    *Size: 4.8 x 3 x 3.3 cm  *Appearance: Normal.  *Flow: Normal arterial and venous flow  *Epididymis: Normal.  *Hydrocele: None.  *Varicocele: None.  .    Left Testicle:    *Size: 4.2 x 3.1 x 3 cm  *Appearance: Normal.  *Flow: Normal arterial and venous flow  *Epididymis: Normal.  *Hydrocele: None.  *Varicocele: None.  .    Other findings: None.  Impression: No significant abnormality.    Electronically signed by: Ashley Montes  Date:    02/09/2023  Time:    11:14        Assessment/Plan:     * Renal infarct  Patient with renal infarct found on CT abdomen pelvis.  Patient denies any family history of hypercoagulable states.  He has had previous elevated D-dimers in his chart.  Denies any blood clots or pulmonary  emboli.  Patient denies any trauma to his torso.  Patient reports he has had episodes of shortness of breath for the past 6 months.  Will check a lactic acid and blood cultures to rule out septic emboli.  Echo with bubble study ordered to rule out cardio emboli and endocarditis.  Patient denies IV drug use.  Patient denies a history of atrial fibrillation.  Patient in normal sinus rhythm on EKG.  Creatinine is 0.9 and GFR is greater than 60.  Full-dose Lovenox b.i.d. started for anticoagulation.  P.r.n. analgesics ordered for pain.  Patient also had an ultrasound of his scrotum and testicles that was negative.  CK, ESR and CRP pending.      Essential hypertension  Will continue home carvedilol 6.25 mg b.i.d..      Diabetes mellitus, type 2  Latest A1c recorded is 5.8 in 2019.  At home patient takes metformin extended release 500 mg b.i.d..  Will start low-dose sliding scale insulin while inpatient and adjust as needed.      VTE Risk Mitigation (From admission, onward)           Ordered     enoxaparin injection 100 mg  Every 12 hours (non-standard times)         02/09/23 1752     IP VTE LOW RISK PATIENT  Once         02/09/23 1544     Place sequential compression device  Until discontinued         02/09/23 1544                       Jane Santos DNP  Department of Hospital Medicine   [facility] ([address])

## 2023-02-10 NOTE — ASSESSMENT & PLAN NOTE
- Blood pressure still elevated  - Increased carvedilol 12.5 mg b.i.d..yesterday but still not controlled  - Will add amlodipine 5 mg p.o. daily today

## 2023-02-11 ENCOUNTER — HOSPITAL ENCOUNTER (OUTPATIENT)
Dept: CARDIOLOGY | Facility: OTHER | Age: 63
Discharge: HOME | End: 2023-02-11
Attending: HOSPITALIST
Payer: MEDICARE

## 2023-02-11 VITALS
BODY MASS INDEX: 27.59 KG/M2 | RESPIRATION RATE: 16 BRPM | WEIGHT: 215 LBS | DIASTOLIC BLOOD PRESSURE: 99 MMHG | TEMPERATURE: 98 F | OXYGEN SATURATION: 97 % | SYSTOLIC BLOOD PRESSURE: 164 MMHG | HEART RATE: 78 BPM | HEIGHT: 74 IN

## 2023-02-11 LAB
ALBUMIN SERPL BCP-MCNC: 3.6 G/DL (ref 3.5–5.2)
ALP SERPL-CCNC: 73 U/L (ref 55–135)
ALT SERPL W/O P-5'-P-CCNC: 10 U/L (ref 10–44)
ANION GAP SERPL CALC-SCNC: 11 MMOL/L (ref 8–16)
AST SERPL-CCNC: 13 U/L (ref 10–40)
BASOPHILS # BLD AUTO: 0.1 K/UL (ref 0–0.2)
BASOPHILS NFR BLD: 1.1 % (ref 0–1.9)
BILIRUB SERPL-MCNC: 0.5 MG/DL (ref 0.1–1)
BSA FOR ECHO PROCEDURE: 2.26 M2
BUN SERPL-MCNC: 15 MG/DL (ref 8–23)
CALCIUM SERPL-MCNC: 9.5 MG/DL (ref 8.7–10.5)
CHLORIDE SERPL-SCNC: 105 MMOL/L (ref 95–110)
CHOLEST SERPL-MCNC: 186 MG/DL (ref 120–199)
CHOLEST/HDLC SERPL: 4.7 {RATIO} (ref 2–5)
CO2 SERPL-SCNC: 21 MMOL/L (ref 23–29)
CREAT SERPL-MCNC: 0.9 MG/DL (ref 0.5–1.4)
DIFFERENTIAL METHOD: ABNORMAL
EOSINOPHIL # BLD AUTO: 0.3 K/UL (ref 0–0.5)
EOSINOPHIL NFR BLD: 3 % (ref 0–8)
ERYTHROCYTE [DISTWIDTH] IN BLOOD BY AUTOMATED COUNT: 14.7 % (ref 11.5–14.5)
EST. GFR  (NO RACE VARIABLE): >60 ML/MIN/1.73 M^2
GLUCOSE SERPL-MCNC: 121 MG/DL (ref 70–110)
HCT VFR BLD AUTO: 48.3 % (ref 40–54)
HDLC SERPL-MCNC: 40 MG/DL (ref 40–75)
HDLC SERPL: 21.5 % (ref 20–50)
HGB BLD-MCNC: 15.6 G/DL (ref 14–18)
IMM GRANULOCYTES # BLD AUTO: 0.02 K/UL (ref 0–0.04)
IMM GRANULOCYTES NFR BLD AUTO: 0.2 % (ref 0–0.5)
LDLC SERPL CALC-MCNC: 132.4 MG/DL (ref 63–159)
LYMPHOCYTES # BLD AUTO: 1.2 K/UL (ref 1–4.8)
LYMPHOCYTES NFR BLD: 13.5 % (ref 18–48)
MCH RBC QN AUTO: 25.9 PG (ref 27–31)
MCHC RBC AUTO-ENTMCNC: 32.3 G/DL (ref 32–36)
MCV RBC AUTO: 80 FL (ref 82–98)
MONOCYTES # BLD AUTO: 1.4 K/UL (ref 0.3–1)
MONOCYTES NFR BLD: 15.2 % (ref 4–15)
NEUTROPHILS # BLD AUTO: 6.1 K/UL (ref 1.8–7.7)
NEUTROPHILS NFR BLD: 67 % (ref 38–73)
NONHDLC SERPL-MCNC: 146 MG/DL
NRBC BLD-RTO: 0 /100 WBC
PLATELET # BLD AUTO: 226 K/UL (ref 150–450)
PMV BLD AUTO: 10.1 FL (ref 9.2–12.9)
POCT GLUCOSE: 117 MG/DL (ref 70–110)
POCT GLUCOSE: 122 MG/DL (ref 70–110)
POTASSIUM SERPL-SCNC: 4.1 MMOL/L (ref 3.5–5.1)
PROT SERPL-MCNC: 7.6 G/DL (ref 6–8.4)
RBC # BLD AUTO: 6.03 M/UL (ref 4.6–6.2)
SODIUM SERPL-SCNC: 137 MMOL/L (ref 136–145)
TRIGL SERPL-MCNC: 68 MG/DL (ref 30–150)
WBC # BLD AUTO: 9.05 K/UL (ref 3.9–12.7)

## 2023-02-11 PROCEDURE — 99239 PR HOSPITAL DISCHARGE DAY,>30 MIN: ICD-10-PCS | Mod: ,,, | Performed by: HOSPITALIST

## 2023-02-11 PROCEDURE — 25000003 PHARM REV CODE 250: Performed by: NURSE PRACTITIONER

## 2023-02-11 PROCEDURE — 93308 TTE F-UP OR LMTD: CPT | Mod: 26,,, | Performed by: INTERNAL MEDICINE

## 2023-02-11 PROCEDURE — 80053 COMPREHEN METABOLIC PANEL: CPT | Performed by: NURSE PRACTITIONER

## 2023-02-11 PROCEDURE — 80061 LIPID PANEL: CPT | Performed by: INTERNAL MEDICINE

## 2023-02-11 PROCEDURE — 85025 COMPLETE CBC W/AUTO DIFF WBC: CPT | Performed by: NURSE PRACTITIONER

## 2023-02-11 PROCEDURE — 99239 HOSP IP/OBS DSCHRG MGMT >30: CPT | Mod: ,,, | Performed by: HOSPITALIST

## 2023-02-11 PROCEDURE — 93308 TTE F-UP OR LMTD: CPT

## 2023-02-11 PROCEDURE — G0378 HOSPITAL OBSERVATION PER HR: HCPCS

## 2023-02-11 PROCEDURE — 25000003 PHARM REV CODE 250: Performed by: HOSPITALIST

## 2023-02-11 PROCEDURE — 93308 ECHO (CUPID ONLY): ICD-10-PCS | Mod: 26,,, | Performed by: INTERNAL MEDICINE

## 2023-02-11 PROCEDURE — 25000003 PHARM REV CODE 250: Performed by: INTERNAL MEDICINE

## 2023-02-11 RX ORDER — CARVEDILOL 12.5 MG/1
12.5 TABLET ORAL 2 TIMES DAILY WITH MEALS
Qty: 60 TABLET | Refills: 0 | Status: SHIPPED | OUTPATIENT
Start: 2023-02-11 | End: 2023-04-28

## 2023-02-11 RX ORDER — AMLODIPINE BESYLATE 5 MG/1
10 TABLET ORAL DAILY
Status: DISCONTINUED | OUTPATIENT
Start: 2023-02-11 | End: 2023-02-11 | Stop reason: HOSPADM

## 2023-02-11 RX ORDER — AMLODIPINE BESYLATE 10 MG/1
10 TABLET ORAL DAILY
Qty: 30 TABLET | Refills: 0 | Status: SHIPPED | OUTPATIENT
Start: 2023-02-12 | End: 2023-04-28

## 2023-02-11 RX ORDER — CLOPIDOGREL BISULFATE 75 MG/1
75 TABLET ORAL DAILY
Qty: 30 TABLET | Refills: 0 | Status: SHIPPED | OUTPATIENT
Start: 2023-02-12 | End: 2023-04-28

## 2023-02-11 RX ADMIN — CARVEDILOL 12.5 MG: 12.5 TABLET, FILM COATED ORAL at 09:02

## 2023-02-11 RX ADMIN — EZETIMIBE 10 MG: 10 TABLET ORAL at 09:02

## 2023-02-11 RX ADMIN — AMLODIPINE BESYLATE 10 MG: 5 TABLET ORAL at 09:02

## 2023-02-11 RX ADMIN — CLOPIDOGREL BISULFATE 300 MG: 300 TABLET, FILM COATED ORAL at 12:02

## 2023-02-11 RX ADMIN — HYDROCODONE BITARTRATE AND ACETAMINOPHEN 1 TABLET: 5; 325 TABLET ORAL at 03:02

## 2023-02-11 RX ADMIN — ASPIRIN 81 MG CHEWABLE TABLET 81 MG: 81 TABLET CHEWABLE at 09:02

## 2023-02-11 RX ADMIN — CLOPIDOGREL BISULFATE 75 MG: 75 TABLET ORAL at 09:02

## 2023-02-11 RX ADMIN — HYDROCODONE BITARTRATE AND ACETAMINOPHEN 1 TABLET: 5; 325 TABLET ORAL at 09:02

## 2023-02-11 NOTE — NURSING
Discharge instructions given to patient. Patient verbalizes understanding of instructions. IV site discontinued. Medications sent to pharmacy. Ambulating with patient to [facility] for discharge home.

## 2023-02-11 NOTE — PLAN OF CARE
Problem: Fall Injury Risk  Goal: Absence of Fall and Fall-Related Injury  Outcome: Ongoing, Progressing     Problem: Adult Inpatient Plan of Care  Goal: Plan of Care Review  Outcome: Ongoing, Progressing  Goal: Patient-Specific Goal (Individualized)  Outcome: Ongoing, Progressing  Goal: Absence of Hospital-Acquired Illness or Injury  Outcome: Ongoing, Progressing  Goal: Optimal Comfort and Wellbeing  Outcome: Ongoing, Progressing  Goal: Readiness for Transition of Care  Outcome: Ongoing, Progressing

## 2023-02-11 NOTE — PLAN OF CARE
02/11/23 1035   Final Note   Assessment Type Final Discharge Note   Anticipated Discharge Disposition Home   Hospital Resources/Appts/Education Provided Provided patient/caregiver with written discharge plan information;Appointments scheduled and added to AVS   Post-Acute Status   Discharge Delays None known at this time         Patient will discharge home with family.     Patient family will provide transportation home.     No discharge needs to address from a SW standpoint.     Patient will make his own PCP follow up Monday

## 2023-02-13 ENCOUNTER — TELEPHONE (OUTPATIENT)
Dept: CARDIOLOGY | Facility: CLINIC | Age: 63
End: 2023-02-13
Payer: MEDICARE

## 2023-02-13 NOTE — TELEPHONE ENCOUNTER
Returned call to patient. Scheduled hospital follow-up appointment with Dr Smith.    ----- Message from Paulette Kern sent at 2/11/2023 11:20 AM CST -----  Type:  Sooner Apoointment Request    Caller is requesting a sooner appointment.  Caller declined first available appointment listed below.  Caller will not accept being placed on the waitlist and is requesting a message be sent to doctor.  Name of Caller:Ezekiel  When is the first available appointment?None  Symptoms:Hospital F/U  Would the patient rather a call back or a response via [facility]? Call back  Best Call Back Number:[phone]  Additional Information: Needs to be seen with in a week

## 2023-02-15 LAB
BACTERIA BLD CULT: NORMAL

## 2023-02-16 ENCOUNTER — OFFICE VISIT (OUTPATIENT)
Dept: CARDIOLOGY | Facility: CLINIC | Age: 63
End: 2023-02-16
Payer: MEDICARE

## 2023-02-16 VITALS
WEIGHT: 216.69 LBS | BODY MASS INDEX: 27.82 KG/M2 | HEART RATE: 81 BPM | SYSTOLIC BLOOD PRESSURE: 144 MMHG | DIASTOLIC BLOOD PRESSURE: 96 MMHG | OXYGEN SATURATION: 97 %

## 2023-02-16 DIAGNOSIS — I50.32 HYPERTENSIVE HEART DISEASE WITH CHRONIC DIASTOLIC CONGESTIVE HEART FAILURE: ICD-10-CM

## 2023-02-16 DIAGNOSIS — N28.0: Primary | ICD-10-CM

## 2023-02-16 DIAGNOSIS — I11.0 HYPERTENSIVE HEART DISEASE WITH CHRONIC DIASTOLIC CONGESTIVE HEART FAILURE: ICD-10-CM

## 2023-02-16 DIAGNOSIS — I48.91 ATRIAL FIBRILLATION, UNSPECIFIED TYPE: ICD-10-CM

## 2023-02-16 PROBLEM — I50.33 HYPERTENSIVE HEART DISEASE WITH ACUTE ON CHRONIC DIASTOLIC CONGESTIVE HEART FAILURE: Status: ACTIVE | Noted: 2023-02-16

## 2023-02-16 PROCEDURE — 1159F MED LIST DOCD IN RCRD: CPT | Mod: CPTII,S$GLB,, | Performed by: INTERNAL MEDICINE

## 2023-02-16 PROCEDURE — 99999 PR PBB SHADOW E&M-EST. PATIENT-LVL III: ICD-10-PCS | Mod: PBBFAC,,, | Performed by: INTERNAL MEDICINE

## 2023-02-16 PROCEDURE — 3008F BODY MASS INDEX DOCD: CPT | Mod: CPTII,S$GLB,, | Performed by: INTERNAL MEDICINE

## 2023-02-16 PROCEDURE — 3077F PR MOST RECENT SYSTOLIC BLOOD PRESSURE >= 140 MM HG: ICD-10-PCS | Mod: CPTII,S$GLB,, | Performed by: INTERNAL MEDICINE

## 2023-02-16 PROCEDURE — 99214 PR OFFICE/OUTPT VISIT, EST, LEVL IV, 30-39 MIN: ICD-10-PCS | Mod: S$GLB,,, | Performed by: INTERNAL MEDICINE

## 2023-02-16 PROCEDURE — 3044F PR MOST RECENT HEMOGLOBIN A1C LEVEL <7.0%: ICD-10-PCS | Mod: CPTII,S$GLB,, | Performed by: INTERNAL MEDICINE

## 2023-02-16 PROCEDURE — 3008F PR BODY MASS INDEX (BMI) DOCUMENTED: ICD-10-PCS | Mod: CPTII,S$GLB,, | Performed by: INTERNAL MEDICINE

## 2023-02-16 PROCEDURE — 3044F HG A1C LEVEL LT 7.0%: CPT | Mod: CPTII,S$GLB,, | Performed by: INTERNAL MEDICINE

## 2023-02-16 PROCEDURE — 99999 PR PBB SHADOW E&M-EST. PATIENT-LVL III: CPT | Mod: PBBFAC,,, | Performed by: INTERNAL MEDICINE

## 2023-02-16 PROCEDURE — 3077F SYST BP >= 140 MM HG: CPT | Mod: CPTII,S$GLB,, | Performed by: INTERNAL MEDICINE

## 2023-02-16 PROCEDURE — 1160F RVW MEDS BY RX/DR IN RCRD: CPT | Mod: CPTII,S$GLB,, | Performed by: INTERNAL MEDICINE

## 2023-02-16 PROCEDURE — 3080F PR MOST RECENT DIASTOLIC BLOOD PRESSURE >= 90 MM HG: ICD-10-PCS | Mod: CPTII,S$GLB,, | Performed by: INTERNAL MEDICINE

## 2023-02-16 PROCEDURE — 1159F PR MEDICATION LIST DOCUMENTED IN MEDICAL RECORD: ICD-10-PCS | Mod: CPTII,S$GLB,, | Performed by: INTERNAL MEDICINE

## 2023-02-16 PROCEDURE — 3080F DIAST BP >= 90 MM HG: CPT | Mod: CPTII,S$GLB,, | Performed by: INTERNAL MEDICINE

## 2023-02-16 PROCEDURE — 99214 OFFICE O/P EST MOD 30 MIN: CPT | Mod: S$GLB,,, | Performed by: INTERNAL MEDICINE

## 2023-02-16 PROCEDURE — 1160F PR REVIEW ALL MEDS BY PRESCRIBER/CLIN PHARMACIST DOCUMENTED: ICD-10-PCS | Mod: CPTII,S$GLB,, | Performed by: INTERNAL MEDICINE

## 2023-02-16 RX ORDER — ASPIRIN 81 MG/1
81 TABLET ORAL DAILY
COMMUNITY

## 2023-02-16 NOTE — PROGRESS NOTES
[facility]    Chief Complaint  Chief Complaint   Patient presents with    Follow-up       HPI:    Seen in the hospital last week when he was admitted for left flank pain.  Presumptive diagnosis was a renal infarct.  CT scan had shown multiple defects in both kidneys.  Pain continues to come and go.  He describes his left flank, inguinal, and hip pain.  No specific aggravating or alleviating factors have been noted.  Medications changed by his PCP yesterday but he does not know the names.  He will call back with that information.    Medications  Current Outpatient Medications   Medication Sig Dispense Refill    amLODIPine (NORVASC) 10 MG tablet Take 1 tablet (10 mg total) by mouth once daily. 30 tablet 0    aspirin (ADULT LOW DOSE ASPIRIN) 81 MG EC tablet Take 81 mg by mouth once daily.      carvediloL (COREG) 12.5 MG tablet Take 1 tablet (12.5 mg total) by mouth 2 (two) times daily with meals. 60 tablet 0    clopidogreL (PLAVIX) 75 mg tablet Take 1 tablet (75 mg total) by mouth once daily. 30 tablet 0    ezetimibe (ZETIA) 10 mg tablet Take 10 mg by mouth once daily.      HYDROcodone-acetaminophen (NORCO)  mg per tablet Take 1 tablet by mouth after meals as needed.      meloxicam (MOBIC) 15 MG tablet Take 1 tablet (15 mg total) by mouth once daily. 10 tablet 0    metFORMIN (GLUCOPHAGE-XR) 500 MG ER 24hr tablet Take 500 mg by mouth 2 (two) times daily with meals.      tamsulosin (FLOMAX) 0.4 mg Cap Take 1 capsule by mouth once daily.       No current facility-administered medications for this visit.        History  Past Medical History:   Diagnosis Date    Diabetes mellitus     Gout     Hepatitis C     Hypercholesteremia     Hypertension      Past Surgical History:   Procedure Laterality Date    ARTHROPLASTY, KNEE, TOTAL, SIGHT ASSISTED Left 7/26/2021    Procedure: ARTHROPLASTY, KNEE, TOTAL, SIGHT ASSISTED;  Surgeon: Jan Rust MD;  Location: [facility];  Service: Orthopedics;  Laterality:  Left;    BACK SURGERY      CORONARY ANGIOGRAPHY  08/24/2016    no CAD    FRACTURE SURGERY Left     leg    FUSION OF SPINE WITH INSTRUMENTATION N/A 12/20/2018    Procedure: FUSION, SPINE, WITH INSTRUMENTATION Removal of spinal hardware, Bilateral Jamison-White Osteotomy L5-S1, L5-S1 Interbody Arthrodesis, L4-S1 Segmental Instrumentation with Posterior Lateral Arthrodesis;  Surgeon: Ezequiel Pedroza MD;  Location: [facility] OR;  Service: Neurosurgery;  Laterality: N/A;  Procedure: Removal of spinal hardware, Revision of Posterolateral fusion from L3-S1, L5-S1 In    HIP SURGERY Right     hip fusion    INJECTION OF STEROID Right 3/22/2019    Procedure: INJECTION, STEROID Right SI Joint Block and Steroid Injection;  Surgeon: Ezequiel Pedroza MD;  Location: [facility] OR;  Service: Neurosurgery;  Laterality: Right;  Procedure: Right SI Joint Block and Steroid Injection  Surgery Time: 15 Min  LOS:0  Anesthesia: General  Radiology: C-Arm  Bed: Regular Bed  Position: Prone    KNEE ARTHROSCOPY      LAMINECTOMY      Lumbar    MYELOGRAPHY N/A 11/5/2018    Procedure: Myelogram;  Surgeon: [facility] Provider;  Location: [address];  Service: Radiology;  Laterality: N/A;    right wrist surgery      TRIAL OF SPINAL CORD NERVE STIMULATOR N/A 9/20/2019    Procedure: Trial, Neurostimulator, Spinal Cord Spinal Cord Stimulator Trial using 2 Percutaneous Least at T9-10;  Surgeon: Ezequiel Pedroza MD;  Location: [facility] OR [address] [place] [address];  Service: Neurosurgery;  Laterality: N/A;  Procedure: Spinal Cord Stimulator Trial using 2 Percutaneous Least at T9-10  Surgery Time: 1 Hr  LOS: 0  Anesthesia: General  Radiology: C-arm  Bed: Gill 4 Poster  Position: Prone  Eq     Social History     Socioeconomic History    Marital status:    Tobacco Use    Smoking status: Former     Passive exposure: Never    Smokeless tobacco: Never    Tobacco comments:     quit over 30yrs   Substance and Sexual Activity    Alcohol use: No    Drug use: Yes     Types:  "Marijuana     Comment: daily     Social Determinants of Health     Financial Resource Strain: Low Risk     Difficulty of Paying Living Expenses: Not hard at all   Food Insecurity: No Food Insecurity    Worried About Running Out of Food in the Last Year: Never true    Ran Out of Food in the Last Year: Never true   Transportation Needs: No Transportation Needs    Lack of Transportation (Medical): No    Lack of Transportation (Non-Medical): No   Physical Activity: Inactive    Days of Exercise per Week: 0 days    Minutes of Exercise per Session: 0 min   Stress: No Stress Concern Present    Feeling of Stress : Not at all   Social Connections: Socially Isolated    Frequency of Communication with Friends and Family: More than three times a week    Frequency of Social Gatherings with Friends and Family: Three times a week    Attends Nondenominational Services: Never    Active Member of Clubs or Organizations: No    Attends Club or Organization Meetings: Never    Marital Status:    Housing Stability: Unknown    Unable to Pay for Housing in the Last Year: No    Unstable Housing in the Last Year: No     Family History   Problem Relation Age of Onset    Diabetes Sister     Hypertension Sister     Diabetes Brother     Hypertension Brother     Melanoma Neg Hx     Psoriasis Neg Hx     Lupus Neg Hx         Allergies  Review of patient's allergies indicates:   Allergen Reactions    Lactose     Gabapentin Other (See Comments)     Patient states, "it makes me bleed from my rectum. Don't give it to me."    Atorvastatin Rash     Dye used    Opioids - morphine analogues Rash    Penicillins Rash     Pt states a "a couple of years ago" he took penicillin and broke out in "red spots" with no itching or difficulty breathing       Review of Systems   Review of Systems   Constitutional: Negative for malaise/fatigue, weight gain and weight loss.   Eyes:  Negative for visual disturbance.   Cardiovascular:  Negative for chest pain, claudication, " cyanosis, dyspnea on exertion, irregular heartbeat, leg swelling, near-syncope, orthopnea, palpitations, paroxysmal nocturnal dyspnea and syncope.   Respiratory:  Negative for cough, hemoptysis, shortness of breath, sleep disturbances due to breathing and wheezing.    Hematologic/Lymphatic: Negative for bleeding problem. Does not bruise/bleed easily.   Skin:  Negative for poor wound healing.   Musculoskeletal:  Negative for muscle cramps and myalgias.   Gastrointestinal:  Negative for abdominal pain, anorexia, diarrhea, heartburn, hematemesis, hematochezia, melena, nausea and vomiting.   Genitourinary:  Positive for flank pain. Negative for hematuria and nocturia.   Neurological:  Negative for excessive daytime sleepiness, dizziness, focal weakness, light-headedness and weakness.     Physical Exam  Vitals:    02/16/23 1102   BP: (!) 144/96   Pulse: 81     Wt Readings from Last 1 Encounters:   02/16/23 98.3 kg (216 lb 11.2 oz)     Physical Exam  Constitutional:       General: He is not in acute distress.     Appearance: He is not toxic-appearing or diaphoretic.   HENT:      Head: Normocephalic and atraumatic.   Eyes:      General: No scleral icterus.     Conjunctiva/sclera: Conjunctivae normal.   Neck:      Thyroid: No thyromegaly.      Vascular: No carotid bruit, hepatojugular reflux or JVD.      Trachea: No tracheal deviation.   Cardiovascular:      Rate and Rhythm: Normal rate and regular rhythm. No extrasystoles are present.     Chest Wall: PMI is not displaced.      Pulses:           Carotid pulses are 2+ on the right side and 2+ on the left side.       Radial pulses are 2+ on the right side and 2+ on the left side.        Dorsalis pedis pulses are 2+ on the right side and 2+ on the left side.        Posterior tibial pulses are 2+ on the right side and 2+ on the left side.      Heart sounds: S1 normal and S2 normal. No murmur heard.    No S3 or S4 sounds.   Pulmonary:      Effort: No accessory muscle usage or  respiratory distress.      Breath sounds: No decreased breath sounds, wheezing, rhonchi or rales.   Abdominal:      General: Bowel sounds are normal.      Palpations: Abdomen is soft.      Tenderness: There is no abdominal tenderness.   Musculoskeletal:         General: No tenderness or deformity.      Cervical back: Neck supple.      Right lower leg: No edema.      Left lower leg: No edema.   Skin:     General: Skin is warm and dry.      Coloration: Skin is not pale.      Nails: There is no clubbing.      Comments: No signs of embolic events.   Neurological:      Mental Status: He is alert and oriented to person, place, and time.      Cranial Nerves: No cranial nerve deficit.      Sensory: No sensory deficit.   Psychiatric:         Speech: Speech normal.         Behavior: Behavior normal. Behavior is cooperative.       Labs  No results displayed because visit has over 200 results.      Admission on 02/07/2023, Discharged on 02/07/2023   Component Date Value Ref Range Status    POCT Glucose 02/07/2023 171 (H)  70 - 110 mg/dL Final    Specimen UA 02/07/2023 Urine, Clean Catch   Final    Color, UA 02/07/2023 Yellow  Yellow, Straw, Doris Final    Appearance, UA 02/07/2023 Clear  Clear Final    pH, UA 02/07/2023 6.0  5.0 - 8.0 Final    Specific Gravity, UA 02/07/2023 1.030  1.005 - 1.030 Final    Protein, UA 02/07/2023 Trace (A)  Negative Final    Comment: Recommend a 24 hour urine protein or a urine   protein/creatinine ratio if globulin induced proteinuria is  clinically suspected.      Glucose, UA 02/07/2023 Negative  Negative Final    Ketones, UA 02/07/2023 Trace (A)  Negative Final    Bilirubin (UA) 02/07/2023 Negative  Negative Final    Occult Blood UA 02/07/2023 Negative  Negative Final    Nitrite, UA 02/07/2023 Negative  Negative Final    Urobilinogen, UA 02/07/2023 Negative  Negative EU/dL Final    Leukocytes, UA 02/07/2023 Negative  Negative Final       Imaging  X-Ray Lumbar Spine Ap And Lateral    Result  Date: 2/7/2023  EXAMINATION: XR LUMBAR SPINE AP AND LATERAL CLINICAL HISTORY: low back pain; TECHNIQUE: AP, lateral and spot images were performed of the lumbar spine. COMPARISON: 11/13/2019.  Scoliosis radiographs from 08/31/2022. FINDINGS: There is L4 through S1 posterior instrumented spinal fusion with an L5-S1 intervertebral disc spacer.  Hardware is intact and well aligned on this study.  Partially imaged hardware overlies the spot view.  There is moderate disc height loss at L2-L3 with adjacent endplate sclerosis, significantly progressed in comparison with 11/13/2019, unchanged from 08/31/2022.  There is minimal retrolisthesis of L2 on L3.  Alignment is otherwise unremarkable.  Remaining visualized osseous structures are intact.     No acute fracture or subluxation of the lumbar spine. Postoperative changes of L4 through S1 posterior spinal fusion. Degenerative changes at L2-L3 as above. Electronically signed by: Rg Sims Date:    02/07/2023 Time:    20:45    CTA Chest Non-Coronary (PE Studies)    Result Date: 2/11/2023  EXAMINATION: CTA CHEST NON CORONARY (PE STUDIES) CLINICAL HISTORY: Pulmonary embolism (PE) suspected, positive D-dimer; TECHNIQUE: Low dose axial CT images were obtained throughout the region of the chest during the IV bolus administration of 100 mL of Omnipaque 350.  Contrast timing was optimized to evaluate the pulmonary arteries.  Multiple MIP images were performed. COMPARISON: 08/26/2021 FINDINGS: No intraluminal filling defects identified to suggest the presence of a pulmonary embolism. The soft tissue structures at the base of the neck appear unremarkable. Trachea and proximal airways are clear. Lungs are symmetrically expanded.  No focal consolidation.  Atelectatic changes in the dependent aspect of the right lung. The heart and aorta demonstrates no significant abnormality on non gated exam. No pleural fluid or pericardial fluid. Prominent mediastinal and hilar lymph nodes. The  upper abdomen demonstrates no acute abnormality. Mild degenerative changes, but no acute fracture or osseous destructive process. Extrathoracic soft tissues are unremarkable.     No evidence of pulmonary embolus. Atelectatic changes in dependent aspect of the right lung. Multiple prominent mediastinal and hilar lymph nodes, which are new from prior exam.  Clinical correlation advised. Electronically signed by: Darren Addison MD Date:    02/11/2023 Time:    08:02    US Scrotum And Testicles    Result Date: 2/9/2023  EXAMINATION: US SCROTUM AND TESTICLES CLINICAL HISTORY: Testicular pain, unspecified TECHNIQUE: Sonography of the scrotum and testes. COMPARISON: None. FINDINGS: Right Testicle: *Size: 4.8 x 3 x 3.3 cm *Appearance: Normal. *Flow: Normal arterial and venous flow *Epididymis: Normal. *Hydrocele: None. *Varicocele: None. . Left Testicle: *Size: 4.2 x 3.1 x 3 cm *Appearance: Normal. *Flow: Normal arterial and venous flow *Epididymis: Normal. *Hydrocele: None. *Varicocele: None. . Other findings: None.     No significant abnormality. Electronically signed by: Ashley Montes Date:    02/09/2023 Time:    11:14    CT Abdomen Pelvis With Contrast    Result Date: 2/9/2023  EXAMINATION: CT ABDOMEN PELVIS WITH CONTRAST CLINICAL HISTORY: Abdominal pain, acute, nonlocalized; TECHNIQUE: Low dose axial images, sagittal and coronal reformations were obtained from the lung bases to the pubic symphysis following the IV administration of 100 mL of Omnipaque 350 .  Oral contrast was not given. COMPARISON: Scrotal ultrasound same day, CT abdomen and pelvis 07/01/2021, right upper quadrant ultrasound 09/28/2020, renal ultrasound 09/27/2016 FINDINGS: Imaged lung bases show scattered areas of platelike scarring versus atelectasis and minimal dependent atelectasis without consolidation or pleural effusion.  Base of the heart is normal in size without significant pericardial fluid. Liver, gallbladder, pancreas, spleen, stomach,  duodenum and bilateral adrenal glands are within normal limits.  No biliary ductal dilatation. Bilateral kidneys are normal in size, shape and location.  Multifocal subcentimeter hypodense cortical foci noted throughout each kidney, some of which appear wedge-shaped, and all are new from 07/01/2021 study.  No hydronephrosis or significant perinephric stranding.  Ureters are nondilated.  Urinary bladder well distended without wall thickening.  Prostate and seminal vesicles are within normal limits. Appendix and terminal ileum are within normal limits.  Moderate amount of stool noted throughout the colon.  Numerous scattered colonic diverticula without convincing evidence of acute diverticulitis.  No evidence of bowel obstruction or acute inflammation.  No pneumatosis or portal venous gas. Scattered subcentimeter lymph nodes throughout the abdomen and pelvis and at the bilateral inguinal regions similar to prior, none pathologically enlarged by CT criteria.  No ascites or free air.  No significant atherosclerosis.  No aortic aneurysm or dissection. Small fat containing umbilical hernia. There is redemonstrated osseous fusion of the right hip and also lumbar spine, with hardware present at the lumbar spine, right hip and proximal femur and associated beam hardening with streak artifact.  Osseous structures appear grossly stable without acute or destructive process seen.     1. Bilateral renal new multifocal subcentimeter hypoattenuating cortical foci, some of which demonstrate wedge-shaped configuration concerning for multifocal infarcts potentially from embolic etiology.  Sequela of pyelonephritis not excluded.  Clinical correlation and with urinalysis advised.  No hydronephrosis. 2. Diverticulosis coli without diverticulitis. 3. Grossly stable additional findings as above. This report was flagged in Epic as abnormal. Electronically signed by: Adarsh Baires MD Date:    02/09/2023 Time:    14:20     Lower Extremity  Veins Bilateral    Result Date: 2/9/2023  EXAMINATION: US LOWER EXTREMITY VEINS BILATERAL CLINICAL HISTORY: r/o dvt; TECHNIQUE: Duplex and color flow Doppler evaluation of the bilateral lower extremity veins was performed. COMPARISON: August 2021. FINDINGS: No evidence of clot involving the bilateral common femoral, greater saphenous, femoral, popliteal, peroneal, anterior and posterior tibial veins.  All venous structures demonstrate normal respiratory phasicity and augment adequately.  No evidence of soft tissue mass or Baker's cyst.     No evidence of lower extremity deep venous thrombosis. Electronically signed by: Ronna Echeverria MD Date:    02/09/2023 Time:    19:35    Echo Saline Bubble? Yes    Result Date: 2/11/2023  · Limited echo for bubble study. · There is no evidence of intracardiac shunting.      Echo Saline Bubble? Yes    Result Date: 2/10/2023  · The left ventricle is normal in size with normal systolic function. · Grade I left ventricular diastolic dysfunction. · Normal right ventricular size with normal right ventricular systolic function.      US Doppler Renal Artery and Vein (xpd)    Result Date: 2/10/2023  EXAMINATION: US DOPPLER RENAL ARTERY AND VEIN (XPD) CLINICAL HISTORY: Renal infarct; TECHNIQUE: Ultrasound Doppler renal artery stenosis.  Color and spectral Doppler were performed COMPARISON: None. FINDINGS: RIGHT: The right kidney measures 10.8cm and is unremarkable.  No right-sided hydronephrosis. The right renal vein is unremarkable. Right renal resistive indices: Upper: 0.62 Mid: 0.58 Lower: 0.62 Main right renal artery velocity: 45 cm/sec LEFT: The left kidney measures 10.6cm and is unremarkable.  No left-sided hydronephrosis. The left renal vein is unremarkable. Left renal resistive indices: Upper: 0.61 Mid: 0.57 Lower: 0.59 Left renal artery velocity: 43 cm/sec     Unremarkable renal ultrasound with Doppler.  The multifocal hypoattenuating cortical foci identified on recent CT are not  evident to me by ultrasound. Electronically signed by: Ashley Montes Date:    02/10/2023 Time:    15:58      Assessment  1. Renal embolism  I am still skeptical about this diagnosis given unilateral pain but bilateral renal findings.  And, no evidence of embolization in any other vascular bed.  - Cardiac event monitor; Future    2. Atrial fibrillation, unspecified type  Needs to be considered as a possible etiology if he indeed had renal emboli  - Cardiac event monitor; Future    3. Hypertensive heart disease with chronic diastolic congestive heart failure  Compensated      Plan and Discussion    He is going to call back with his recent medication changes.  I do not see an indication to start oral anticoagulation at this point.  So instead will continue dual antiplatelet therapy.    The 10-year ASCVD risk score (Fombell DK, et al., 2019) is: 32.3%    Values used to calculate the score:      Age: 62 years      Sex: Male      Is Non- : Yes      Diabetic: Yes      Tobacco smoker: No      Systolic Blood Pressure: 144 mmHg      Is BP treated: Yes      HDL Cholesterol: 40 mg/dL      Total Cholesterol: 186 mg/dL     Follow Up  Follow up in about 6 weeks (around 3/30/2023).      Elan Smith MD

## 2023-02-21 NOTE — DISCHARGE SUMMARY
[facility] Surg ([address] Medicine  Discharge Summary      Patient Name: Ezekiel Noyola  MRN: [id number]  [facility]: [id number]  Patient Class: OP- Observation  Admission Date: 2/9/2023  Hospital Length of Stay: 0 days  Discharge Date and Time: 2/11/2023 12:33 PM  Attending Physician: Renetta Penny MD  Discharging Provider: Renetta Penny MD  Primary Care Provider: Dionisio Avila MD    Primary Care Team: Networked reference to record PCT     HPI:   Mr. Falcon is a 62-year-old male with a past medical history that includes hypertension, diabetes mellitus type 2, untreated hepatitis-C.  He also has had a lumbar fusion in the past and has chronic back pain along with neuropathy of his bilateral feet.  Patient came to the emergency department today for pain that began 3 days ago in his lower abdomen and has begun to radiate towards his right groin and around his left flank.  Patient states he was in the middle of doing his laundry and had an acute onset of this pain.  He went to the emergency department at [facility] and they sent him home on ibuprofen.  Patient denies any trauma or dysuria.  The pain is worse with tactile touch.  Patient denies chest pain and shortness of breath.  He is prescribed Norco at home for his back pain.  This medication did not help control his new flank, abdominal, scrotal pain.  In the emergency department patient had a temperature of 99.2°, white blood cell count of 10.63, creatinine of 0.9, and a GFR greater than 60.  He had an ultrasound of his scrotum and testicles which did not identify any significant abnormalities.  Patient had a CT was abdomen pelvis with contrast the reports new bilateral hypo attenuations that appear wedge-shaped configuration in could indicate emboli.  There is no hydronephrosis.  Patient admitted to hospital medicine for further management.      * No surgery found *      Hospital Course:   Mr. Noyola presented with left flank and groin pain.  Placed in  observation status.  Workup in the ER with CT of the abdomen pelvis with contrast remarkable for bilateral renal infarcts concerning for embolic phenomenon.  D-dimer elevated 0.55.  Anticoagulated with full-dose Lovenox empirically.   Cardiology and Urology consulted. Ultrasound of lower extremities negative for DVT and CTA of the chest negative for PE.  Echo with normal systolic function, normal left ventricle size, grade 1 diastolic dysfunction and negative for shunting.  No ectopy noted on telemetry.  No clear evidence for clots despite suspicion for embolic phenomenon.  Given no thrombus found, cardiology recommended treatment with dual anti-platelet therapy but not full anticoagulation.  Urology had no recommendations given no hydronephrosis, masses or stones visualized and UA was unremarkable.  Flank pain resolved.  Patient discharged on aspirin and Plavix with follow-up with Cardiology.    * Renal infarct  Flank pain  - Patient with bilateral renal infarct found on CT abdomen pelvis which was concerning for embolic phenomenon   - Patient denies any family history of hypercoagulable states.  He has had previous elevated D-dimers in his chart.  Denies any blood clots or pulmonary emboli.  Patient denies any trauma to his torso.  Patient reports he has had episodes of shortness of breath for the past 6 months.  Patient denies IV drug use.  Patient denies a history of atrial fibrillation.  Patient in normal sinus rhythm on EKG.   - Low suspicion for infectious etiology that can result in possible septic emboli at this time.  No source of infection, he was afebrile and with a normal WBC.  Blood culture are NGTD.  - Ultrasound negative for DVT and ultrasound of his scrotum and testicles negative.   - Further workup with CTA of chest negative for PE   - Workup with Echo with bubble study negative for shunting, normal EF   - Continue to monitor on telemetry to assess for ectopy  - Treated initially with full-dose  Lovenox b.i.d.   - P.r.n. analgesics ordered for pain  - Cardiology and Urology consulted. No needs for intervention as per Urology.   - Cardiology recommended ASA and plavix given no thrombus found and full anticoagulation stopped  - Patient discharged on dual antiplatelet therapy and follow up with Cardiology     Hyperlipidemia  - Continued ezetimibe 10 mg p.o. daily     Diabetes mellitus, type 2  - Latest A1c recorded is 5.8 in 2019, and repeat hemoglobin A1c on this admission 5.7 consistent with good control  - At home patient takes metformin extended release 500 mg b.i.d. which will be held during hospitalization   - Glucose levels were reviewed, well controlled  - Continue low-dose sliding scale insulin while inpatient and adjust as needed to achieve glucose between 140-180 during hospital stay  - Resumed on home regimen on discharge     Essential hypertension  - Blood pressure still elevated  - Increased carvedilol 12.5 mg b.i.d..yesterday but still not controlled  - Added amlodipine 5 mg p.o. daily and then increased to 10 mg daily     DDD (degenerative disc disease), lumbosacral  - Patient chronic lower back pain and previous history of spinal fusion  - Report pain was consistent with his baseline levels  - PRN analgesics          Goals of Care Treatment Preferences:  Code Status: Full Code      Consults:   Consults (From admission, onward)        Status Ordering Provider     Inpatient consult to Urology  Once        Provider:  (Not yet assigned)    Completed RADU ROBINS     Inpatient consult to Cardiology  Once        Provider:  (Not yet assigned)    Completed RADU ROBINS          Final Active Diagnoses:    Diagnosis Date Noted POA    PRINCIPAL PROBLEM:  Renal infarct [N28.0] 02/09/2023 Yes    Hyperlipidemia [E78.5] 02/10/2023 Yes    Diabetes mellitus, type 2 [E11.9] 02/01/2021 Yes    Essential hypertension [I10] 02/01/2021 Yes    DDD (degenerative disc disease), lumbosacral [M51.37] 05/30/2014  Yes      Problems Resolved During this Admission:       Discharged Condition: good    Disposition: Home or Self Care    Follow Up:   Follow-up Information     Dionisio Avila MD Follow up in 1 week(s).    Specialty: Internal Medicine  Contact information:  [address]  [facility] [id number] [phone]             Elan Smith MD Follow up in 1 week(s).    Specialties: Cardiovascular Disease, Cardiology, Interventional Cardiology  Contact information:  [address]  [address]  [facility] [id number] [phone]                       Patient Instructions:      Diet Cardiac     Activity as tolerated       Significant Diagnostic Studies:     Pending Diagnostic Studies:     None         Medications:  Reconciled Home Medications:      Medication List      START taking these medications    amLODIPine 10 MG tablet  Commonly known as: NORVASC  Take 1 tablet (10 mg total) by mouth once daily.     clopidogreL 75 mg tablet  Commonly known as: PLAVIX  Take 1 tablet (75 mg total) by mouth once daily.        CHANGE how you take these medications    carvediloL 12.5 MG tablet  Commonly known as: COREG  Take 1 tablet (12.5 mg total) by mouth 2 (two) times daily with meals.  What changed:   · medication strength  · how much to take        CONTINUE taking these medications    ezetimibe 10 mg tablet  Commonly known as: ZETIA  Take 10 mg by mouth once daily.     HYDROcodone-acetaminophen  mg per tablet  Commonly known as: NORCO  Take 1 tablet by mouth after meals as needed.     meloxicam 15 MG tablet  Commonly known as: MOBIC  Take 1 tablet (15 mg total) by mouth once daily.     metFORMIN 500 MG ER 24hr tablet  Commonly known as: GLUCOPHAGE-XR  Take 500 mg by mouth 2 (two) times daily with meals.     tamsulosin 0.4 mg Cap  Commonly known as: FLOMAX  Take 1 capsule by mouth once daily.        ASK your doctor about these medications    aspirin 81 MG Chew  Take 1 tablet (81 mg total) by mouth 2 (two) times  daily.            Indwelling Lines/Drains at time of discharge:   Lines/Drains/Airways     None                 Time spent on the discharge of patient: 35 minutes         Renetta Penny MD  Department of Hospital Medicine  [facility] ([address])

## 2023-03-09 ENCOUNTER — CLINICAL SUPPORT (OUTPATIENT)
Dept: CARDIOLOGY | Facility: HOSPITAL | Age: 63
End: 2023-03-09
Attending: INTERNAL MEDICINE
Payer: MEDICARE

## 2023-03-09 DIAGNOSIS — I48.91 ATRIAL FIBRILLATION, UNSPECIFIED TYPE: ICD-10-CM

## 2023-03-09 DIAGNOSIS — N28.0: ICD-10-CM

## 2023-03-09 PROCEDURE — 93270 REMOTE 30 DAY ECG REV/REPORT: CPT

## 2023-03-09 PROCEDURE — 93272 ECG/REVIEW INTERPRET ONLY: CPT | Mod: ,,, | Performed by: INTERNAL MEDICINE

## 2023-03-09 PROCEDURE — 93272 CARDIAC EVENT MONITOR (CUPID ONLY): ICD-10-PCS | Mod: ,,, | Performed by: INTERNAL MEDICINE

## 2023-03-14 ENCOUNTER — HOSPITAL ENCOUNTER (EMERGENCY)
Facility: OTHER | Age: 63
Discharge: HOME | End: 2023-03-14
Attending: EMERGENCY MEDICINE
Payer: MEDICARE

## 2023-03-14 VITALS
BODY MASS INDEX: 27.59 KG/M2 | SYSTOLIC BLOOD PRESSURE: 163 MMHG | DIASTOLIC BLOOD PRESSURE: 97 MMHG | WEIGHT: 215 LBS | OXYGEN SATURATION: 95 % | RESPIRATION RATE: 16 BRPM | HEART RATE: 72 BPM | HEIGHT: 74 IN | TEMPERATURE: 98 F

## 2023-03-14 DIAGNOSIS — N50.811 RIGHT TESTICULAR PAIN: ICD-10-CM

## 2023-03-14 DIAGNOSIS — N45.1 RIGHT EPIDIDYMITIS: Primary | ICD-10-CM

## 2023-03-14 LAB
ALBUMIN SERPL BCP-MCNC: 4.1 G/DL (ref 3.5–5.2)
ALP SERPL-CCNC: 81 U/L (ref 55–135)
ALT SERPL W/O P-5'-P-CCNC: 15 U/L (ref 10–44)
ANION GAP SERPL CALC-SCNC: 10 MMOL/L (ref 8–16)
AST SERPL-CCNC: 15 U/L (ref 10–40)
BASOPHILS # BLD AUTO: 0.12 K/UL (ref 0–0.2)
BASOPHILS NFR BLD: 1.1 % (ref 0–1.9)
BILIRUB SERPL-MCNC: 0.4 MG/DL (ref 0.1–1)
BILIRUB UR QL STRIP: NEGATIVE
BUN SERPL-MCNC: 14 MG/DL (ref 8–23)
CALCIUM SERPL-MCNC: 10.2 MG/DL (ref 8.7–10.5)
CHLORIDE SERPL-SCNC: 100 MMOL/L (ref 95–110)
CLARITY UR: CLEAR
CO2 SERPL-SCNC: 27 MMOL/L (ref 23–29)
COLOR UR: YELLOW
CREAT SERPL-MCNC: 1.3 MG/DL (ref 0.5–1.4)
DIFFERENTIAL METHOD: ABNORMAL
EOSINOPHIL # BLD AUTO: 0.1 K/UL (ref 0–0.5)
EOSINOPHIL NFR BLD: 0.7 % (ref 0–8)
ERYTHROCYTE [DISTWIDTH] IN BLOOD BY AUTOMATED COUNT: 14.8 % (ref 11.5–14.5)
EST. GFR  (NO RACE VARIABLE): >60 ML/MIN/1.73 M^2
GLUCOSE SERPL-MCNC: 103 MG/DL (ref 70–110)
GLUCOSE UR QL STRIP: NEGATIVE
HCT VFR BLD AUTO: 49.4 % (ref 40–54)
HGB BLD-MCNC: 16.4 G/DL (ref 14–18)
HGB UR QL STRIP: NEGATIVE
IMM GRANULOCYTES # BLD AUTO: 0.03 K/UL (ref 0–0.04)
IMM GRANULOCYTES NFR BLD AUTO: 0.3 % (ref 0–0.5)
INR PPP: 0.9 (ref 0.8–1.2)
KETONES UR QL STRIP: ABNORMAL
LACTATE SERPL-SCNC: 1 MMOL/L (ref 0.5–2.2)
LACTATE SERPL-SCNC: 3.5 MMOL/L (ref 0.5–2.2)
LEUKOCYTE ESTERASE UR QL STRIP: NEGATIVE
LYMPHOCYTES # BLD AUTO: 1.3 K/UL (ref 1–4.8)
LYMPHOCYTES NFR BLD: 12.2 % (ref 18–48)
MCH RBC QN AUTO: 26.4 PG (ref 27–31)
MCHC RBC AUTO-ENTMCNC: 33.2 G/DL (ref 32–36)
MCV RBC AUTO: 79 FL (ref 82–98)
MONOCYTES # BLD AUTO: 0.9 K/UL (ref 0.3–1)
MONOCYTES NFR BLD: 8.7 % (ref 4–15)
NEUTROPHILS # BLD AUTO: 8.1 K/UL (ref 1.8–7.7)
NEUTROPHILS NFR BLD: 77 % (ref 38–73)
NITRITE UR QL STRIP: NEGATIVE
NRBC BLD-RTO: 0 /100 WBC
PH UR STRIP: 8 [PH] (ref 5–8)
PLATELET # BLD AUTO: 239 K/UL (ref 150–450)
PMV BLD AUTO: 9.7 FL (ref 9.2–12.9)
POTASSIUM SERPL-SCNC: 4.5 MMOL/L (ref 3.5–5.1)
PROT SERPL-MCNC: 8.5 G/DL (ref 6–8.4)
PROT UR QL STRIP: NEGATIVE
PROTHROMBIN TIME: 10.3 SEC (ref 9–12.5)
RBC # BLD AUTO: 6.22 M/UL (ref 4.6–6.2)
SODIUM SERPL-SCNC: 137 MMOL/L (ref 136–145)
SP GR UR STRIP: 1.01 (ref 1–1.03)
URN SPEC COLLECT METH UR: ABNORMAL
UROBILINOGEN UR STRIP-ACNC: NEGATIVE EU/DL
WBC # BLD AUTO: 10.57 K/UL (ref 3.9–12.7)

## 2023-03-14 PROCEDURE — 99285 EMERGENCY DEPT VISIT HI MDM: CPT | Mod: 25

## 2023-03-14 PROCEDURE — 96375 TX/PRO/DX INJ NEW DRUG ADDON: CPT

## 2023-03-14 PROCEDURE — 85610 PROTHROMBIN TIME: CPT | Performed by: EMERGENCY MEDICINE

## 2023-03-14 PROCEDURE — 25500020 PHARM REV CODE 255: Performed by: EMERGENCY MEDICINE

## 2023-03-14 PROCEDURE — 96376 TX/PRO/DX INJ SAME DRUG ADON: CPT

## 2023-03-14 PROCEDURE — 81003 URINALYSIS AUTO W/O SCOPE: CPT | Performed by: EMERGENCY MEDICINE

## 2023-03-14 PROCEDURE — 83605 ASSAY OF LACTIC ACID: CPT | Mod: 91 | Performed by: EMERGENCY MEDICINE

## 2023-03-14 PROCEDURE — 85025 COMPLETE CBC W/AUTO DIFF WBC: CPT | Performed by: EMERGENCY MEDICINE

## 2023-03-14 PROCEDURE — 96365 THER/PROPH/DIAG IV INF INIT: CPT | Mod: 59

## 2023-03-14 PROCEDURE — 25000003 PHARM REV CODE 250: Performed by: EMERGENCY MEDICINE

## 2023-03-14 PROCEDURE — 63600175 PHARM REV CODE 636 W HCPCS: Performed by: EMERGENCY MEDICINE

## 2023-03-14 PROCEDURE — 96361 HYDRATE IV INFUSION ADD-ON: CPT

## 2023-03-14 PROCEDURE — 80053 COMPREHEN METABOLIC PANEL: CPT | Performed by: EMERGENCY MEDICINE

## 2023-03-14 RX ORDER — OXYCODONE AND ACETAMINOPHEN 10; 325 MG/1; MG/1
1 TABLET ORAL EVERY 4 HOURS PRN
Qty: 18 TABLET | Refills: 0 | Status: SHIPPED | OUTPATIENT
Start: 2023-03-14

## 2023-03-14 RX ORDER — HYDROMORPHONE HYDROCHLORIDE 1 MG/ML
1 INJECTION, SOLUTION INTRAMUSCULAR; INTRAVENOUS; SUBCUTANEOUS
Status: COMPLETED | OUTPATIENT
Start: 2023-03-14 | End: 2023-03-14

## 2023-03-14 RX ORDER — CIPROFLOXACIN 2 MG/ML
400 INJECTION, SOLUTION INTRAVENOUS
Status: COMPLETED | OUTPATIENT
Start: 2023-03-14 | End: 2023-03-14

## 2023-03-14 RX ORDER — MORPHINE SULFATE 2 MG/ML
6 INJECTION, SOLUTION INTRAMUSCULAR; INTRAVENOUS
Status: DISCONTINUED | OUTPATIENT
Start: 2023-03-14 | End: 2023-03-14

## 2023-03-14 RX ORDER — VALSARTAN AND HYDROCHLOROTHIAZIDE 160; 12.5 MG/1; MG/1
1 TABLET, FILM COATED ORAL DAILY
COMMUNITY
Start: 2023-02-16

## 2023-03-14 RX ORDER — AMITRIPTYLINE HYDROCHLORIDE 25 MG/1
25 TABLET, FILM COATED ORAL DAILY
COMMUNITY
Start: 2023-03-06

## 2023-03-14 RX ORDER — KETOROLAC TROMETHAMINE 30 MG/ML
15 INJECTION, SOLUTION INTRAMUSCULAR; INTRAVENOUS
Status: COMPLETED | OUTPATIENT
Start: 2023-03-14 | End: 2023-03-14

## 2023-03-14 RX ORDER — CIPROFLOXACIN 500 MG/1
500 TABLET ORAL 2 TIMES DAILY
Qty: 20 TABLET | Refills: 0 | Status: SHIPPED | OUTPATIENT
Start: 2023-03-14 | End: 2023-03-24

## 2023-03-14 RX ADMIN — HYDROMORPHONE HYDROCHLORIDE 1 MG: 1 INJECTION, SOLUTION INTRAMUSCULAR; INTRAVENOUS; SUBCUTANEOUS at 08:03

## 2023-03-14 RX ADMIN — KETOROLAC TROMETHAMINE 15 MG: 30 INJECTION, SOLUTION INTRAMUSCULAR; INTRAVENOUS at 08:03

## 2023-03-14 RX ADMIN — IOHEXOL 100 ML: 350 INJECTION, SOLUTION INTRAVENOUS at 09:03

## 2023-03-14 RX ADMIN — CIPROFLOXACIN 400 MG: 2 INJECTION, SOLUTION INTRAVENOUS at 11:03

## 2023-03-14 RX ADMIN — SODIUM CHLORIDE 1000 ML: 9 INJECTION, SOLUTION INTRAVENOUS at 11:03

## 2023-03-14 RX ADMIN — SODIUM CHLORIDE 1000 ML: 9 INJECTION, SOLUTION INTRAVENOUS at 08:03

## 2023-03-14 RX ADMIN — HYDROMORPHONE HYDROCHLORIDE 1 MG: 1 INJECTION, SOLUTION INTRAMUSCULAR; INTRAVENOUS; SUBCUTANEOUS at 02:03

## 2023-03-14 RX ADMIN — HYDROMORPHONE HYDROCHLORIDE 1 MG: 1 INJECTION, SOLUTION INTRAMUSCULAR; INTRAVENOUS; SUBCUTANEOUS at 11:03

## 2023-03-14 NOTE — ED PROVIDER NOTES
"Encounter Date: 3/14/2023    SCRIBE #1 NOTE: I, Wilson Faustin, am scribing for, and in the presence of,  Kashmir Isabel MD. I have scribed the following portions of the note - Other sections scribed: HPI, ROS, PE.     History     Chief Complaint   Patient presents with    Groin Pain     Since 2am this morning pain 10/10 on pain scale     Time seen by provider: 7:17 AM    This is a 62 y.o. male with PMHx of DM, HTN, Hep C, and chronic lower back pain who presents with complaint of right groin pain. Patient was admitted one month ago for suspected renal infarcts with similar, albeit left-sided, pain which improved with IV pain medications. He was also started on a 30 day course of Plavix, when he completed  His pain began five hours ago after a small bowel movement. No recent lifting or increased activity. The pain radiates down his leg, up to his back, and into his scrotum. No urinary symptoms such as dysuria, hematuria, or frequency. Pain increases with movement of the scrotum. He took Norco at home without relief. Patient does not express any concern for STI as he is not sexually active. No recent fevers. This is the extent of the patient's complaints at this time.    The history is provided by the patient.   Review of patient's allergies indicates:   Allergen Reactions    Lactose     Gabapentin Other (See Comments)     Patient states, "it makes me bleed from my rectum. Don't give it to me."    Atorvastatin Rash     Dye used    Opioids - morphine analogues Rash    Penicillins Rash     Pt states a "a couple of years ago" he took penicillin and broke out in "red spots" with no itching or difficulty breathing     Past Medical History:   Diagnosis Date    Diabetes mellitus     Gout     Hepatitis C     Hypercholesteremia     Hypertension      Past Surgical History:   Procedure Laterality Date    ARTHROPLASTY, KNEE, TOTAL, SIGHT ASSISTED Left 7/26/2021    Procedure: ARTHROPLASTY, KNEE, TOTAL, SIGHT ASSISTED;  " Surgeon: Jan Rust MD;  Location: [facility];  Service: Orthopedics;  Laterality: Left;    BACK SURGERY      CORONARY ANGIOGRAPHY  08/24/2016    no CAD    FRACTURE SURGERY Left     leg    FUSION OF SPINE WITH INSTRUMENTATION N/A 12/20/2018    Procedure: FUSION, SPINE, WITH INSTRUMENTATION Removal of spinal hardware, Bilateral Jamison-White Osteotomy L5-S1, L5-S1 Interbody Arthrodesis, L4-S1 Segmental Instrumentation with Posterior Lateral Arthrodesis;  Surgeon: Ezequiel Pedroza MD;  Location: [facility] OR;  Service: Neurosurgery;  Laterality: N/A;  Procedure: Removal of spinal hardware, Revision of Posterolateral fusion from L3-S1, L5-S1 In    HIP SURGERY Right     hip fusion    INJECTION OF STEROID Right 3/22/2019    Procedure: INJECTION, STEROID Right SI Joint Block and Steroid Injection;  Surgeon: Ezequiel Pedroza MD;  Location: [facility];  Service: Neurosurgery;  Laterality: Right;  Procedure: Right SI Joint Block and Steroid Injection  Surgery Time: 15 Min  LOS:0  Anesthesia: General  Radiology: C-Arm  Bed: Regular Bed  Position: Prone    KNEE ARTHROSCOPY      LAMINECTOMY      Lumbar    MYELOGRAPHY N/A 11/5/2018    Procedure: Myelogram;  Surgeon: [facility] Diagnostic Provider;  Location: [address];  Service: Radiology;  Laterality: N/A;    right wrist surgery      TRIAL OF SPINAL CORD NERVE STIMULATOR N/A 9/20/2019    Procedure: Trial, Neurostimulator, Spinal Cord Spinal Cord Stimulator Trial using 2 Percutaneous Least at T9-10;  Surgeon: Ezequiel Pedroza MD;  Location: [address];  Service: Neurosurgery;  Laterality: N/A;  Procedure: Spinal Cord Stimulator Trial using 2 Percutaneous Least at T9-10  Surgery Time: 1 Hr  LOS: 0  Anesthesia: General  Radiology: C-arm  Bed: [place] 4 Poster  Position: Prone  Eq     Family History   Problem Relation Age of Onset    Diabetes Sister     Hypertension Sister     Diabetes Brother     Hypertension Brother     Melanoma Neg Hx     Psoriasis Neg Hx     Lupus Neg Hx       Social History     Tobacco Use    Smoking status: Former     Passive exposure: Never    Smokeless tobacco: Never    Tobacco comments:     quit over 30yrs   Substance Use Topics    Alcohol use: No    Drug use: Yes     Types: Marijuana     Comment: daily     Review of Systems   Constitutional:  Negative for fever.   HENT:  Negative for congestion.    Eyes:  Negative for redness.   Respiratory:  Negative for shortness of breath.    Cardiovascular:  Negative for chest pain.   Gastrointestinal:  Negative for abdominal pain.   Genitourinary:  Positive for testicular pain. Negative for dysuria, frequency and hematuria.   Musculoskeletal:  Positive for back pain.   Skin:  Negative for rash.   Neurological:  Negative for headaches.   Psychiatric/Behavioral:  Negative for confusion.      Physical Exam     Initial Vitals [03/14/23 0615]   BP Pulse Resp Temp SpO2   (!) 136/93 93 16 97.6 °F (36.4 °C) 100 %      MAP       --         Physical Exam    Nursing note and vitals reviewed.  Constitutional: He appears well-developed and well-nourished. He is not diaphoretic. No distress.   HENT:   Head: Normocephalic and atraumatic.   Eyes: Conjunctivae are normal. No scleral icterus.   Neck: Neck supple.   Cardiovascular:  Normal rate, regular rhythm, normal heart sounds and intact distal pulses.           No murmur heard.  Pulmonary/Chest: Breath sounds normal. No respiratory distress. He has no wheezes. He has no rhonchi. He has no rales.   Abdominal: Abdomen is soft. There is no abdominal tenderness. Hernia confirmed negative in the right inguinal area and confirmed negative in the left inguinal area.   There is right CVA tenderness.  No left CVA tenderness. There is no rebound and no guarding.   Genitourinary: Right testis shows tenderness. Right testis shows no swelling. Left testis shows no swelling and no tenderness.    Genitourinary Comments: No testicular edema or scrotal erythema.     Musculoskeletal:         General: No  edema.      Cervical back: Neck supple.     Neurological: He is alert and oriented to person, place, and time.   Skin: Skin is warm and dry.       ED Course   Procedures  Labs Reviewed   URINALYSIS, REFLEX TO URINE CULTURE - Abnormal; Notable for the following components:       Result Value    Ketones, UA 1+ (*)     All other components within normal limits    Narrative:     Specimen Source->Urine   CBC W/ AUTO DIFFERENTIAL - Abnormal; Notable for the following components:    RBC 6.22 (*)     MCV 79 (*)     MCH 26.4 (*)     RDW 14.8 (*)     Gran # (ANC) 8.1 (*)     Gran % 77.0 (*)     Lymph % 12.2 (*)     All other components within normal limits   COMPREHENSIVE METABOLIC PANEL - Abnormal; Notable for the following components:    Total Protein 8.5 (*)     All other components within normal limits   LACTIC ACID, PLASMA - Abnormal; Notable for the following components:    Lactate (Lactic Acid) 3.5 (*)     All other components within normal limits    Narrative:        critical result(s) called and verbal readback obtained from   LA 3.52  SAMI PRATHER RN by CH8 03/14/2023 08:59   PROTIME-INR   LACTIC ACID, PLASMA          Imaging Results              CT Abdomen Pelvis With Contrast (Final result)  Result time 03/14/23 10:08:09      Final result by Ashley Montes MD (03/14/23 10:08:09)                   Impression:      Overall, stable exam compared to prior with no detrimental interval change.    Continued linear bands of decreased density present in both kidneys not appreciably changed from prior.  Findings are nonspecific and may be the sequela of renal infarcts or other insult.  No perinephric stranding or hydroureteronephrosis.    Colonic diverticulosis with no evidence for acute diverticulitis.    Subsegmental atelectasis right lung base.      Electronically signed by: Ashley Montes  Date:    03/14/2023  Time:    10:08               Narrative:    EXAMINATION:  CT ABDOMEN PELVIS WITH CONTRAST    CLINICAL  HISTORY:  Renal ischemia or infarction;H/o recent renal infarct with recurrent pain, now on R side;    TECHNIQUE:  Low dose axial images, sagittal and coronal reformations were obtained from the lung bases to the pubic symphysis following the IV administration of 100 mL of Omnipaque 350.    COMPARISON:  02/09/2023    FINDINGS:  The lung bases show subsegmental right basilar atelectasis or scarring.    The liver is homogeneous.  Gallbladder is unremarkable.  Spleen and pancreas are unremarkable.    Adrenal glands are normal.  Kidneys concentrate contrast appropriately.  Linear bands of decreased density present in both kidneys in a similar manner compared to prior.  The urinary bladder is unremarkable.    Aorta is normal caliber.  No retroperitoneal or mesenteric lymph node enlargement seen.    Stomach and loops of bowel are normal caliber.  Visualized appendix is normal.  Scattered colonic diverticulosis with no associated inflammatory stranding.  No significant free fluid in the pelvis.    Regional skeleton shows degenerative change with probable canal narrowing L2-3.  Prior posterior fusion L4 through S1.  Postoperative changes right hip, partially imaged.                                       US Scrotum And Testicles (Final result)  Result time 03/14/23 09:05:38      Final result by Henok Carpio MD (03/14/23 09:05:38)                   Impression:      Thickened right epididymis with increased perfusion concerning for acute right epididymitis.      Electronically signed by: Henok Carpio MD  Date:    03/14/2023  Time:    09:05               Narrative:    EXAMINATION:  US SCROTUM AND TESTICLES    CLINICAL HISTORY:  Right testicular pain    TECHNIQUE:  Sonography of the scrotum and testes.    COMPARISON:  Ultrasound from 02/09/2023    FINDINGS:  Right Testicle:    *Size: 4.5 x 2.5 x 2.9 cm  *Appearance: Mild heterogeneity, likely secondary to atrophy.  *Flow: Normal arterial and venous flow  *Epididymis:  Epididymis is thickened with increased perfusion.  *Hydrocele: None.  *Varicocele: None.  .    Left Testicle:    *Size: 4.5 x 2.6 x 3.0 cm  *Appearance: Mild heterogeneity, likely secondary to atrophy.  *Flow: Normal arterial and venous flow  *Epididymis: Normal.  *Hydrocele: None.  *Varicocele: None.  .    Other findings: None.                                       Medications   sodium chloride 0.9% bolus 1,000 mL 1,000 mL (0 mLs Intravenous Stopped 3/14/23 0903)   ketorolac injection 15 mg (15 mg Intravenous Given 3/14/23 0802)   HYDROmorphone injection 1 mg (1 mg Intravenous Given 3/14/23 0816)   iohexoL (OMNIPAQUE 350) injection 100 mL (100 mLs Intravenous Given 3/14/23 0936)   sodium chloride 0.9% bolus 1,000 mL 1,000 mL (0 mLs Intravenous Stopped 3/14/23 1441)   HYDROmorphone injection 1 mg (1 mg Intravenous Given 3/14/23 1134)   ciprofloxacin (CIPRO)400mg/200ml D5W IVPB 400 mg (0 mg Intravenous Stopped 3/14/23 1239)   HYDROmorphone injection 1 mg (1 mg Intravenous Given 3/14/23 1438)     Medical Decision Making:   History:   Old Medical Records: I decided to obtain old medical records.  Initial Assessment:       62-year-old male with history of DM, HTN, hep C, chronic low back pain presents for evaluation of acute right testicular and groin onset last night.  Patient states he had similar pain on left side last month when admitted here, and per chart review he had signs of of bilateral renal infarcts on CT then, but only complained of left groin and flank pain then.  Workup then with no other signs of emboli on DVT and PE imaging, or clear source such as AFib, he was started on aspirin and Plavix which he has been compliant with since then.  He deals with chronic low back pain and has a fused right hip and takes Norco t.i.d. daily, but no recurrent left flank pain or issues until last night, denies any activity to cause this pain or clear precipitant.  He locates pain primarily to right testicle that radiates  to his right groin and flank, denies any associated urinary symptoms or fevers/nausea, no other complaints.  On exam patient in mild distress due to pain, with right CVA tenderness and significant right testicular tenderness, though no obvious testicular swelling or overlying skin changes, no sign of inguinal hernia or other clear etiology for his presentation.  He is not sexually active so unlikely to be epididymitis/orchitis, low suspicion for UTI.  We will need to repeat imaging to re-evaluate for renal infarcts, check basic labs, give pain control and reassess.      Initial workup with normal WBC, CR 1.3 slightly increased from previous, and lactate 3.5.  Patient remained with slightly elevated blood pressure throughout ED stay, no fever or signs of sepsis.  UA with no signs of UTI.  Ultrasound of scrotum did show signs of right epididymitis, but no torsion or other acute process.  This was not present on U/S from previous admission.  CT abdomen repeated with contrast and shows relatively unchanged appearance of wedge-shaped suspected infarcts on bilateral kidney seen previously, no new process.  Patient has significant R testicular pain which I suspect is also related to opiate tolerance from long term daily Norco use, and required multiple doses of Dilaudid for pain control.  Discussed with Dr. Stewart, urology on-call, who stated that symptoms and presentation are consistent with epididymitis, which can have normal UA, and he recommended starting Cipro for this.  Based on unchanged CT from previous admission and otherwise reassuring workup, he stated that patient can be discharged if pain is controlled.  Patient was treated with IVF and lactate repeated and normalized, and he remained well-appearing in ED with no fever or other new complaints.  I discussed with patient staying in our ED observation unit versus discharge home with 10 mg Percocet instead of Norco which he is likely tolerant to.  He is willing to  try trial of outpatient management, will discharge with Cipro b.i.d. as well as pain control, with close outpatient Urology follow-up that his PCP is scheduling, and return precautions for any worsening pain, fevers, or other new concerning symptoms.      Clinical Tests:   Lab Tests: Ordered and Reviewed  Radiological Study: Ordered and Reviewed        Scribe Attestation:   Scribe #1: I performed the above scribed service and the documentation accurately describes the services I performed. I attest to the accuracy of the note.  I, Dr. Kashmir Isabel, personally performed the services described in this documentation. All medical record entries made by the scribe were at my direction and in my presence.  I have reviewed the chart and agree that the record reflects my personal performance and is accurate and complete. Kashmir Isabel MD.                        Clinical Impression:   Final diagnoses:  [N50.811] Right testicular pain  [N45.1] Right epididymitis (Primary)        ED Disposition Condition    Discharge Stable          ED Prescriptions       Medication Sig Dispense Start Date End Date Auth. Provider    ciprofloxacin HCl (CIPRO) 500 MG tablet Take 1 tablet (500 mg total) by mouth 2 (two) times daily. for 10 days 20 tablet 3/14/2023 3/24/2023 Kashmir Isabel MD    oxyCODONE-acetaminophen (PERCOCET)  mg per tablet Take 1 tablet by mouth every 4 (four) hours as needed for Pain. 18 tablet 3/14/2023 -- Kashmir Isabel MD          Follow-up Information    None          Kashmir Isabel MD  03/14/23 1944

## 2023-03-18 ENCOUNTER — NURSE TRIAGE (OUTPATIENT)
Dept: ADMINISTRATIVE | Facility: CLINIC | Age: 63
End: 2023-03-18
Payer: MEDICARE

## 2023-03-18 NOTE — TELEPHONE ENCOUNTER
"Pt was trying to reach Dr. Smith's staff to "hook his monitor up"  Pt said he was instructed to call the office when he received it.  Care advice states for the pt to call the providers office when its open.  Pt verbally understood all instructions, all questions answered, advised to call back for any questions, concerns or symptoms.      Reason for Disposition   [1] Caller requesting NON-URGENT health information AND [2] PCP's office is the best resource    Protocols used: Information Only Call - No Triage-A-    "

## 2023-03-20 ENCOUNTER — TELEPHONE (OUTPATIENT)
Dept: CARDIOLOGY | Facility: HOSPITAL | Age: 63
End: 2023-03-20
Payer: MEDICARE

## 2023-03-20 NOTE — TELEPHONE ENCOUNTER
Call returned to Luiza Dennys to assisted with properly connected his monitor lead reversal displayed on baseline report.

## 2023-04-28 ENCOUNTER — OFFICE VISIT (OUTPATIENT)
Dept: CARDIOLOGY | Facility: CLINIC | Age: 63
End: 2023-04-28
Payer: MEDICARE

## 2023-04-28 VITALS
BODY MASS INDEX: 28.54 KG/M2 | WEIGHT: 210.38 LBS | HEART RATE: 88 BPM | DIASTOLIC BLOOD PRESSURE: 92 MMHG | SYSTOLIC BLOOD PRESSURE: 140 MMHG | OXYGEN SATURATION: 97 %

## 2023-04-28 DIAGNOSIS — I50.32 HYPERTENSIVE HEART DISEASE WITH CHRONIC DIASTOLIC CONGESTIVE HEART FAILURE: Primary | ICD-10-CM

## 2023-04-28 DIAGNOSIS — I11.0 HYPERTENSIVE HEART DISEASE WITH CHRONIC DIASTOLIC CONGESTIVE HEART FAILURE: Primary | ICD-10-CM

## 2023-04-28 PROCEDURE — 99213 PR OFFICE/OUTPT VISIT, EST, LEVL III, 20-29 MIN: ICD-10-PCS | Mod: S$GLB,,, | Performed by: INTERNAL MEDICINE

## 2023-04-28 PROCEDURE — 3044F HG A1C LEVEL LT 7.0%: CPT | Mod: CPTII,S$GLB,, | Performed by: INTERNAL MEDICINE

## 2023-04-28 PROCEDURE — 1159F PR MEDICATION LIST DOCUMENTED IN MEDICAL RECORD: ICD-10-PCS | Mod: CPTII,S$GLB,, | Performed by: INTERNAL MEDICINE

## 2023-04-28 PROCEDURE — 3080F DIAST BP >= 90 MM HG: CPT | Mod: CPTII,S$GLB,, | Performed by: INTERNAL MEDICINE

## 2023-04-28 PROCEDURE — 3008F PR BODY MASS INDEX (BMI) DOCUMENTED: ICD-10-PCS | Mod: CPTII,S$GLB,, | Performed by: INTERNAL MEDICINE

## 2023-04-28 PROCEDURE — 3077F PR MOST RECENT SYSTOLIC BLOOD PRESSURE >= 140 MM HG: ICD-10-PCS | Mod: CPTII,S$GLB,, | Performed by: INTERNAL MEDICINE

## 2023-04-28 PROCEDURE — 99999 PR PBB SHADOW E&M-EST. PATIENT-LVL III: CPT | Mod: PBBFAC,,, | Performed by: INTERNAL MEDICINE

## 2023-04-28 PROCEDURE — 3080F PR MOST RECENT DIASTOLIC BLOOD PRESSURE >= 90 MM HG: ICD-10-PCS | Mod: CPTII,S$GLB,, | Performed by: INTERNAL MEDICINE

## 2023-04-28 PROCEDURE — 99999 PR PBB SHADOW E&M-EST. PATIENT-LVL III: ICD-10-PCS | Mod: PBBFAC,,, | Performed by: INTERNAL MEDICINE

## 2023-04-28 PROCEDURE — 1160F RVW MEDS BY RX/DR IN RCRD: CPT | Mod: CPTII,S$GLB,, | Performed by: INTERNAL MEDICINE

## 2023-04-28 PROCEDURE — 99213 OFFICE O/P EST LOW 20 MIN: CPT | Mod: S$GLB,,, | Performed by: INTERNAL MEDICINE

## 2023-04-28 PROCEDURE — 1160F PR REVIEW ALL MEDS BY PRESCRIBER/CLIN PHARMACIST DOCUMENTED: ICD-10-PCS | Mod: CPTII,S$GLB,, | Performed by: INTERNAL MEDICINE

## 2023-04-28 PROCEDURE — 3077F SYST BP >= 140 MM HG: CPT | Mod: CPTII,S$GLB,, | Performed by: INTERNAL MEDICINE

## 2023-04-28 PROCEDURE — 3044F PR MOST RECENT HEMOGLOBIN A1C LEVEL <7.0%: ICD-10-PCS | Mod: CPTII,S$GLB,, | Performed by: INTERNAL MEDICINE

## 2023-04-28 PROCEDURE — 3008F BODY MASS INDEX DOCD: CPT | Mod: CPTII,S$GLB,, | Performed by: INTERNAL MEDICINE

## 2023-04-28 PROCEDURE — 1159F MED LIST DOCD IN RCRD: CPT | Mod: CPTII,S$GLB,, | Performed by: INTERNAL MEDICINE

## 2023-06-03 NOTE — ANESTHESIA POSTPROCEDURE EVALUATION
Anesthesia Post Evaluation    Patient: Ezekiel Noyola    Procedure(s) Performed: Procedure(s) (LRB):  Trial, Neurostimulator, Spinal Cord Spinal Cord Stimulator Trial using 2 Percutaneous Least at T9-10 (N/A)    Final Anesthesia Type: general  Patient location during evaluation: PACU  Patient participation: Yes- Able to Participate  Level of consciousness: awake and alert and oriented  Post-procedure vital signs: reviewed and stable  Pain management: adequate  Airway patency: patent  PONV status at discharge: No PONV  Anesthetic complications: no      Cardiovascular status: hemodynamically stable  Respiratory status: unassisted, spontaneous ventilation and room air  Hydration status: euvolemic  Follow-up not needed.          Vitals Value Taken Time   /82 9/25/2019  1:40 PM   Temp 36.9 °C (98.4 °F) 9/25/2019  1:40 PM   Pulse  9/25/2019  6:49 PM   Resp  9/25/2019  6:49 PM   SpO2  9/25/2019  6:49 PM         Event Time     Out of Recovery 09:45:00          Pain/Jose A Score: No data recorded       OFFICE VISIT    Patient: oRss Randle   : 1972 MRN: [id number]    SUBJECTIVE:  Chief Complaint   Patient presents with   • Back Pain     Pt experiencing back pain/ pressurefor approximately 1 week   • Office Visit       Patient has given consent to record this visit for documentation in their clinical record.    A 50 year old female presents for an evaluation of back pain.      HISTORY OF PRESENT ILLNESS:  Historian: Self accompanied by .     Class 3 severe obesity with body mass index (BMI) of 40.0 to 44.9 in adult, unspecified obesity type, unspecified whether serious comorbidity present (CMD): Trying to lose weight.     Hyperlipidemia, unspecified hyperlipidemia type: Due for labs. History of fatty liver. Denies having family history of fatty liver.     Prediabetes: Due for labs.     Screening for blood or protein in urine: Due for labs.      Acute right-sided low back pain without sciatica: Complains of right sided back pain. Has soreness and radiating pain towards leg. Unable to move or walk. Did X-ray of lumbar spine which revealed mild arthritic changes. Denies limping, taking any medication, did X-ray of hip, any injury, or any issue while passing bowel.     PAST MEDICAL HISTORY:  History reviewed. No pertinent past medical history.  MEDICATIONS:  Current Outpatient Medications   Medication Sig Dispense Refill   • ibuprofen (MOTRIN) 800 MG tablet Take 1 tablet by mouth every 8 hours as needed for Pain. 90 tablet 1   • levothyroxine (Euthyrox) 150 MCG tablet Take 1 tablet by mouth daily. 90 tablet 3   • hydroCHLOROthiazide (HYDRODIURIL) 12.5 MG tablet TAKE 1 TABLET BY MOUTH EVERY DAY 30 tablet 32   • clobetasol (TEMOVATE) 0.05 % topical solution APPLY 1 ML TO THE SCALP NIGHTLY X3 WEEKS, THEN EVERY OVER THE NIGHT X 3 WEEKS     • ketoconazole (NIZORAL) 2 % shampoo APPLY 1 APPLICATION ON THE SKIN USE ON SCALP 3X A MONTH     • naproxen (NAPROSYN) 500 MG tablet Take 1 tablet by mouth 2 times daily  (with meals). 30 tablet 0   • metroNIDAZOLE (FLAGYL) 500 MG tablet Take 500 mg by mouth every 12 hours. For 7 days     • cyclobenzaprine (FLEXERIL) 10 MG tablet Take 1 tablet by mouth at bedtime as needed for Muscle spasms. Not within 8 hours of work duties. Do not mix with other sedating medications or substances. 7 tablet 0     No current facility-administered medications for this visit.     ALLERGIES:  Allergies as of 06/02/2023   • (No Known Allergies)     FAMILY HISTORY:  Family History   Problem Relation Age of Onset   • Hypertension Mother    • Cancer Father    • Diabetes Sister    • Cerebral Palsy Brother    • Asthma Daughter      SOCIAL HISTORY:  Social History     Tobacco Use   • Smoking status: Some Days     Current packs/day: 0.00   • Smokeless tobacco: Never   Vaping Use   • Vaping Use: never used   Substance Use Topics   • Alcohol use: Yes     Alcohol/week: 1.0 standard drink of alcohol     Types: 1 Shots of liquor per week     Comment: social   • Drug use: Never     Past Surgical HISTORY  Past Surgical History:   Procedure Laterality Date   • Thyroidectomy      goiter       REVIEW OF SYSTEMS:  Constitutional: As Per HPI.  Cardiovascular: As Per HPI.  Gastrointestinal: As Per HPI.  Endocrine: As Per HPI.  Musculoskeletal: As Per HPI.  Neurological: As Per HPI.  OBJECTIVE:  Visit Vitals  /86 (BP Location: LUE - Left upper extremity, Patient Position: Sitting, Cuff Size: Large Adult)   Pulse 85   Temp 97.4 °F (36.3 °C) (Temporal)   Ht 5' 3\"   Wt 106.4 kg (234 lb 7.4 oz)   LMP  (LMP Unknown)   SpO2 99%   BMI 41.53 kg/m²       PHYSICAL EXAM:  Constitutional: In no acute distress. Well developed.  Eyes: The conjunctiva exhibited no abnormalities. The sclera was normal.  Neck: No carotid bruit. No palpable neck masses, glands, no thyroid nodule. No enlarged thyroid.   Pulmonary: no respiratory distress, normal respiratory rate and effort and no accessory muscle use. Breath sounds clear to auscultation  bilaterally.   Cardiovascular: normal rate, no murmurs were heard, regular rhythm, normal S1 and normal S2. Edema was not present in the lower extremities.   Skin: Skin moisture and turgor normal.   Psychiatric: Oriented to time, place, and person. The mood was normal. The affect was normal. The memory was unimpaired.   DIAGNOSTIC STUDIES:  LAB RESULTS:  No visits with results within 1 Month(s) from this visit.   Latest known visit with results is:   Lab Services on 03/24/2023   Component Date Value Ref Range Status   • WBC 03/24/2023 7.0  4.2 - 11.0 K/mcL Final   • RBC 03/24/2023 5.16  4.00 - 5.20 mil/mcL Final   • HGB 03/24/2023 11.1 (L)  12.0 - 15.5 g/dL Final   • HCT 03/24/2023 36.3  36.0 - 46.5 % Final   • MCV 03/24/2023 70.3 (L)  78.0 - 100.0 fl Final   • MCH 03/24/2023 21.5 (L)  26.0 - 34.0 pg Final   • MCHC 03/24/2023 30.6 (L)  32.0 - 36.5 g/dL Final   • RDW-CV 03/24/2023 17.3 (H)  11.0 - 15.0 % Final   • RDW-SD 03/24/2023 43.8  39.0 - 50.0 fL Final   • PLT 03/24/2023 337  140 - 450 K/mcL Final   • NRBC 03/24/2023 0  <=0 /100 WBC Final   • Neutrophil, Percent 03/24/2023 48  % Final   • Lymphocytes, Percent 03/24/2023 40  % Final   • Mono, Percent 03/24/2023 8  % Final   • Eosinophils, Percent 03/24/2023 3  % Final   • Basophils, Percent 03/24/2023 1  % Final   • Immature Granulocytes 03/24/2023 0  % Final   • Absolute Neutrophils 03/24/2023 3.4  1.8 - 7.7 K/mcL Final   • Absolute Lymphocytes 03/24/2023 2.8  1.0 - 4.8 K/mcL Final   • Absolute Monocytes 03/24/2023 0.6  0.3 - 0.9 K/mcL Final   • Absolute Eosinophils  03/24/2023 0.2  0.0 - 0.5 K/mcL Final   • Absolute Basophils 03/24/2023 0.1  0.0 - 0.3 K/mcL Final   • Absolute Immature Granulocytes 03/24/2023 0.0  0.0 - 0.2 K/mcL Final       ASSESSMENT AND PLAN:  This 50 year old female presents with :  1. Class 3 severe obesity with body mass index (BMI) of 40.0 to 44.9 in adult, unspecified obesity type, unspecified whether serious comorbidity present (CMD)     2. Hyperlipidemia, unspecified hyperlipidemia type    3. Prediabetes    4. Screening for blood or protein in urine    5. Acute right-sided low back pain without sciatica        Orders Placed This Encounter   • Comprehensive Metabolic Panel   • Lipid Panel Without Reflex   • Urinalysis & Reflex Microscopy   • ibuprofen (MOTRIN) 800 MG tablet       PLAN:  Class 3 severe obesity with body mass index (BMI) of 40.0 to 44.9 in adult, unspecified obesity type, unspecified whether serious comorbidity present (CMD):  Encouraged to exercise regularly and stay physically active.  Follow healthy dietary modifications to help with weight loss.     Hyperlipidemia, unspecified hyperlipidemia type:  Ordered lipid panel.      Prediabetes:  Ordered CMP.      Screening for blood or protein in urine:  Ordered urinalysis.      Acute right-sided low back pain without sciatica:   Prescribed ibuprofen 800 mg with food every eight hours as needed.     Follow up in six months or sooner if needed.    Refer to orders.  Medical compliance with plan discussed and risks of non-compliance reviewed.  Patient education completed on disease process, etiology & prognosis.  Proper usage and side effects of medications reviewed & discussed.  Return to clinic as clinically indicated as discussed with the patient who verbalized understanding of the plan and is in agreement with the plan.    I,  Dr. Ramakrishna Crane, have created a visit summary document based on the audio recording between Dr. Dougie Rosas MD and this patient for the physician to review, edit as needed, and authenticate.   I have reviewed and edited the visit summary above and attest that it is accurate.       Creation Date: 6/3/2023

## 2023-07-21 ENCOUNTER — TELEPHONE (OUTPATIENT)
Dept: NEUROSURGERY | Facility: CLINIC | Age: 63
End: 2023-07-21
Payer: MEDICARE

## 2023-07-21 NOTE — TELEPHONE ENCOUNTER
Returned pt's call, pt stated that Wednesday he was rushed to the hospital due to his left side of his body went numb with facial drooping  Pt stated that he was released from the hospital yesterday and had to be rushed back again last night due to the same reason. Pt stated that ER stated took some imaging and stated to him that he is not having a stroke and he will need to see his neurosurgeon. I stated that  next available is not until September, offered pt an appointment with Marta on Monday, July 24. I scheduled pt to see Marta Monday, July 24 at 10:00 am. Pt confirmed and voiced understanding

## 2023-07-24 ENCOUNTER — OFFICE VISIT (OUTPATIENT)
Dept: NEUROSURGERY | Facility: CLINIC | Age: 63
End: 2023-07-24
Payer: MEDICARE

## 2023-07-24 VITALS
HEART RATE: 83 BPM | DIASTOLIC BLOOD PRESSURE: 144 MMHG | SYSTOLIC BLOOD PRESSURE: 196 MMHG | BODY MASS INDEX: 28.17 KG/M2 | HEIGHT: 72 IN | WEIGHT: 208 LBS

## 2023-07-24 DIAGNOSIS — M54.12 CERVICAL RADICULOPATHY: ICD-10-CM

## 2023-07-24 DIAGNOSIS — R20.2 NUMBNESS AND TINGLING OF LEFT ARM AND LEG: Primary | ICD-10-CM

## 2023-07-24 DIAGNOSIS — R20.0 ANESTHESIA OF SKIN: ICD-10-CM

## 2023-07-24 DIAGNOSIS — R29.898 LEFT LEG WEAKNESS: ICD-10-CM

## 2023-07-24 DIAGNOSIS — R20.0 NUMBNESS AND TINGLING OF LEFT ARM AND LEG: Primary | ICD-10-CM

## 2023-07-24 DIAGNOSIS — R29.810 FACIAL DROOP: ICD-10-CM

## 2023-07-24 DIAGNOSIS — M48.02 SPINAL STENOSIS, CERVICAL REGION: ICD-10-CM

## 2023-07-24 DIAGNOSIS — R29.898 LEFT ARM WEAKNESS: ICD-10-CM

## 2023-07-24 PROCEDURE — 99999 PR PBB SHADOW E&M-EST. PATIENT-LVL V: CPT | Mod: PBBFAC,,, | Performed by: PHYSICIAN ASSISTANT

## 2023-07-24 PROCEDURE — 3008F PR BODY MASS INDEX (BMI) DOCUMENTED: ICD-10-PCS | Mod: CPTII,S$GLB,, | Performed by: PHYSICIAN ASSISTANT

## 2023-07-24 PROCEDURE — 1159F MED LIST DOCD IN RCRD: CPT | Mod: CPTII,S$GLB,, | Performed by: PHYSICIAN ASSISTANT

## 2023-07-24 PROCEDURE — 99214 PR OFFICE/OUTPT VISIT, EST, LEVL IV, 30-39 MIN: ICD-10-PCS | Mod: S$GLB,,, | Performed by: PHYSICIAN ASSISTANT

## 2023-07-24 PROCEDURE — 99999 PR PBB SHADOW E&M-EST. PATIENT-LVL V: ICD-10-PCS | Mod: PBBFAC,,, | Performed by: PHYSICIAN ASSISTANT

## 2023-07-24 PROCEDURE — 3044F PR MOST RECENT HEMOGLOBIN A1C LEVEL <7.0%: ICD-10-PCS | Mod: CPTII,S$GLB,, | Performed by: PHYSICIAN ASSISTANT

## 2023-07-24 PROCEDURE — 1160F PR REVIEW ALL MEDS BY PRESCRIBER/CLIN PHARMACIST DOCUMENTED: ICD-10-PCS | Mod: CPTII,S$GLB,, | Performed by: PHYSICIAN ASSISTANT

## 2023-07-24 PROCEDURE — 3008F BODY MASS INDEX DOCD: CPT | Mod: CPTII,S$GLB,, | Performed by: PHYSICIAN ASSISTANT

## 2023-07-24 PROCEDURE — 3080F DIAST BP >= 90 MM HG: CPT | Mod: CPTII,S$GLB,, | Performed by: PHYSICIAN ASSISTANT

## 2023-07-24 PROCEDURE — 3044F HG A1C LEVEL LT 7.0%: CPT | Mod: CPTII,S$GLB,, | Performed by: PHYSICIAN ASSISTANT

## 2023-07-24 PROCEDURE — 3077F PR MOST RECENT SYSTOLIC BLOOD PRESSURE >= 140 MM HG: ICD-10-PCS | Mod: CPTII,S$GLB,, | Performed by: PHYSICIAN ASSISTANT

## 2023-07-24 PROCEDURE — 1160F RVW MEDS BY RX/DR IN RCRD: CPT | Mod: CPTII,S$GLB,, | Performed by: PHYSICIAN ASSISTANT

## 2023-07-24 PROCEDURE — 1159F PR MEDICATION LIST DOCUMENTED IN MEDICAL RECORD: ICD-10-PCS | Mod: CPTII,S$GLB,, | Performed by: PHYSICIAN ASSISTANT

## 2023-07-24 PROCEDURE — 99214 OFFICE O/P EST MOD 30 MIN: CPT | Mod: S$GLB,,, | Performed by: PHYSICIAN ASSISTANT

## 2023-07-24 PROCEDURE — 3080F PR MOST RECENT DIASTOLIC BLOOD PRESSURE >= 90 MM HG: ICD-10-PCS | Mod: CPTII,S$GLB,, | Performed by: PHYSICIAN ASSISTANT

## 2023-07-24 PROCEDURE — 3077F SYST BP >= 140 MM HG: CPT | Mod: CPTII,S$GLB,, | Performed by: PHYSICIAN ASSISTANT

## 2023-07-24 RX ORDER — DIAZEPAM 5 MG/1
TABLET ORAL
Qty: 5 TABLET | Refills: 0 | Status: SHIPPED | OUTPATIENT
Start: 2023-07-24

## 2023-07-24 RX ORDER — CLOPIDOGREL BISULFATE 75 MG/1
75 TABLET ORAL
COMMUNITY
Start: 2023-07-20

## 2023-07-24 RX ORDER — CALCIUM CITRATE/VITAMIN D3 200MG-6.25
TABLET ORAL
COMMUNITY
Start: 2023-06-21

## 2023-07-24 RX ORDER — CARVEDILOL 6.25 MG/1
12.5 TABLET ORAL DAILY
COMMUNITY
Start: 2023-07-05

## 2023-07-24 RX ORDER — BIMATOPROST 0.1 MG/ML
1 SOLUTION/ DROPS OPHTHALMIC
COMMUNITY
Start: 2023-06-13

## 2023-07-24 RX ORDER — LANCETS 33 GAUGE
EACH MISCELLANEOUS
COMMUNITY
Start: 2023-05-06

## 2023-07-24 RX ORDER — LATANOPROST 50 UG/ML
1 SOLUTION/ DROPS OPHTHALMIC
COMMUNITY
Start: 2023-06-13

## 2023-07-24 NOTE — PROGRESS NOTES
Subjective:     Patient ID:  Ezekiel Noyola is a 63 y.o. male.    Yovany    Chief Complaint:  Left facial droop.  Left arm and leg weakness and numbness.  Left arm pain    HPI    Ezekiel Noyola is a 63 y.o. male who presents for follow up.  Patient had previous lumbar spine surgery with Dr. Pedroza in the past.  Patient states last Wednesday he had acute onset of left facial droop and left arm and leg weakness and paresthesias and had to physically move his arm and leg.  He also had numbness and tingling at the same time.  He had to shake his arm in order for to start moving.  He called the ambulance and was taken to [facility] where a stroke workup was done and was negative.  He had another episode the next day and went back to the emergency room.  He had another episode yesterday that lasted 5 minutes as opposed to the other 2 episodes that lasted 10-15 minutes.  Each time he has left facial numbness tingling facial droop left arm and leg numbness and weakness.  He denies any right sided symptoms.  No change in vision, seizures, slurred speech although he said the  said he had 1.  Was told to follow-up with his neurologist and is here at the neurosurgery clinic.    He also has pain in the left side of the neck to the left elbow.    Patient denies any recent accidents or trauma, no saddle anesthesias, and no bowel or bladder incontinence.        Review of Systems:  Constitution: Negative for chills, fever, night sweats and weight loss.   Musculoskeletal: Negative for falls.   Gastrointestinal: Negative for bowel incontinence, nausea and vomiting.   Genitourinary: Negative for bladder incontinence.   Neurological: Negative for disturbances in coordination and loss of balance.      Objective:      Vitals:    07/24/23 0946   BP: (!) 196/144   Pulse: 83   Weight: 94.3 kg (208 lb)   Height: 6' (1.829 m)   PainSc:   8   PainLoc: Back       Physical Exam:    General: well developed, well nourished,  no distress.   Head: normocephalic.  Neck: No tracheal deviation.   Neurologic: Alert and oriented. Thought content appropriate.  GCS: Motor: 6/Verbal: 5/Eyes: 4 GCS Total: 15  Mental Status: Awake, Alert, Oriented x 3  Language: No aphasia  Speech: No dysarthria  Cranial nerves: No facial droop, tongue midline, CN II-XII grossly intact.   Eyes: pupils equal, round, reactive to light with accomodation, EOMI.   Ears: No drainage.   Pulmonary: normal respirations, no signs of respiratory distress  Sensory: intact to light touch throughout  Motor Strength: Moves all extremities spontaneously with good tone. No abnormal movements seen.   Pronator Drift: no drift noted  Finger-to-nose: Intact bilaterally  Vascular: No LE edema.   Skin: Skin is warm, dry and intact.    Neurological:     Muscle strength against resistance:     Right Left   Deltoid  5 / 5 5 / 5   Biceps  5 / 5 4 / 5   Triceps 5 / 5 4 / 5   Wrist flexion  5 / 5 5 / 5   Wrist extension 5 / 5 5 / 5   Finger abduction 5 / 5 5 / 5   Thumb opposition 5 / 5 5 / 5   Handgrip 5 / 5 5 / 5   Hip flexion  Cannot assess 4 / 5   Hip extension 5 / 5 5 / 5   Hip abduction 5 / 5 5 / 5   Hip adduction 5/ 5 5 / 5   Knee extension  5 / 5 5 / 5   Knee flexion  5 / 5 5 / 5   Dorsiflexion  5 / 5 5 / 5   EHL  5 / 5 5 / 5   Plantar flexion  5 / 5 5 / 5   Inversion of the feet 5 / 5 5 / 5   Eversion of the feet 5 / 5 5 / 5       Reflexes:     Right Left   Triceps 2+ 2+   Biceps 2+ 2+   Brachioradialis 2+ 2+   Patellar 2+ 2+   Achilles 2+ 2+     Babinski: Negative bilaterally  Clonus:  Negative bilaterally  Schulz: Negative bilaterally    On gross examination of the bilateral upper and lower extremities, patient has no signs of clubbing, cyanosis, or edema.       CT cspine/CT head/ CTA head results reviewed:     CT Cervical Spine Without Contrast  Order: [id number]  Impression    1.   High density curvilinear appearance at the anterior central spinal canal at the level of C6-7 and  spinal canal stenosis. It may represent disc extrusion versus artifact. MRI of the cervical spine is recommended for further characterization.   2.   Moderate left neuroforaminal narrowing from C4-5 through C6-7.   3.   Mild to moderate central canal stenosis at C4-5 through C5-6.   4.   Additional degenerative changes as described above.     Preliminary Report Dictated By: Cynthia Kerns MD     Electronically Signed By: Ken Maldonado MD 7/21/2023 5:16 PM CDT      Impression    No significant change compared to the prior study from the previous date. No large central vessel occlusion or severe stenosis.     Preliminary Report Dictated By: JENNA STEVENS DO     Electronically Signed By: JOAN CHAU 7/20/2023 11:50 PM CDT      CT HEAD FOR STROKE  Order: [id number]  Impression    No significant change compared to the prior study from the previous date.     Electronically Signed By: JOAN CHAU 7/20/2023 10:30 PM CDT        Assessment:          1. Numbness and tingling of left arm and leg    2. Spinal stenosis, cervical region    3. Anesthesia of skin    4. Left arm weakness    5. Left leg weakness    6. Facial droop    7. Cervical radiculopathy            Plan:          Orders Placed This Encounter    MRI Cervical Spine W WO Cont    MRI Brain W WO Contrast    diazePAM (VALIUM) 5 MG tablet       Will get MRI brain and cervical spine with and without contrast for further evaluation for stroke TIA workup and also to assess the cervical stenosis further.  Valium for MRIs.      We will review with Dr. Pedroza once completed.    He was told to go to the emergency room if he has another episode that lasts or if they become more frequent and persistent and he verbalized understanding.    Follow-Up:  Follow up if symptoms worsen or fail to improve. If there are any questions prior to this, the patient was instructed to contact the office.       Marta Salazar, [facility], PA-C  Neurosurgery  [facility]  07/24/2023

## 2023-08-03 ENCOUNTER — HOSPITAL ENCOUNTER (OUTPATIENT)
Dept: RADIOLOGY | Facility: HOSPITAL | Age: 63
Discharge: HOME | End: 2023-08-03
Attending: PHYSICIAN ASSISTANT
Payer: MEDICARE

## 2023-08-03 ENCOUNTER — TELEPHONE (OUTPATIENT)
Dept: NEUROSURGERY | Facility: CLINIC | Age: 63
End: 2023-08-03
Payer: MEDICARE

## 2023-08-03 DIAGNOSIS — R20.0 ANESTHESIA OF SKIN: ICD-10-CM

## 2023-08-03 DIAGNOSIS — M48.02 SPINAL STENOSIS, CERVICAL REGION: ICD-10-CM

## 2023-08-03 NOTE — TELEPHONE ENCOUNTER
We need those MRIs to see what is going on and if anything else needs to be done from our standpoint.    We could try ordering the MRIs with sedation at main campus but it will take some time to schedule.    Otherwise he should fu with his PCP for a referral to neurology.    Thanks,  Marta Salazar, [facility], PA-C  Neurosurgery  [facility]  08/03/2023

## 2023-08-03 NOTE — TELEPHONE ENCOUNTER
Reached out to patient to inform him that per the provider he will need those MRIs to so they can see what is going on and if anything else needs to be done from their standpoint. Patient was also notified per provider, they could try ordering the MRIs with sedation at [facility] but it will take some time to schedule. Otherwise he should fu with his PCP for a referral to neurology. Patient stated that he will contact his PCP office to get everything done.

## 2023-08-03 NOTE — TELEPHONE ENCOUNTER
Returned patient phone call to address msg received. Patient called into clinic to inform us that he wasn't able to perform his MRI and wanted to know the next step. Patient was made aware and voiced understanding that a msg will be sent to the providers and once a response is given, someone from the office will return his phone call.       ----- Message from Jelani Meza sent at 8/3/2023  1:00 PM CDT -----  Contact: pt  Type:  MRI    Who Called: pt  Would the patient rather a call back or a response via [facility]? call  Best Call Back Number: [phone]  Additional Information:   Pt requesting a call regarding MRI concerns

## 2023-08-21 ENCOUNTER — OFFICE VISIT (OUTPATIENT)
Dept: NEUROLOGY | Facility: CLINIC | Age: 63
End: 2023-08-21
Payer: MEDICARE

## 2023-08-21 VITALS
BODY MASS INDEX: 28.41 KG/M2 | DIASTOLIC BLOOD PRESSURE: 102 MMHG | HEIGHT: 72 IN | SYSTOLIC BLOOD PRESSURE: 164 MMHG | WEIGHT: 209.75 LBS | HEART RATE: 73 BPM

## 2023-08-21 DIAGNOSIS — I10 ESSENTIAL HYPERTENSION: ICD-10-CM

## 2023-08-21 DIAGNOSIS — R29.818 TRANSIENT NEUROLOGICAL SYMPTOMS: Primary | ICD-10-CM

## 2023-08-21 DIAGNOSIS — E78.2 MIXED HYPERLIPIDEMIA: ICD-10-CM

## 2023-08-21 DIAGNOSIS — E11.49 TYPE 2 DIABETES MELLITUS WITH NEUROLOGICAL COMPLICATIONS: ICD-10-CM

## 2023-08-21 PROCEDURE — 3077F PR MOST RECENT SYSTOLIC BLOOD PRESSURE >= 140 MM HG: ICD-10-PCS | Mod: CPTII,S$GLB,, | Performed by: PSYCHIATRY & NEUROLOGY

## 2023-08-21 PROCEDURE — 3008F BODY MASS INDEX DOCD: CPT | Mod: CPTII,S$GLB,, | Performed by: PSYCHIATRY & NEUROLOGY

## 2023-08-21 PROCEDURE — 3008F PR BODY MASS INDEX (BMI) DOCUMENTED: ICD-10-PCS | Mod: CPTII,S$GLB,, | Performed by: PSYCHIATRY & NEUROLOGY

## 2023-08-21 PROCEDURE — 3044F HG A1C LEVEL LT 7.0%: CPT | Mod: CPTII,S$GLB,, | Performed by: PSYCHIATRY & NEUROLOGY

## 2023-08-21 PROCEDURE — 1159F MED LIST DOCD IN RCRD: CPT | Mod: CPTII,S$GLB,, | Performed by: PSYCHIATRY & NEUROLOGY

## 2023-08-21 PROCEDURE — 3044F PR MOST RECENT HEMOGLOBIN A1C LEVEL <7.0%: ICD-10-PCS | Mod: CPTII,S$GLB,, | Performed by: PSYCHIATRY & NEUROLOGY

## 2023-08-21 PROCEDURE — 99999 PR PBB SHADOW E&M-EST. PATIENT-LVL III: ICD-10-PCS | Mod: PBBFAC,,, | Performed by: PSYCHIATRY & NEUROLOGY

## 2023-08-21 PROCEDURE — 3080F DIAST BP >= 90 MM HG: CPT | Mod: CPTII,S$GLB,, | Performed by: PSYCHIATRY & NEUROLOGY

## 2023-08-21 PROCEDURE — 3080F PR MOST RECENT DIASTOLIC BLOOD PRESSURE >= 90 MM HG: ICD-10-PCS | Mod: CPTII,S$GLB,, | Performed by: PSYCHIATRY & NEUROLOGY

## 2023-08-21 PROCEDURE — 99215 OFFICE O/P EST HI 40 MIN: CPT | Mod: S$GLB,,, | Performed by: PSYCHIATRY & NEUROLOGY

## 2023-08-21 PROCEDURE — 1159F PR MEDICATION LIST DOCUMENTED IN MEDICAL RECORD: ICD-10-PCS | Mod: CPTII,S$GLB,, | Performed by: PSYCHIATRY & NEUROLOGY

## 2023-08-21 PROCEDURE — 99999 PR PBB SHADOW E&M-EST. PATIENT-LVL III: CPT | Mod: PBBFAC,,, | Performed by: PSYCHIATRY & NEUROLOGY

## 2023-08-21 PROCEDURE — 3077F SYST BP >= 140 MM HG: CPT | Mod: CPTII,S$GLB,, | Performed by: PSYCHIATRY & NEUROLOGY

## 2023-08-21 PROCEDURE — 99215 PR OFFICE/OUTPT VISIT, EST, LEVL V, 40-54 MIN: ICD-10-PCS | Mod: S$GLB,,, | Performed by: PSYCHIATRY & NEUROLOGY

## 2023-08-21 NOTE — PROGRESS NOTES
Vascular Neurology  Clinic Note    ___________________  ASSESSMENT & PLAN    Transient Neurological Episodes  3 Episodes w/ rapid return to baseline, atypical presentation each time, however no recurrent episodes for several weeks. Workup unremarkable. Will continue to address risk factors as TIA can not be entirely ruled out.  Continue asa 81 mg  Continue zetia, recommend adding atorvastatin 20 mg   Contineu currrent BP Rx, goal < 130/80 mmHg, would recommend adding amlodipine 5  Continue metformin, goal A1c < 7, @ goal    Visit Diagnoses:  1. Transient neurological symptoms    2. Mixed hyperlipidemia    3. Essential hypertension    4. 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  ____________________________    63 y.o. male with multiple episodes of left sided head to to split midline numbness and weakness that he needed to beat on his arm and leg to make it come back, by the time ambulance arrived most symptoms resolved. Workup @ [facility] negative. Diagnosed with TIA, was on DAPT x 20 days.Had another episode lasting a few minutes while admitted. Then several says after discharge had another episode, shook it off by beating on the arm and leg again and lasted 5 minutes. Has not had any episodes since that time.  Saw NSG who recommended MRI C spine and brain and despite valium was not able to tolerate.    Brain Imaging:  MRI 7/2023  FINDINGS:   No evidence of abnormal restricted diffusion or evidence of an acute infarction. No evidence of abnormal susceptibility artifact or of intracranial hemorrhage . There is no mass effect or midline shift. The basal cisterns remain patent. . The ventricles are normal in size and position. There is no evidence of hydrocephalous.. . There are T2/FLAIR hyperintense foci scattered throughout the  periventricular, deep, and subcortical white matter, which are not specific, but may commonly reflect sequela of chronic small vessel ischemia, mild in extent.   Vascular flow voids are unremarkable.   No abnormal calvarial signal to suggest a destructive osseous lesion. Mucosal thickening of the right sphenoid sinus.  Vessel Imaging:  CTA N&N 7/2023  FINDINGS:   Aorta is stable in caliber. No central pulmonary embolism. Common carotid, internal carotid, and external carotid arteries demonstrate no severe stenosis or acute change compared to the prior study. Both vertebral arteries are patent. Multifocal intracranial stenoses are redemonstrated without significant change. No acute large central vessel occlusion. Partially visualized lymphadenopathy is redemonstrated. Paraspinal muscles are symmetric. Bones are unchanged.  Cardiac Evaluation:  TTE TTE w bubble: negative bubble study, concentric LV hypertrophy   Other:     Relevant Labwork:  Recent Labs   Lab 07/19/23  0643 07/19/23  1013   Hemoglobin A1C  --  5.6   LDL Calculated 117  --    HDL 42  --    Triglycerides 99  --    Cholesterol 179  --         I reviewed the reports of the above imaging   I reviewed the above labwork.    Vital Signs:  Vitals - 1 value per visit 7/14/2023 7/14/2023 7/14/2023 7/24/2023 7/24/2023 8/21/2023 8/21/2023   SYSTOLIC 179 175 - - 196 - 164   DIASTOLIC 98 96 - - 144 - 102   Pulse 73 71 - - 83 - 73   Temp - - - - - - -   Resp 18 20 20 - - - -   SPO2 99 99 - - - - -   Weight (lb) - - - - 208 - 209.77   Weight (kg) - - - - 94.348 - 95.15   Height - - - - 72 - 72   BMI (Calculated) - - - - 28.2 - 28.4   VISIT REPORT - - - 17NONCRENCREPNotFromCR  [id number]; 17NONCRENCREPNotFromCR  [id number]; 17NONCRENCREPNotFromCR  [id number]; 17NONCRENCREPNotFromCR  [id number];   Pain Score  - - - 8 - 0 -   Some recent data might be hidden       Past Medical History  Past Medical History:   Diagnosis Date    Diabetes mellitus     Gout      Hepatitis C     Hypercholesteremia     Hypertension      Current Outpatient Medications   Medication Instructions    aluminum & magnesium hydroxide-simethicone (MYLANTA MAXIMUM STRENGTH) 400-400-40 mg/5 mL suspension 15 mLs, Oral, Every 6 hours PRN    amitriptyline (ELAVIL) 25 mg, Oral, Daily    aspirin (ECOTRIN) 81 mg, Oral, Daily    carvediloL (COREG) 12.5 mg, Oral, Daily    clopidogreL (PLAVIX) 75 mg, Oral    diazePAM (VALIUM) 5 MG tablet Take 1-2 tabs by mouth  30 minutes before MRI    ezetimibe (ZETIA) 10 mg, Oral, Daily    HYDROcodone-acetaminophen (NORCO)  mg per tablet 1 tablet, Oral, 3 times daily after meals PRN    latanoprost 0.005 % ophthalmic solution 1 drop    LUMIGAN 0.01 % Drop 1 drop    meloxicam (MOBIC) 15 mg, Oral, Daily    metFORMIN (GLUCOPHAGE-XR) 500 mg, Oral, 2 times daily with meals    ondansetron (ZOFRAN-ODT) 4-8 mg, Oral, Every 8 hours PRN    oxyCODONE-acetaminophen (PERCOCET)  mg per tablet 1 tablet, Oral, Every 4 hours PRN    tamsulosin (FLOMAX) 0.4 mg Cap 1 capsule, Oral, Daily    TRUE METRIX GLUCOSE TEST STRIP Strp SMARTSIG:Via Meter    TRUEPLUS LANCETS 33 gauge Misc No dose, route, or frequency recorded.    valsartan-hydrochlorothiazide (DIOVAN-HCT) 160-12.5 mg per tablet 1 tablet, Oral, Daily        Social History  Social History     Socioeconomic History    Marital status:    Tobacco Use    Smoking status: Former     Current packs/day: 0.00     Passive exposure: Never    Smokeless tobacco: Never    Tobacco comments:     quit over 30yrs   Substance and Sexual Activity    Alcohol use: No    Drug use: Yes     Types: Marijuana     Comment: daily     Social Determinants of Health     Financial Resource Strain: Low Risk  (2/10/2023)    Overall Financial Resource Strain (CARDIA)     Difficulty of Paying Living Expenses: Not hard at all   Food Insecurity: No Food Insecurity (2/10/2023)    Hunger Vital Sign     Worried About Running Out of Food in the Last Year: Never true      Ran Out of Food in the Last Year: Never true   Transportation Needs: No Transportation Needs (2/10/2023)    PRAPARE - Transportation     Lack of Transportation (Medical): No     Lack of Transportation (Non-Medical): No   Physical Activity: Inactive (2/10/2023)    Exercise Vital Sign     Days of Exercise per Week: 0 days     Minutes of Exercise per Session: 0 min   Stress: No Stress Concern Present (2/10/2023)    [employer] [place] [employer] - Occupational Stress Questionnaire     Feeling of Stress : Not at all   Social Connections: Socially Isolated (2/10/2023)    Social Connection and Isolation Panel [NHANES]     Frequency of Communication with Friends and Family: More than three times a week     Frequency of Social Gatherings with Friends and Family: Three times a week     Attends Shinto Services: Never     Active Member of Clubs or Organizations: No     Attends Club or Organization Meetings: Never     Marital Status:    Housing Stability: Unknown (2/10/2023)    Housing Stability Vital Sign     Unable to Pay for Housing in the Last Year: No     Unstable Housing in the Last Year: No              MD Katelyn  Vascular Neurology  Office [phone]

## 2023-08-21 NOTE — PATIENT INSTRUCTIONS
Continue to address risk factors with your primary are doctor.    Your LDL goal (cholesterol) is < 100. You are currently above that number and I would recommend starting atorvastatin / Lipitor 20 mg. You are already taking ezetimbe/Zetia and not at goal.    Your Blood pressure is still elevated above the goal of < 130/80 mmHg. You are currently taking Diovan and Carvedilol. Would recommend addition of amlodipine 5 mg to continue to get you to your goal.    Continue aspirin 81 mg daily.    Continue taking metformin daily.

## 2024-01-19 NOTE — TELEPHONE ENCOUNTER
Spoke to the patient, instructed to arrive 2 hours early, NPO after midnight, take only the medications your physician instructed you to take, Follow the night before surgery instructions, no smoking, gum, or mints, leave all valuables at home, plan on having someone to drive you home once you are discharged, report to the 2nd floor at main campus. Verbalizes understanding.    verbal instruction

## 2024-05-27 NOTE — PT/OT/SLP EVAL
"Physical Therapy Evaluation    Patient Name:  Ezekiel Noyola   MRN:  [id number]    Recommendations:     Discharge Recommendations:  other (see comments)(out patient PT)   Discharge Equipment Recommendations: walker, rolling   Barriers to discharge: None    Assessment:     Ezekiel Noyola is a 58 y.o. male admitted with a medical diagnosis of Spinal stenosis of lumbar region with radiculopathy.  He presents with the following impairments/functional limitations:  gait instability, pain, impaired functional mobilty, impaired self care skills, impaired skin, decreased lower extremity function, orthopedic precautions . Patient unable this am to ambulate 2/2 "severe mm spasms / cramps L lateral leg upon wt bearing. .    Rehab Prognosis: Good; patient would benefit from acute skilled PT services to address these deficits and reach maximum level of function.    Recent Surgery: Procedure(s) (LRB):  FUSION, SPINE, WITH INSTRUMENTATION Removal of spinal hardware, Bilateral Jamison-White Osteotomy L5-S1, L5-S1 Interbody Arthrodesis, L4-S1 Segmental Instrumentation with Posterior Lateral Arthrodesis (N/A) 1 Day Post-Op    Plan:     During this hospitalization, patient to be seen daily to address the identified rehab impairments via therapeutic activities, gait training, therapeutic exercises and progress toward the following goals:    · Plan of Care Expires:  01/21/19    Subjective     Chief Complaint: mm spasms  Patient/Family Comments/goals: go home  Pain/Comfort:  · Pain Rating 1: other (see comments)(did not rate pain "severe cramping ")  · Location - Side 1: Left  · Location - Orientation 1: lateral  · Location 1: leg  · Pain Addressed 1: Reposition, Distraction, Cessation of Activity    Patients cultural, spiritual, Worship conflicts given the current situation:      Living Environment:  Lives alone in handicap apt  Prior to admission, patients level of function was independent.  Equipment used at home: raised toilet.  DME " 10-Oct-2023 owned (not currently used): none.  Upon discharge, patient will have assistance from friends .    Objective:     Communicated with primary nurse prior to session.  Patient found bed alarm on hemovac  upon PT entry to room.    General Precautions: Standard, fall   Orthopedic Precautions:spinal precautions   Braces: LSO     Exams:  · RLE ROM: WFL  · RLE Strength: WFL  · LLE ROM: WFL  · LLE Strength: WFL    Functional Mobility:  · Bed Mobility:     · Supine to Sit: supervision  · Sit to Supine: supervision  · Transfers:     · Sit to Stand:  stand by assistance with rolling walker  · Gait: unable   · Balance: standing fair with AD      Therapeutic Activities and Exercises:   reviewed AROM ex ankle pumps and SKC    AM-PAC 6 CLICK MOBILITY  Total Score:21     Patient left HOB elevated with all lines intact and bed alarm on.    GOALS:   Multidisciplinary Problems     Physical Therapy Goals        Problem: Physical Therapy Goal    Goal Priority Disciplines Outcome Goal Variances Interventions   Physical Therapy Goal     PT, PT/OT Ongoing (interventions implemented as appropriate)     Description:  Goals to be met by: 2018     Patient will increase functional independence with mobility by performin. Supine to sit with Modified [place]  2. Sit to stand transfer with Modified [place]  3. Gait  x 150 feet with Modified [place] using Rolling Walker/or no AD.                       History:     Past Medical History:   Diagnosis Date    Gout     Hepatitis C     Hypertension     Positive cardiac stress test     Positive D dimer        Past Surgical History:   Procedure Laterality Date    BACK SURGERY      FRACTURE SURGERY Left     leg    FUSION, SPINE, WITH INSTRUMENTATION Removal of spinal hardware, Bilateral Jamison-White Osteotomy L5-S1, L5-S1 Interbody Arthrodesis, L4-S1 Segmental Instrumentation with Posterior Lateral Arthrodesis N/A 2018    Performed by [facility]iel [facility] at [facility] OR     HIP SURGERY Right     hip fusion    INJECTION-STEROID-EPIDURAL-LUMBAR N/A 6/20/2014    Performed by Tyson Neely Jr., [facility] at [facility] OR    KNEE ARTHROSCOPY      LAMINECTOMY      Lumbar    Myelogram N/A 11/5/2018    Performed by [facility] at [facility] OR 2ND FLR    right wrist surgery         Clinical Decision Making:     History  Co-morbidities and personal factors that may impact the plan of care Examination  Body Structures and Functions, activity limitations and participation restrictions that may impact the plan of care Clinical Presentation   Decision Making/ Complexity Score   Co-morbidities:   [] Time since onset of injury / illness / exacerbation  [] Status of current condition  []Patient's cognitive status and safety concerns    [] Multiple Medical Problems (see med hx)  Personal Factors:   [] Patient's age  [x] Prior Level of function   [] Patient's home situation (environment and family support)  [] Patient's level of motivation  [] Expected progression of patient      HISTORY:(criteria)    [] 80369 - no personal factors/history    [x] 59497 - has 1-2 personal factor/comorbidity     [] 55322 - has >3 personal factor/comorbidity     Body Regions:  [] Objective examination findings  [] Head     []  Neck  [x] Trunk   [] Upper Extremity  [x] Lower Extremity    Body Systems:  [] For communication ability, affect, cognition, language, and learning style: the assessment of the ability to make needs known, consciousness, orientation (person, place, and time), expected emotional /behavioral responses, and learning preferences (eg, learning barriers, education  needs)  [x] For the neuromuscular system: a general assessment of gross coordinated movement (eg, balance, gait, locomotion, transfers, and transitions) and motor function  (motor control and motor learning)  [x] For the musculoskeletal system: the assessment of gross symmetry, gross range of motion, gross strength, height, and weight  []  For the integumentary system: the assessment of pliability(texture), presence of scar formation, skin color, and skin integrity  [] For cardiovascular/pulmonary system: the assessment of heart rate, respiratory rate, blood pressure, and edema     Activity limitations:    [] Patient's cognitive status and saf ety concerns          [] Status of current condition      [] Weight bearing restriction  [] Cardiopulmunary Restriction    Participation Restrictions:   [] Goals and goal agreement with the patient     [] Rehab potential (prognosis) and probable outcome      Examination of Body System: (criteria)    [x] 51698 - addressing 1-2 elements    [] 88737 - addressing a total of 3 or more elements     [] 67101 -  Addressing a total of 4 or more elements         Clinical Presentation: (criteria)  Stable - 66357     On examination of body system using standardized tests and measures patient presents with 1-2 elements from any of the following: body structures and functions, activity limitations, and/or participation restrictions.  Leading to a clinical presentation that is considered evolving with changing characteristics                              Clinical Decision Making  (Eval Complexity):  Low- 93678     Time Tracking:     PT Received On: 12/21/18  PT Start Time: 0959     PT Stop Time: 1020  PT Total Time (min): 21 min     Billable Minutes: Evaluation 20      Ezequiel Pederson, PT  12/21/2018

## 2025-04-29 ENCOUNTER — HOSPITAL ENCOUNTER (OUTPATIENT)
Dept: RADIOLOGY | Facility: OTHER | Age: 65
Discharge: HOME | End: 2025-04-29
Attending: INTERNAL MEDICINE
Payer: MEDICARE

## 2025-04-29 DIAGNOSIS — Z86.0101 PERSONAL HISTORY OF ADENOMATOUS AND SERRATED COLON POLYPS: ICD-10-CM

## 2025-04-29 DIAGNOSIS — R13.10 DYSPHAGIA: ICD-10-CM

## 2025-04-29 DIAGNOSIS — K21.9 ACID REFLUX: ICD-10-CM

## 2025-04-29 DIAGNOSIS — I10 ESSENTIAL HYPERTENSION, BENIGN: ICD-10-CM

## 2025-04-29 DIAGNOSIS — K57.30 DIVERTICULOSIS OF LARGE INTESTINE WITHOUT PERFORATION OR ABSCESS WITHOUT BLEEDING: ICD-10-CM

## 2025-04-29 PROCEDURE — 25500020 PHARM REV CODE 255: Performed by: INTERNAL MEDICINE

## 2025-04-29 PROCEDURE — 74220 X-RAY XM ESOPHAGUS 1CNTRST: CPT | Mod: TC

## 2025-04-29 PROCEDURE — A9698 NON-RAD CONTRAST MATERIALNOC: HCPCS | Performed by: INTERNAL MEDICINE

## 2025-04-29 PROCEDURE — 74220 X-RAY XM ESOPHAGUS 1CNTRST: CPT | Mod: 26,,, | Performed by: RADIOLOGY

## 2025-04-29 RX ADMIN — BARIUM SULFATE 60 ML: 0.6 SUSPENSION ORAL at 09:04

## (undated) DEVICE — SEE MEDLINE ITEM 146231

## (undated) DEVICE — DRESSING AQUACEL AG FOAM 4X4

## (undated) DEVICE — SUT VICRYL 2 0 CT 2

## (undated) DEVICE — CLIPPER BLADE MOD 4406 (CAREF)

## (undated) DEVICE — ALCOHOL ISOPROPYL BLU 70% 16OZ

## (undated) DEVICE — COVER BACK TABLE 72X21

## (undated) DEVICE — DRESSING AQUACEL FOAM 5 X 5

## (undated) DEVICE — SUPPORT ULNA NERVE PROTECTOR

## (undated) DEVICE — PAD KNEE POLAR XL

## (undated) DEVICE — SEE MEDLINE ITEM 146313

## (undated) DEVICE — SEE MEDLINE ITEM 157116

## (undated) DEVICE — UNDERGLOVES BIOGEL PI SZ 7 LF

## (undated) DEVICE — SEE MEDLINE ITEM 152622

## (undated) DEVICE — APPLICATOR CHLORAPREP ORN 26ML

## (undated) DEVICE — ADHESIVE MASTISOL VIAL 48/BX

## (undated) DEVICE — SUT VICRYL+ 1 CTX 18IN VIOL

## (undated) DEVICE — PACK BASIC

## (undated) DEVICE — DRESSING MEPILEX BORDER 4 X 4

## (undated) DEVICE — NDL HYPO REG 25G X 1 1/2

## (undated) DEVICE — COVER OVERHEAD SURG LT BLUE

## (undated) DEVICE — SUT VICRYL PLUS 2-0 CT1 18

## (undated) DEVICE — KIT SPINAL PATIENT CARE JACK

## (undated) DEVICE — DRAPE STERI INSTRUMENT 1018

## (undated) DEVICE — DRESSING TELFA N ADH 3X8

## (undated) DEVICE — SUT STRATAFIX PDS 1 CTX 18IN

## (undated) DEVICE — BACITRACIN ZINC OINT 0.9GM

## (undated) DEVICE — GAUZE SPONGE 4X4 12PLY

## (undated) DEVICE — GLOVE PROTEXIS HYDROGEL SZ7

## (undated) DEVICE — PULSAVAC ZIMMER

## (undated) DEVICE — DRESSING TEGADERM 4.4X5IN

## (undated) DEVICE — ELECTRODE REM PLYHSV RETURN 9

## (undated) DEVICE — SEE MEDLINE ITEM 157148

## (undated) DEVICE — Device

## (undated) DEVICE — CONTAINER SPECIMEN STRL 4OZ

## (undated) DEVICE — KIT TOTAL HIP OPTIVAC

## (undated) DEVICE — TOURNIQUET SB QC DP 34X4IN

## (undated) DEVICE — DRESSING SURGICAL 1/2X1/2

## (undated) DEVICE — BLADE REAMER PATELLA 46MM

## (undated) DEVICE — BLADE SAG DUAL 18MMX1.27MMX90M

## (undated) DEVICE — PAD CAST SPECIALIST STRL 6

## (undated) DEVICE — CLOSURE SKIN STERI STRIP 1/4X4

## (undated) DEVICE — GOWN B1 X-LG X-LONG

## (undated) DEVICE — PAD UNDERPAD 30X30

## (undated) DEVICE — SEE MEDLINE ITEM 157117

## (undated) DEVICE — BLADE MILL BONE MEDIUM

## (undated) DEVICE — DRAPE STERI-DRAPE 1000 17X11IN

## (undated) DEVICE — PILLOW HEAD REST

## (undated) DEVICE — TOWEL OR NONABSORB ADH 17X26

## (undated) DEVICE — DRESSING TELFA STRL 4X3 LF

## (undated) DEVICE — TAPE SILK 3IN

## (undated) DEVICE — GLOVE BIOGEL SKINSENSE PI 8.5

## (undated) DEVICE — SEE MEDLINE ITEM 157150

## (undated) DEVICE — SKINMARKER W/RULER DEVON

## (undated) DEVICE — SEE MEDLINE ITEM 157131

## (undated) DEVICE — SOL 9P NACL IRR PIC IL

## (undated) DEVICE — ALCOHOL 70% ISOP W/GREEN 16OZ

## (undated) DEVICE — DRESSING LEUKOPLAST FLEX 1X3IN

## (undated) DEVICE — SYR 10CC LUER LOCK

## (undated) DEVICE — KIT DRAIN HEMOVAC 3/16IN 400ML

## (undated) DEVICE — SPONGE LAP 18X18 PREWASHED

## (undated) DEVICE — BLANKET HYPER ADULT 24X60IN

## (undated) DEVICE — DRESSING AQUACEL ADH 4X10IN

## (undated) DEVICE — DRESSING TEGADERM 2 3/8 X 2.75

## (undated) DEVICE — BLADE ELECTRO EDGE INSULATED

## (undated) DEVICE — BANDAGE ADHESIVE

## (undated) DEVICE — SPONGE GAUZE 16PLY 4X4

## (undated) DEVICE — SUT 2-0 ETHILON 18 FS

## (undated) DEVICE — NDL SPINAL SPINOCAN 22GX3.5

## (undated) DEVICE — STAPLER SKIN PROXIMATE WIDE

## (undated) DEVICE — SEE MEDLINE ITEM 152523

## (undated) DEVICE — DRESSING ADH FILM TELFA 2X3IN

## (undated) DEVICE — DRESSING TRANS 4X4 TEGADERM

## (undated) DEVICE — VEST O.R. COOLING/WARMING UNIV

## (undated) DEVICE — SEE MEDLINE ITEM 152530

## (undated) DEVICE — GLOVE 7.5 PROTEXIS PI BLUE

## (undated) DEVICE — DRAPE C-ARM/MOBILE XRAY 44X80

## (undated) DEVICE — SUT VICRYL 0 CT 2 ANTIMICRO

## (undated) DEVICE — CORD BIPOLAR 12 FOOT

## (undated) DEVICE — MARKER SKIN STND TIP BLUE BARR

## (undated) DEVICE — TRAY FOLEY 16FR INFECTION CONT

## (undated) DEVICE — NDL 21GA X1 1/2 REG BEVEL

## (undated) DEVICE — SEE MEDLINE ITEM 153151

## (undated) DEVICE — UNDERGLOVES BIOGEL PI SIZE 8

## (undated) DEVICE — BLADE RECIP DOUBLE SIDED

## (undated) DEVICE — SEE MEDLINE ITEM 156955

## (undated) DEVICE — GLOVE BIOGEL SKINSENSE PI 7.5

## (undated) DEVICE — SUT MCRYL PLUS 4-0 PS2 27IN

## (undated) DEVICE — SUT VICRYL PLUS 0 CT1 18IN

## (undated) DEVICE — PENCIL ROCKER SWITCH 10FT CORD

## (undated) DEVICE — SEE L#120831

## (undated) DEVICE — PRESSURIZER BN CEMENT NOZZLE

## (undated) DEVICE — SPONGE COTTON TRAY 4X4IN

## (undated) DEVICE — SEE MEDLINE ITEM 156905

## (undated) DEVICE — DRAPE INCISE IOBAN 2 23X23IN

## (undated) DEVICE — SEE MEDLINE ITEM 157171

## (undated) DEVICE — SOL IRR NACL .9% 3000ML

## (undated) DEVICE — SEE MEDLINE ITEM 146292

## (undated) DEVICE — KIT POWDER ABSORBABLE GELATIN

## (undated) DEVICE — SYR LUER SLIP GLASS 5ML

## (undated) DEVICE — SEALER BIPOLAR TISSUE 6.0

## (undated) DEVICE — SYR DISP LL 5CC

## (undated) DEVICE — SEE MEDLINE ITEM 152621

## (undated) DEVICE — UNDERGLOVES BIOGEL PI SIZE 8.5

## (undated) DEVICE — MASK FLYTE HOOD PEEL AWAY